# Patient Record
Sex: MALE | Race: WHITE | NOT HISPANIC OR LATINO | Employment: FULL TIME | URBAN - METROPOLITAN AREA
[De-identification: names, ages, dates, MRNs, and addresses within clinical notes are randomized per-mention and may not be internally consistent; named-entity substitution may affect disease eponyms.]

---

## 2017-01-18 RX ORDER — ATENOLOL 50 MG/1
TABLET ORAL
Qty: 30 TABLET | Refills: 0 | Status: SHIPPED | OUTPATIENT
Start: 2017-01-18 | End: 2017-02-17 | Stop reason: SDUPTHER

## 2017-01-28 ENCOUNTER — TELEPHONE (OUTPATIENT)
Dept: ELECTROPHYSIOLOGY | Facility: CLINIC | Age: 67
End: 2017-01-28

## 2017-01-28 NOTE — TELEPHONE ENCOUNTER
Left voicemail notifying patient of appt change.  New appt is 3/1/17 at 10am. Mailed out appointment reminder also

## 2017-02-06 ENCOUNTER — TELEPHONE (OUTPATIENT)
Dept: ELECTROPHYSIOLOGY | Facility: CLINIC | Age: 67
End: 2017-02-06

## 2017-02-06 NOTE — TELEPHONE ENCOUNTER
----- Message from Mariam De Jesus sent at 2/6/2017  9:11 AM CST -----  Contact: pt called  Pt called, requesting to be seen sooner and will like a contact call today.Ph [phone].Thank you

## 2017-02-17 RX ORDER — ATENOLOL 50 MG/1
TABLET ORAL
Qty: 30 TABLET | Refills: 0 | Status: SHIPPED | OUTPATIENT
Start: 2017-02-17 | End: 2017-03-22 | Stop reason: SDUPTHER

## 2017-02-17 RX ORDER — WARFARIN 2 MG/1
TABLET ORAL
Qty: 30 TABLET | Refills: 0 | Status: SHIPPED | OUTPATIENT
Start: 2017-02-17 | End: 2017-03-22 | Stop reason: SDUPTHER

## 2017-02-24 ENCOUNTER — HOSPITAL ENCOUNTER (OUTPATIENT)
Dept: CARDIOLOGY | Facility: CLINIC | Age: 67
Discharge: HOME | End: 2017-02-24
Payer: COMMERCIAL

## 2017-02-24 ENCOUNTER — ANTI-COAG VISIT (OUTPATIENT)
Dept: CARDIOLOGY | Facility: CLINIC | Age: 67
End: 2017-02-24
Payer: COMMERCIAL

## 2017-02-24 DIAGNOSIS — I48.91 ATRIAL FIBRILLATION: ICD-10-CM

## 2017-02-24 DIAGNOSIS — I48.91 ATRIAL FIBRILLATION, UNSPECIFIED TYPE: ICD-10-CM

## 2017-02-24 DIAGNOSIS — Z79.01 LONG TERM (CURRENT) USE OF ANTICOAGULANTS: Primary | ICD-10-CM

## 2017-02-24 LAB
AORTIC VALVE REGURGITATION: ABNORMAL
AORTIC VALVE STENOSIS: ABNORMAL
DIASTOLIC DYSFUNCTION: NO
ESTIMATED PA SYSTOLIC PRESSURE: 31.04
INR PPP: 2.4 (ref 2–3)
MITRAL VALVE MOBILITY: NORMAL
MITRAL VALVE REGURGITATION: ABNORMAL
RETIRED EF AND QEF - SEE NOTES: 60 (ref 55–65)
TRICUSPID VALVE REGURGITATION: ABNORMAL

## 2017-02-24 PROCEDURE — 85610 PROTHROMBIN TIME: CPT | Mod: QW,S$GLB,,

## 2017-02-24 PROCEDURE — 93306 TTE W/DOPPLER COMPLETE: CPT | Mod: S$GLB,,, | Performed by: INTERNAL MEDICINE

## 2017-02-24 PROCEDURE — 99211 OFF/OP EST MAY X REQ PHY/QHP: CPT | Mod: 25,S$GLB,,

## 2017-02-24 NOTE — PROGRESS NOTES
INR very good and stable. Pt denies changes in meds, health, or diet. He reports occasional bruising relative to activity and coumadin. Bruising assessed and nothing unusual. Maintain current dose and repeat INR 4/19 while here with other appts. Pt advised to call with changes in the meantime.

## 2017-02-27 ENCOUNTER — TELEPHONE (OUTPATIENT)
Dept: ELECTROPHYSIOLOGY | Facility: CLINIC | Age: 67
End: 2017-02-27

## 2017-02-27 NOTE — TELEPHONE ENCOUNTER
Called pt back with normal echo results, no answer. Left voicemail for pt to call back to clinic to discuss.

## 2017-03-22 DIAGNOSIS — I48.91 ATRIAL FIBRILLATION, UNSPECIFIED TYPE: Primary | ICD-10-CM

## 2017-03-22 RX ORDER — WARFARIN 2 MG/1
2 TABLET ORAL DAILY
Qty: 30 TABLET | Refills: 0 | Status: SHIPPED | OUTPATIENT
Start: 2017-03-22 | End: 2017-06-06 | Stop reason: SDUPTHER

## 2017-03-22 RX ORDER — ATENOLOL 50 MG/1
50 TABLET ORAL DAILY
Qty: 30 TABLET | Refills: 11 | Status: SHIPPED | OUTPATIENT
Start: 2017-03-22 | End: 2017-07-17 | Stop reason: SDUPTHER

## 2017-03-22 RX ORDER — DILTIAZEM HYDROCHLORIDE 240 MG/1
240 CAPSULE, COATED, EXTENDED RELEASE ORAL DAILY
Qty: 90 CAPSULE | Refills: 3 | Status: SHIPPED | OUTPATIENT
Start: 2017-03-22 | End: 2018-03-01 | Stop reason: SDUPTHER

## 2017-04-10 DIAGNOSIS — I48.91 ATRIAL FIBRILLATION, UNSPECIFIED TYPE: Primary | ICD-10-CM

## 2017-04-19 ENCOUNTER — HOSPITAL ENCOUNTER (OUTPATIENT)
Dept: CARDIOLOGY | Facility: CLINIC | Age: 67
Discharge: HOME | End: 2017-04-19
Payer: COMMERCIAL

## 2017-04-19 ENCOUNTER — OFFICE VISIT (OUTPATIENT)
Dept: ELECTROPHYSIOLOGY | Facility: CLINIC | Age: 67
End: 2017-04-19
Payer: COMMERCIAL

## 2017-04-19 ENCOUNTER — ANTI-COAG VISIT (OUTPATIENT)
Dept: CARDIOLOGY | Facility: CLINIC | Age: 67
End: 2017-04-19
Payer: COMMERCIAL

## 2017-04-19 VITALS
WEIGHT: 209.19 LBS | SYSTOLIC BLOOD PRESSURE: 108 MMHG | HEIGHT: 72 IN | BODY MASS INDEX: 28.33 KG/M2 | HEART RATE: 74 BPM | DIASTOLIC BLOOD PRESSURE: 80 MMHG

## 2017-04-19 DIAGNOSIS — I48.91 ATRIAL FIBRILLATION, UNSPECIFIED TYPE: ICD-10-CM

## 2017-04-19 DIAGNOSIS — I48.21 PERMANENT ATRIAL FIBRILLATION: Primary | ICD-10-CM

## 2017-04-19 DIAGNOSIS — I77.89 AORTIC ROOT ENLARGEMENT: ICD-10-CM

## 2017-04-19 DIAGNOSIS — Q23.1 BICUSPID AORTIC VALVE: ICD-10-CM

## 2017-04-19 DIAGNOSIS — Z79.01 LONG TERM (CURRENT) USE OF ANTICOAGULANTS: Primary | ICD-10-CM

## 2017-04-19 LAB
CTP QC/QA: YES
INR PPP: 2.8 (ref 2–3)
PROTHROMBIN TIME, POC: NORMAL (ref 2–3)

## 2017-04-19 PROCEDURE — 99213 OFFICE O/P EST LOW 20 MIN: CPT | Mod: S$GLB,,, | Performed by: INTERNAL MEDICINE

## 2017-04-19 PROCEDURE — 1160F RVW MEDS BY RX/DR IN RCRD: CPT | Mod: S$GLB,,, | Performed by: INTERNAL MEDICINE

## 2017-04-19 PROCEDURE — 99999 PR PBB SHADOW E&M-EST. PATIENT-LVL III: CPT | Mod: PBBFAC,,, | Performed by: INTERNAL MEDICINE

## 2017-04-19 PROCEDURE — 85610 PROTHROMBIN TIME: CPT | Mod: QW,S$GLB,, | Performed by: PHARMACIST

## 2017-04-19 PROCEDURE — 93000 ELECTROCARDIOGRAM COMPLETE: CPT | Mod: S$GLB,,, | Performed by: INTERNAL MEDICINE

## 2017-04-19 RX ORDER — SILDENAFIL CITRATE 100 MG/1
TABLET, FILM COATED ORAL
Refills: 7 | COMMUNITY
Start: 2017-01-11 | End: 2020-11-04

## 2017-04-19 NOTE — PROGRESS NOTES
INR is therapeutic today, higher end of range but planning to have greens today. Patient typically usually very stable one this dose. Patient denies changes to diet, medications, or health that would affect INR.  Patient will continue weekly warfarin regimen at this time. Patient advised to contact clinic with any changes, questions, or concerns.

## 2017-04-19 NOTE — MR AVS SNAPSHOT
[facility]  [address]  [facility] [address]  Phone: [phone]  Fax: [phone]                  Navjot Fine   2017 8:40 AM   Office Visit    Description:  Male : 1950   Provider:  Antonio Sosa MD   Department:  [facility]           Reason for Visit     Atrial Fibrillation           Diagnoses this Visit        Comments    Permanent atrial fibrillation    -  Primary     Bicuspid aortic valve         Aortic root enlargement                To Do List           Future Appointments        Provider Department Dept Phone    2017 3:30 PM Dennys Warren, PharmD [facility] [phone]      Goals (5 Years of Data)     None      Follow-Up and Disposition     Return in about 1 year (around 2018).    Follow-up and Disposition History      [facility]     [facility] Care [facility] -  Assistance  Unless otherwise directed by your provider, please contact [facility], our nurse care line that is available for  assistance.     Registered nurses in the [facility] Center provide: appointment scheduling, clinical advisement, health education, and other advisory services.  Call: [phone] (toll free)               Medications           Message regarding Medications     Verify the changes and/or additions to your medication regime listed below are the same as discussed with your clinician today.  If any of these changes or additions are incorrect, please notify your healthcare provider.        STOP taking these medications     STENDRA 200 mg Tab daily as needed.            Verify that the below list of medications is an accurate representation of the medications you are currently taking.  If none reported, the list may be blank. If incorrect, please contact your healthcare provider. Carry this list with you in case of emergency.           Current Medications     atenolol (TENORMIN) 50 MG tablet Take 1 tablet (50  mg total) by mouth once daily.    diltiaZEM (CARDIZEM CD) 240 MG 24 hr capsule Take 1 capsule (240 mg total) by mouth once daily.    VIAGRA 100 mg tablet TK 1 T PO PRN    warfarin (COUMADIN) 2 MG tablet Take 1 tablet (2 mg total) by mouth Daily.    warfarin (COUMADIN) 5 MG tablet Take 1 tablet (5 mg total) by mouth Daily.           Clinical Reference Information           Your Vitals Were     BP Pulse Height Weight BMI    108/80 74 6' (1.829 m) 94.9 kg (209 lb 3.5 oz) 28.37 kg/m2      Blood Pressure          Most Recent Value    BP  108/80      Allergies as of 4/19/2017     No Known Allergies      Immunizations Administered on Date of Encounter - 4/19/2017     None      Orders Placed During Today's Visit      Normal Orders This Visit    Ambulatory consult to Cardiology       [facility] Sign-Up     Activating your [facility] account is as easy as 1-2-3!     1) Visit [email or web address], select Sign Up Now, enter this activation code and your date of birth, then select Next.  [email or web address]  Expires: 6/3/2017  8:41 AM      2) Create a username and password to use when you visit [facility] in the future and select a security question in case you lose your password and select Next.    3) Enter your e-mail address and click Sign Up!    Additional Information  If you have questions, please e-mail [email or web address] or call [phone] to talk to our [facility] staff. Remember, [facility] is NOT to be used for urgent needs. For medical emergencies, dial 911.         Language Assistance Services     ATTENTION: Language assistance services are available, free of charge. Please call [phone].      ATENCIÓN: Si habla español, tiene a cardoso disposición servicios gratuitos de asistencia lingüística. Llame al [phone].     LES Ý: N?u b?n nói Ti?ng Vi?t, có các d?ch v? h? tr? ngôn ng? mi?n phí dành cho b?n. G?i s? [phone].         [facility] complies with applicable Federal civil rights laws and does not  discriminate on the basis of race, color, national origin, age, disability, or sex.

## 2017-04-19 NOTE — PROGRESS NOTES
Subjective:    Patient ID:  Navjot Fine is a 67 y.o. male who presents for follow-up of Atrial Fibrillation      HPI 66 yo male with h/o atrial fibrillation, bicuspid aortic valve, ascending aortic root enlargement.  He remains asymptomatic, happy with his energy level. Continues to exercise without problems.  Has seen Dr. Fritz in the past regarding Bicuspid aortic valve and aortic root enlargement.  Feels well.  Denies palpitations, syncope, happy with his energy level.  Exercising without problems.  Has a Fitbit.  Targets >20,000 steps a day.  Has missed target 6 times total.  Notes hair loss on legs and under arms.  Echo 2/25/17 EF 60% Aortic root 4.0 cm.    Review of Systems   Constitution: Negative. Negative for weakness and malaise/fatigue.   Cardiovascular: Negative for chest pain, dyspnea on exertion, irregular heartbeat, leg swelling, near-syncope, orthopnea, palpitations, paroxysmal nocturnal dyspnea and syncope.   Respiratory: Negative for cough and shortness of breath.    Neurological: Negative for dizziness and light-headedness.   All other systems reviewed and are negative.       Objective:    Physical Exam   Constitutional: He is oriented to person, place, and time. He appears well-developed and well-nourished.   Eyes: Conjunctivae are normal. No scleral icterus.   Neck: No JVD present. No tracheal deviation present.   Cardiovascular: Normal rate and normal heart sounds.  An irregularly irregular rhythm present. PMI is not displaced.    Pulmonary/Chest: Effort normal and breath sounds normal. No respiratory distress.   Abdominal: Soft. There is no hepatosplenomegaly. There is no tenderness.   Musculoskeletal: He exhibits no edema (lower extremity) or tenderness.   Neurological: He is alert and oriented to person, place, and time.   Skin: Skin is warm and dry. No rash noted.   Psychiatric: He has a normal mood and affect. His behavior is normal.         Assessment:       1. Permanent atrial  fibrillation    2. Bicuspid aortic valve    3. Aortic root enlargement         Plan:       Doing great.  Exercising without problems.  We have discussed DOAC's >> prefers to stay with coumadin.    Re-establish care with Cardiology regarding Bicuspid aortic valve and aortic root enlargement.

## 2017-06-06 RX ORDER — WARFARIN 2 MG/1
2 TABLET ORAL DAILY
Qty: 32 TABLET | Refills: 4
Start: 2017-06-06 | End: 2017-12-13 | Stop reason: SDUPTHER

## 2017-06-06 NOTE — ADDENDUM NOTE
Encounter addended by: Lizbeth Hines PharmD on: 6/6/2017 12:18 PM<BR>    Actions taken:  activity accessed

## 2017-07-10 ENCOUNTER — TELEPHONE (OUTPATIENT)
Dept: CARDIOLOGY | Facility: CLINIC | Age: 67
End: 2017-07-10

## 2017-07-10 DIAGNOSIS — I48.91 ATRIAL FIBRILLATION, UNSPECIFIED TYPE: Primary | ICD-10-CM

## 2017-07-10 DIAGNOSIS — I48.91 ATRIAL FIBRILLATION: ICD-10-CM

## 2017-07-11 ENCOUNTER — ANTI-COAG VISIT (OUTPATIENT)
Dept: CARDIOLOGY | Facility: CLINIC | Age: 67
End: 2017-07-11
Payer: COMMERCIAL

## 2017-07-11 ENCOUNTER — HOSPITAL ENCOUNTER (OUTPATIENT)
Dept: CARDIOLOGY | Facility: CLINIC | Age: 67
Discharge: HOME | End: 2017-07-11
Payer: COMMERCIAL

## 2017-07-11 ENCOUNTER — OFFICE VISIT (OUTPATIENT)
Dept: CARDIOLOGY | Facility: CLINIC | Age: 67
End: 2017-07-11
Payer: COMMERCIAL

## 2017-07-11 VITALS
HEART RATE: 60 BPM | BODY MASS INDEX: 27.11 KG/M2 | SYSTOLIC BLOOD PRESSURE: 115 MMHG | WEIGHT: 200.19 LBS | DIASTOLIC BLOOD PRESSURE: 70 MMHG | HEIGHT: 72 IN

## 2017-07-11 DIAGNOSIS — Z79.01 LONG TERM (CURRENT) USE OF ANTICOAGULANTS: Primary | ICD-10-CM

## 2017-07-11 DIAGNOSIS — I48.91 ATRIAL FIBRILLATION, UNSPECIFIED TYPE: ICD-10-CM

## 2017-07-11 DIAGNOSIS — I48.91 ATRIAL FIBRILLATION: ICD-10-CM

## 2017-07-11 DIAGNOSIS — I48.20 CHRONIC ATRIAL FIBRILLATION: ICD-10-CM

## 2017-07-11 DIAGNOSIS — I77.89 AORTIC ROOT ENLARGEMENT: ICD-10-CM

## 2017-07-11 LAB — INR PPP: 1.8 (ref 2–3)

## 2017-07-11 PROCEDURE — 93000 ELECTROCARDIOGRAM COMPLETE: CPT | Mod: S$GLB,,, | Performed by: INTERNAL MEDICINE

## 2017-07-11 PROCEDURE — 99214 OFFICE O/P EST MOD 30 MIN: CPT | Mod: S$GLB,,, | Performed by: INTERNAL MEDICINE

## 2017-07-11 PROCEDURE — 85610 PROTHROMBIN TIME: CPT | Mod: QW,S$GLB,, | Performed by: PHARMACIST

## 2017-07-11 PROCEDURE — 99999 PR PBB SHADOW E&M-EST. PATIENT-LVL III: CPT | Mod: PBBFAC,,, | Performed by: INTERNAL MEDICINE

## 2017-07-11 NOTE — LETTER
July 11, 2017      Antonio Sosa MD  [address] [facility] [address]           [facility]  [address]  [facility] [address]  Phone: [phone]          Patient: Navjot Fine   MR Number: [id number]   YOB: 1950   Date of Visit: 7/11/2017       Dear Dr. Antonio Sosa:    Thank you for referring Navjot Fine to me for evaluation. Attached you will find relevant portions of my assessment and plan of care.    If you have questions, please do not hesitate to call me. I look forward to following Navjot Fine along with you.    Sincerely,    Alexy Fair MD    Enclosure  CC:  Kesha Greene Jr., MD    If you would like to receive this communication electronically, please contact [email or web address] or [phone] to request more information on [facility] [employer] access.    For providers and/or their staff who would like to refer a patient to [facility], please contact us through our one-stop-shop provider referral line, [facility], at [phone].    If you feel you have received this communication in error or would no longer like to receive these types of communications, please e-mail [email or web address]

## 2017-07-11 NOTE — PROGRESS NOTES
Patient denies any changes in diet, medications, or health that would effect warfarin therapy.  INR lower than usual on previously stable dose. We will boost his warfarin dose for today then re challenge his previously stable dose

## 2017-07-11 NOTE — PROGRESS NOTES
Patient ID:  Navjot Fine is a 67 y.o. male who presents for follow-up of Atrial Fibrillation and Establish Care       Navjot Fine is a 67 y.o. male who presents for follow-up of atrial fibrillation, bicuspid aortic valve associated with aortic root dilation and to Establish Care. Patient has no symptoms, denies dizziness/near-syncope/syncope, he is physically active and takes his medicines regularly.     In the family history it is noteworthy that both the paternal grandfather and uncle  suddenly in their 40s. His father  at age 75 and did not have heart disease. His brother has atrial fibrillation and his daughter developed atrial fibrillation at age 34.    I reviewed the patient's echocardiogram to evaluate the progression of the aortic root dilatation:    2012     Aortic root 3.4 cm; ascending aorta 4.3 cm    2012      Aortic root 3.5 cm at STJ    2013       Aortic root 3.4 cm at STJ; 4.0 cm at the Sinuses of Valsalva    2015       Aortic root 3.3 cm at STJ; 3.8 cm at the Sinuses of Valsalva; 4.1 cm at the proximal ascending aorta    2017       Aortic root 4.0 cm at STJ; 4.2 cm at the Sinuses of Valsalva; 4.5 cm at the proximal ascending aorta        Lab Results   Component Value Date     2015    K 4.2 2015     2015    CO2 27 2015    BUN 20 2015    CREATININE 1.1 2015     2015    AST 23 2015    ALT 12 2015    ALBUMIN 4.0 2015    PROT 6.8 2015    BILITOT 0.8 2015    WBC 6.70 2015    HGB 13.7 (L) 2015    HCT 41.3 2015    MCV 95 2015     2015    INR 1.8 (A) 2017    INR 2.7 (H) 2010         No results found for: CHOL, HDL, TRIG    No results found for: LDLCALC    Past Medical History:   Diagnosis Date    Atrial fibrillation      Hypertension Medications             atenolol (TENORMIN) 50 MG tablet Take 1 tablet (50 mg total) by  mouth once daily.    diltiaZEM (CARDIZEM CD) 240 MG 24 hr capsule Take 1 capsule (240 mg total) by mouth once daily.            Review of Systems   Cardiovascular: Negative for chest pain, irregular heartbeat, leg swelling, near-syncope, orthopnea, palpitations and paroxysmal nocturnal dyspnea.   Respiratory: Negative for shortness of breath and sleep disturbances due to breathing.    Gastrointestinal: Negative for heartburn.        Objective:/70   Pulse 60   Ht 6' (1.829 m)   Wt 90.8 kg (200 lb 2.8 oz)   BMI 27.15 kg/m²           Physical Exam   Constitutional: He is cooperative. No distress.   Neck: Normal carotid pulses and no JVD present. Carotid bruit is not present. No thyromegaly present.   Cardiovascular: Normal rate, regular rhythm, S1 normal, S2 normal, intact distal pulses and normal pulses.    Pulmonary/Chest: Breath sounds normal.   Abdominal: He exhibits no abdominal bruit and no pulsatile midline mass.   Neurological: He is alert.   Skin:   No ankle and pretibial edema.     EKG: atrial fibrillation at a ventricular rate of 83/min, non-specific STT wave changes, unchanged since 04/19/2017.      Assessment and Plan:       1. Aortic root enlargement    2. Chronic atrial fibrillation         Aortic root enlargement  The progression of the aortic root enlargement has been relatively slow (<0.5 cm/year), however this problem needs to be followed closely. I plan to obtain a 2DEcho prior to next visit in 6 months.        Atrial fibrillation  Patient is doing well on his current medical regimen. I have advised him that if dizziness or near-syncope/syncope occur he should contact me immediately.

## 2017-07-11 NOTE — ASSESSMENT & PLAN NOTE
Patient is doing well on his current medical regimen. I have advised him that if dizziness or near-syncope/syncope occur he should contact me immediately.

## 2017-07-11 NOTE — ASSESSMENT & PLAN NOTE
The progression of the aortic root enlargement has been relatively slow (<0.5 cm/year), however this problem needs to be followed closely. I plan to obtain a 2DEcho prior to next visit in 6 months.

## 2017-07-17 DIAGNOSIS — I48.91 ATRIAL FIBRILLATION, UNSPECIFIED TYPE: ICD-10-CM

## 2017-07-17 RX ORDER — ATENOLOL 50 MG/1
50 TABLET ORAL DAILY
Qty: 30 TABLET | Refills: 11 | Status: SHIPPED | OUTPATIENT
Start: 2017-07-17 | End: 2018-06-08 | Stop reason: SDUPTHER

## 2017-07-19 ENCOUNTER — TELEPHONE (OUTPATIENT)
Dept: ELECTROPHYSIOLOGY | Facility: CLINIC | Age: 67
End: 2017-07-19

## 2017-07-19 NOTE — TELEPHONE ENCOUNTER
Prescription called into [facility] @ [address] [phone] as [facility] is out of the medication and pt is leaving to go out of town. Pt verbalized understanding.    ----- Message from Mariam De Jesus sent at 7/19/2017  2:43 PM CDT -----  Contact: Pt called  Pt called, states [facility] does not have RX atenolol and he is currently leaving for a trip this Friday. He states he will like to know what medication should he use as a replacement. He states he is completely out of medication starting today.Ph [phone]. Thank you

## 2017-08-29 ENCOUNTER — ANTI-COAG VISIT (OUTPATIENT)
Dept: CARDIOLOGY | Facility: CLINIC | Age: 67
End: 2017-08-29
Payer: COMMERCIAL

## 2017-08-29 DIAGNOSIS — Z79.01 LONG TERM (CURRENT) USE OF ANTICOAGULANTS: Primary | ICD-10-CM

## 2017-08-29 LAB — INR PPP: 2.1 (ref 2–3)

## 2017-08-29 PROCEDURE — 99211 OFF/OP EST MAY X REQ PHY/QHP: CPT | Mod: 25,S$GLB,, | Performed by: PHARMACIST

## 2017-08-29 PROCEDURE — 85610 PROTHROMBIN TIME: CPT | Mod: QW,S$GLB,, | Performed by: PHARMACIST

## 2017-09-25 ENCOUNTER — TELEPHONE (OUTPATIENT)
Dept: ELECTROPHYSIOLOGY | Facility: CLINIC | Age: 67
End: 2017-09-25

## 2017-09-25 NOTE — TELEPHONE ENCOUNTER
----- Message from Magalys Santillan sent at 9/25/2017  9:44 AM CDT -----  Contact: pt  Pt needs to ask you a quick question about his  diltiaZEM (CARDIZEM CD) 240 MG 24 hr capsule rx and can be reached at [phone].     Thank you

## 2017-10-10 NOTE — PROGRESS NOTES
The pt is scheduled for a colonoscopy at [facility] on 1/22/2018.  He is approved to hold coumadin x 3 days prior and I am not recommending lovenox, due to CHADs = 0.  I have sent this recommendation to Dr. Sosa.  Clearance e-mailed to [email or web address].

## 2017-12-04 ENCOUNTER — ANTI-COAG VISIT (OUTPATIENT)
Dept: CARDIOLOGY | Facility: CLINIC | Age: 67
End: 2017-12-04
Payer: COMMERCIAL

## 2017-12-04 DIAGNOSIS — Z79.01 LONG-TERM (CURRENT) USE OF ANTICOAGULANTS: Primary | ICD-10-CM

## 2017-12-04 LAB — INR PPP: 2.8 (ref 2–3)

## 2017-12-04 PROCEDURE — 99211 OFF/OP EST MAY X REQ PHY/QHP: CPT | Mod: 25,S$GLB,,

## 2017-12-04 PROCEDURE — 85610 PROTHROMBIN TIME: CPT | Mod: QW,S$GLB,,

## 2017-12-04 NOTE — PROGRESS NOTES
INR within normal range today. Patient states that he went to the ER 10/29/17 for an injury incurred while working out. Patient states that he has bruising but no other problems. Patient denies bleeding or any other changes. Will maintain current dose and follow up in 6 weeks. Advised patient to notify us of any change sor concerns.

## 2017-12-04 NOTE — PROGRESS NOTES
Pt seen by Carolyn HINES. I have reviewed her initial findings and agree with her assessment.  Care plan made together. He will come in 1 week prior to upcoming colonoscopy for detailed instructions based on INR at that time.

## 2017-12-13 DIAGNOSIS — Z79.01 LONG TERM CURRENT USE OF ANTICOAGULANT THERAPY: ICD-10-CM

## 2017-12-13 DIAGNOSIS — I48.91 ATRIAL FIBRILLATION, UNSPECIFIED TYPE: ICD-10-CM

## 2017-12-13 RX ORDER — WARFARIN 2 MG/1
2 TABLET ORAL DAILY
Qty: 32 TABLET | Refills: 4
Start: 2017-12-13 | End: 2018-03-16 | Stop reason: SDUPTHER

## 2017-12-22 DIAGNOSIS — I48.91 ATRIAL FIBRILLATION, UNSPECIFIED TYPE: ICD-10-CM

## 2017-12-22 DIAGNOSIS — Z79.01 LONG TERM CURRENT USE OF ANTICOAGULANT THERAPY: ICD-10-CM

## 2017-12-22 RX ORDER — WARFARIN SODIUM 5 MG/1
5 TABLET ORAL DAILY
Qty: 90 TABLET | Refills: 3 | Status: SHIPPED | OUTPATIENT
Start: 2017-12-22 | End: 2018-03-05 | Stop reason: SDUPTHER

## 2018-01-17 NOTE — PROGRESS NOTES
Patient called to report that he missed his coumadin lab appt 1/15, wants to reschedule it for week after the Colonoscopy--states it's being done on Wednesday 1/24 -not 1/22 and states Dr. Sosa is having him hold the coumadin x 3 days prior--last dose will be Saturday, call back # [phone]

## 2018-01-17 NOTE — PROGRESS NOTES
I STRONGLY URGE HE HAVE INR TOMORROW, PROTOCOL IS TO GET INR WEEK PRIOR TP PROCEDURE TO ASSURE APPROPRIATE DAYS OF HOLDING

## 2018-02-23 ENCOUNTER — ANTI-COAG VISIT (OUTPATIENT)
Dept: CARDIOLOGY | Facility: CLINIC | Age: 68
End: 2018-02-23
Payer: COMMERCIAL

## 2018-02-23 DIAGNOSIS — Z79.01 LONG TERM (CURRENT) USE OF ANTICOAGULANTS: Primary | ICD-10-CM

## 2018-02-23 LAB — INR PPP: 2 (ref 2–3)

## 2018-02-23 PROCEDURE — 99211 OFF/OP EST MAY X REQ PHY/QHP: CPT | Mod: 25,S$GLB,,

## 2018-02-23 PROCEDURE — 85610 PROTHROMBIN TIME: CPT | Mod: QW,S$GLB,,

## 2018-02-23 NOTE — PROGRESS NOTES
INR within normal range today. Patient with bruises from use. He states that he just got back from a cruise and he had an increase in greens and he won't continue this change. Denies any bleeding or other changes. Patient is stable on this dose. He will continue this dose until follow-up in 7 weeks. I advised him to contact us with any changes or problems.

## 2018-03-01 DIAGNOSIS — I48.91 ATRIAL FIBRILLATION, UNSPECIFIED TYPE: ICD-10-CM

## 2018-03-01 RX ORDER — DILTIAZEM HYDROCHLORIDE 240 MG/1
240 CAPSULE, COATED, EXTENDED RELEASE ORAL DAILY
Qty: 90 CAPSULE | Refills: 3 | Status: SHIPPED | OUTPATIENT
Start: 2018-03-01 | End: 2018-07-13 | Stop reason: SDUPTHER

## 2018-03-05 DIAGNOSIS — Z79.01 LONG TERM CURRENT USE OF ANTICOAGULANT THERAPY: ICD-10-CM

## 2018-03-05 DIAGNOSIS — I48.91 ATRIAL FIBRILLATION, UNSPECIFIED TYPE: ICD-10-CM

## 2018-03-05 RX ORDER — WARFARIN SODIUM 5 MG/1
5 TABLET ORAL DAILY
Qty: 90 TABLET | Refills: 3 | Status: SHIPPED | OUTPATIENT
Start: 2018-03-05 | End: 2018-03-14 | Stop reason: SDUPTHER

## 2018-03-14 DIAGNOSIS — I48.91 ATRIAL FIBRILLATION, UNSPECIFIED TYPE: ICD-10-CM

## 2018-03-14 DIAGNOSIS — Z79.01 LONG TERM CURRENT USE OF ANTICOAGULANT THERAPY: ICD-10-CM

## 2018-03-14 RX ORDER — WARFARIN SODIUM 5 MG/1
5 TABLET ORAL DAILY
Qty: 90 TABLET | Refills: 3 | Status: SHIPPED | OUTPATIENT
Start: 2018-03-14 | End: 2019-10-03 | Stop reason: SDUPTHER

## 2018-03-16 DIAGNOSIS — Z79.01 LONG TERM CURRENT USE OF ANTICOAGULANT THERAPY: ICD-10-CM

## 2018-03-16 DIAGNOSIS — I48.91 ATRIAL FIBRILLATION, UNSPECIFIED TYPE: ICD-10-CM

## 2018-03-19 RX ORDER — WARFARIN 2 MG/1
2 TABLET ORAL DAILY
Qty: 32 TABLET | Refills: 4
Start: 2018-03-19 | End: 2019-10-03 | Stop reason: SDUPTHER

## 2018-04-25 ENCOUNTER — ANTI-COAG VISIT (OUTPATIENT)
Dept: CARDIOLOGY | Facility: CLINIC | Age: 68
End: 2018-04-25
Payer: COMMERCIAL

## 2018-04-25 DIAGNOSIS — Z79.01 LONG TERM (CURRENT) USE OF ANTICOAGULANTS: Primary | ICD-10-CM

## 2018-04-25 LAB — INR PPP: 2.5 (ref 2–3)

## 2018-04-25 PROCEDURE — 99211 OFF/OP EST MAY X REQ PHY/QHP: CPT | Mod: 25,S$GLB,, | Performed by: PHARMACIST

## 2018-04-25 PROCEDURE — 85610 PROTHROMBIN TIME: CPT | Mod: QW,S$GLB,, | Performed by: INTERNAL MEDICINE

## 2018-04-25 NOTE — PROGRESS NOTES
Pt seen by Adamaris HINES. I have reviewed her documentation and agree with her assessment and plan.

## 2018-04-25 NOTE — PROGRESS NOTES
INR within normal range today. Patient with bruise on arm from use. Denies any bleeding or changes. Patient is stable on this dose. He will continue this dose until follow-up in 7 weeks. I advised him to contact us with any changes or problems.

## 2018-06-08 DIAGNOSIS — I48.91 ATRIAL FIBRILLATION, UNSPECIFIED TYPE: ICD-10-CM

## 2018-06-08 RX ORDER — ATENOLOL 50 MG/1
50 TABLET ORAL DAILY
Qty: 30 TABLET | Refills: 11 | Status: SHIPPED | OUTPATIENT
Start: 2018-06-08 | End: 2018-06-13 | Stop reason: SDUPTHER

## 2018-06-12 NOTE — PROGRESS NOTES
Patient called to reschedule 6/13 coumadin clinic appointment due to being out of town, rescheduled it to 6/19

## 2018-06-13 DIAGNOSIS — I48.91 ATRIAL FIBRILLATION, UNSPECIFIED TYPE: ICD-10-CM

## 2018-06-13 RX ORDER — ATENOLOL 50 MG/1
50 TABLET ORAL DAILY
Qty: 30 TABLET | Refills: 11 | Status: SHIPPED | OUTPATIENT
Start: 2018-06-13 | End: 2019-06-03 | Stop reason: SDUPTHER

## 2018-06-18 DIAGNOSIS — I48.91 ATRIAL FIBRILLATION, UNSPECIFIED TYPE: Primary | ICD-10-CM

## 2018-06-21 ENCOUNTER — ANTI-COAG VISIT (OUTPATIENT)
Dept: CARDIOLOGY | Facility: CLINIC | Age: 68
End: 2018-06-21
Payer: COMMERCIAL

## 2018-06-21 DIAGNOSIS — R00.2 PALPITATIONS: Primary | ICD-10-CM

## 2018-06-21 DIAGNOSIS — Z79.01 LONG TERM (CURRENT) USE OF ANTICOAGULANTS: Primary | ICD-10-CM

## 2018-06-21 LAB — INR PPP: 3 (ref 2–3)

## 2018-06-21 PROCEDURE — 85610 PROTHROMBIN TIME: CPT | Mod: QW,S$GLB,, | Performed by: INTERNAL MEDICINE

## 2018-06-21 NOTE — PROGRESS NOTES
INR within therapeutic range today. Patient states that he may have had less vitamin K foods. Patient denies any bleeding, bruising or other changes that would affect warfarin therapy. Will maintain current dose. Patient advised to call coumadin clinic with any changes or concerns.

## 2018-06-22 DIAGNOSIS — I77.89 AORTIC ROOT ENLARGEMENT: Primary | ICD-10-CM

## 2018-07-13 ENCOUNTER — HOSPITAL ENCOUNTER (OUTPATIENT)
Dept: CARDIOLOGY | Facility: CLINIC | Age: 68
Discharge: HOME | End: 2018-07-13
Payer: COMMERCIAL

## 2018-07-13 ENCOUNTER — HOSPITAL ENCOUNTER (OUTPATIENT)
Dept: CARDIOLOGY | Facility: CLINIC | Age: 68
Discharge: HOME | End: 2018-07-13
Attending: INTERNAL MEDICINE
Payer: COMMERCIAL

## 2018-07-13 ENCOUNTER — OFFICE VISIT (OUTPATIENT)
Dept: CARDIOLOGY | Facility: CLINIC | Age: 68
End: 2018-07-13
Payer: COMMERCIAL

## 2018-07-13 VITALS
HEART RATE: 97 BPM | SYSTOLIC BLOOD PRESSURE: 116 MMHG | BODY MASS INDEX: 27 KG/M2 | WEIGHT: 199.31 LBS | HEIGHT: 72 IN | DIASTOLIC BLOOD PRESSURE: 69 MMHG

## 2018-07-13 DIAGNOSIS — R00.2 PALPITATIONS: ICD-10-CM

## 2018-07-13 DIAGNOSIS — Q23.1 BICUSPID AORTIC VALVE: Primary | ICD-10-CM

## 2018-07-13 DIAGNOSIS — I77.89 AORTIC ROOT ENLARGEMENT: ICD-10-CM

## 2018-07-13 DIAGNOSIS — I35.9 NONRHEUMATIC AORTIC VALVE DISORDER: ICD-10-CM

## 2018-07-13 DIAGNOSIS — I48.19 PERSISTENT ATRIAL FIBRILLATION: ICD-10-CM

## 2018-07-13 DIAGNOSIS — I77.9 AORTIC DISEASE: ICD-10-CM

## 2018-07-13 DIAGNOSIS — I48.91 ATRIAL FIBRILLATION, UNSPECIFIED TYPE: ICD-10-CM

## 2018-07-13 LAB
AORTIC VALVE REGURGITATION: NORMAL
AORTIC VALVE STENOSIS: NORMAL
DIASTOLIC DYSFUNCTION: NO
ESTIMATED PA SYSTOLIC PRESSURE: 32.31
MITRAL VALVE MOBILITY: NORMAL
MITRAL VALVE REGURGITATION: NORMAL
RETIRED EF AND QEF - SEE NOTES: 60 (ref 55–65)
TRICUSPID VALVE REGURGITATION: NORMAL

## 2018-07-13 PROCEDURE — 99999 PR PBB SHADOW E&M-EST. PATIENT-LVL IV: CPT | Mod: PBBFAC,,, | Performed by: INTERNAL MEDICINE

## 2018-07-13 PROCEDURE — 99214 OFFICE O/P EST MOD 30 MIN: CPT | Mod: S$GLB,,, | Performed by: INTERNAL MEDICINE

## 2018-07-13 PROCEDURE — 93306 TTE W/DOPPLER COMPLETE: CPT | Mod: S$GLB,,, | Performed by: INTERNAL MEDICINE

## 2018-07-13 PROCEDURE — 93000 ELECTROCARDIOGRAM COMPLETE: CPT | Mod: S$GLB,,, | Performed by: INTERNAL MEDICINE

## 2018-07-13 RX ORDER — DILTIAZEM HYDROCHLORIDE 240 MG/1
CAPSULE, COATED, EXTENDED RELEASE ORAL
Qty: 90 CAPSULE | Refills: 3 | Status: SHIPPED | OUTPATIENT
Start: 2018-07-13 | End: 2019-03-13 | Stop reason: SDUPTHER

## 2018-07-13 NOTE — ASSESSMENT & PLAN NOTE
Today's echocardiogram shows no progression of the aortic root dilation since February 2017:  02/25/2017       Aortic root 4.0 cm at STJ; 4.2 cm at the Sinuses of Valsalva; 4.5 cm at the proximal ascending aorta  07/13/2018        The aortic root is mildly enlarged, measuring 3.4 cm at sinotubular junction and 4.2 cm at Sinuses of Valsalva. The proximal ascending aorta is moderately enlarged, measuring 4.5 cm across.     I discussed with the patient the possibility that in the future he may need to undergo surgery for ascending aorta aneurysm. He agreed to have a preliminary meeting with [facility].    CTA of the chest   [facility] ambulatory referral

## 2018-07-13 NOTE — ASSESSMENT & PLAN NOTE
Asymptomatic. Today's ECG shows atrial fibrillation with a ventricular rate of 91/min.    Increase cardizem CD to 360 mg qd

## 2018-07-13 NOTE — ASSESSMENT & PLAN NOTE
Today's echocardiogram shows: the aortic valve is moderately sclerotic with moderately restricted leaflet mobility. The aortic valve is bi-leaflet in structure. The peak velocity obtained across the aortic valve is 1.8 m/s. The mean gradient is 8 mmHg. The calculated aortic valve area is 2.24 cm2.  There is trivial aortic regurgitation.     Pt advised that his first degree relatives should be evaluated for bicuspid aortic valve.

## 2018-07-13 NOTE — PROGRESS NOTES
"Cardiology Progress Note:    Patient ID:  Navjot Fine is a 68 y.o. male who presents for follow-up of Atrial Fibrillation, bicuspid aortic valve and dilated aortic root.    History of Present Illness: Pt has been in his usual state of health since I last saw him on 2017.     He has persistent atrial fibrillation but is asymptomatic and has no apparent limitations of his exercise tolerance because of this problem. He exercises regularly without problems    I reviewed the patient's echocardiogram to evaluate the progression of the aortic root dilatation:     2012     Aortic root 3.4 cm; ascending aorta 4.3 cm     2012      Aortic root 3.5 cm at STJ     2013       Aortic root 3.4 cm at STJ; 4.0 cm at the Sinuses of Valsalva     2015       Aortic root 3.3 cm at STJ; 3.8 cm at the Sinuses of Valsalva; 4.1 cm at the proximal ascending aorta     2017       Aortic root 4.0 cm at STJ; 4.2 cm at the Sinuses of Valsalva; 4.5 cm at the proximal ascending aorta    2018        The aortic root is mildly enlarged, measuring 3.4 cm at sinotubular junction and 4.2 cm at Sinuses of Valsalva. The proximal ascending aorta is moderately enlarged, measuring 4.5 cm across.     Relevant information from my 2017 note:  "Navjot Fine is a 67 y.o. male who presents for follow-up of atrial fibrillation, bicuspid aortic valve associated with aortic root dilation and to Establish Care. Patient has no symptoms, denies dizziness/near-syncope/syncope, he is physically active and takes his medicines regularly."    Past Medical History Includes:  Atrial fibrillation; bicuspid aortic valve; aortic root enlargement    Family History Includes:    In the family history it is noteworthy that both the paternal grandfather and uncle  suddenly in their 40s. His father  at age 75 and did not have heart disease. His brother has atrial fibrillation and his daughter developed atrial fibrillation at " age 34.    Full Medication List Includes:  Outpatient Encounter Prescriptions as of 7/13/2018   Medication Sig Dispense Refill    atenolol (TENORMIN) 50 MG tablet Take 1 tablet (50 mg total) by mouth once daily. 30 tablet 11    diltiaZEM (CARDIZEM CD) 240 MG 24 hr capsule Take 1 and 1/2 tablet (360 mg) once per day 90 capsule 3    VIAGRA 100 mg tablet TK 1 T PO PRN  7    warfarin (COUMADIN) 2 MG tablet Take 1 tablet (2 mg total) by mouth Daily. As directed by [facility] 32 tablet 4    warfarin (COUMADIN) 5 MG tablet Take 1 tablet (5 mg total) by mouth Daily. 90 tablet 3    [DISCONTINUED] diltiaZEM (CARDIZEM CD) 240 MG 24 hr capsule Take 1 capsule (240 mg total) by mouth once daily. 90 capsule 3     No facility-administered encounter medications on file as of 7/13/2018.        Review of Systems:  Review of Systems   Constitution: Negative for decreased appetite, weakness, malaise/fatigue, weight gain and weight loss.   HENT: Negative for ear discharge, hearing loss and nosebleeds.    Eyes: Negative for blurred vision, pain, photophobia and visual disturbance.   Cardiovascular: Negative for chest pain, claudication, cyanosis, dyspnea on exertion, irregular heartbeat, leg swelling, near-syncope, orthopnea, palpitations, paroxysmal nocturnal dyspnea and syncope.   Respiratory: Negative for cough, hemoptysis, shortness of breath, sleep disturbances due to breathing, snoring, sputum production and wheezing.    Endocrine: Negative for cold intolerance and polydipsia.   Hematologic/Lymphatic: Negative for adenopathy and bleeding problem. Does not bruise/bleed easily.   Skin: Negative for poor wound healing, rash and suspicious lesions.   Musculoskeletal: Negative for arthritis, back pain, falls, gout, joint pain, muscle cramps, myalgias and neck pain.   Gastrointestinal: Negative for anorexia, change in bowel habit, constipation, diarrhea, excessive appetite, heartburn, hematemesis, hematochezia, melena, nausea  and vomiting.   Genitourinary: Negative for decreased libido, dysuria, flank pain and frequency.   Neurological: Negative for difficulty with concentration, excessive daytime sleepiness, dizziness, focal weakness, light-headedness, loss of balance, seizures and vertigo.   Psychiatric/Behavioral: Negative for altered mental status, memory loss and substance abuse. The patient is not nervous/anxious.    Allergic/Immunologic: Negative for HIV exposure and hives.       Physical Exam:  /69 (BP Location: Left arm, Patient Position: Sitting, BP Method: Medium (Automatic))   Pulse 97   Ht 6' (1.829 m)   Wt 90.4 kg (199 lb 4.7 oz)   BMI 27.03 kg/m²   Physical Exam   Constitutional: He is oriented to person, place, and time. He appears well-developed and well-nourished.   HENT:   Head: Normocephalic.   Right Ear: External ear normal.   Left Ear: External ear normal.   Nose: Nose normal.   Inspection of lips, teeth and gums normal   Eyes: EOM are normal. Pupils are equal, round, and reactive to light. No scleral icterus.   Neck: Normal range of motion. Neck supple. No JVD present. No tracheal deviation present. No thyromegaly present.   Cardiovascular: Normal rate, S1 normal, S2 normal and intact distal pulses.  An irregularly irregular rhythm present. Exam reveals no gallop and no friction rub.    Murmur heard.  High-pitched blowing holosystolic murmur is present with a grade of 1/6  at the apex  Pulses:       Carotid pulses are 2+ on the right side, and 2+ on the left side.       Radial pulses are 2+ on the right side, and 2+ on the left side.        Dorsalis pedis pulses are 2+ on the right side, and 2+ on the left side.        Posterior tibial pulses are 2+ on the right side, and 2+ on the left side.   Pulmonary/Chest: Effort normal and breath sounds normal.   Abdominal: Bowel sounds are normal. He exhibits no distension. There is no hepatosplenomegaly. There is no tenderness. There is no guarding.    Musculoskeletal: Normal range of motion. He exhibits no edema or tenderness.   Lymphadenopathy:   Palpation of neck and groin lymph nodes normal   Neurological: He is alert and oriented to person, place, and time. No cranial nerve deficit. He exhibits normal muscle tone. Coordination normal.   Skin: Skin is dry.   No ankle nor pretibial edema   Psychiatric: His behavior is normal. Judgment and thought content normal.        Blood Tests:  Lab Results   Component Value Date     07/31/2015    K 4.2 07/31/2015     07/31/2015    CO2 27 07/31/2015    BUN 20 07/31/2015    CREATININE 1.1 07/31/2015     07/31/2015    AST 23 07/31/2015    ALT 12 07/31/2015    ALBUMIN 4.0 07/31/2015    PROT 6.8 07/31/2015    BILITOT 0.8 07/31/2015    WBC 6.70 07/31/2015    HGB 13.7 (L) 07/31/2015    HCT 41.3 07/31/2015    MCV 95 07/31/2015     07/31/2015    INR 3.0 06/21/2018    INR 2.7 (H) 03/29/2010       No results found for: CHOL, HDL, TRIG    No results found for: LDLCALC    Urine Tests:  No results found for: COLORU, APPEARANCEUA, PHUR, SPECGRAV, PROTEINUA, GLUCUA, KETONESU, BILIRUBINUA, OCCULTUA, NITRITE, UROBILINOGEN, LEUKOCYTESUR, PROTEINURINE, CREATRANDUR, UTPCR       ECG (11-JUL-2017)  Atrial fibrillation  Low QRS voltage in limb leads  Nonspecific ST and T wave abnormality  Abnormal ECG  When compared with ECG of 19-APR-2017 07:55,  No significant change was found          Echocardiogram (07/13/2018)    TEST DESCRIPTION   Technical Quality: This is a technically adequate study.     Aorta: The aortic root is mildly enlarged, measuring 3.4 cm at sinotubular junction and 4.2 cm at Sinuses of Valsalva. The proximal ascending aorta is moderately enlarged, measuring 4.5 cm across.     Left Atrium: The left atrial volume index is severely enlarged, measuring 71.71 cc/m2.     Left Ventricle: The left ventricle is normal in size, with an end-diastolic diameter of 5.0 cm, and an end-systolic diameter of 3.2 cm. LV  wall thickness is normal, with the septum measuring 1.0 cm and the posterior wall measuring 0.9 cm across. Relative wall thickness was normal at 0.36, and the LV mass index was 90.4 g/m2 consistent with normal left ventricular mass. There are no regional wall motion abnormalities. Left ventricular systolic function appears normal. Visually estimated ejection fraction is 60-65%. The LV Doppler derived stroke volume equals 72.0 ccs.     Diastolic indices: E/e' ratio(avg) 7. Diastolic function is normal.     Right Atrium: The right atrium is severely enlarged, measuring 7.9 cm in length and 4.9 cm in width in the apical view.     Right Ventricle: The right ventricle is normal in size. Global right ventricular systolic function appears normal. Tricuspid annular plane systolic excursion (TAPSE) is 2.8 cm. Tissue Doppler-derived tricuspid annular peak systolic velocity (S prime) is 10.3 cm/s. The estimated PA systolic pressure is 32 mmHg.     Aortic Valve:  The aortic valve is moderately sclerotic with moderately restricted leaflet mobility. The aortic valve is bi-leaflet in structure. The peak velocity obtained across the aortic valve is 1.8 m/s, which translates to a peak gradient of 13 mmHg. The mean gradient is 8 mmHg. Using a left ventricular outflow tract diameter of 2.8 cm, a left ventricular outflow tract velocity time integral of 12 cm, and a peak instantaneous transvalvular velocity time integral of 32 cm, the calculated aortic valve area is 2.24 cm2 (AVAi is 1.02 cm2/m2), consistent with trivial aortic stenosis. Additionally, there is trivial aortic regurgitation.     Mitral Valve:  The mitral valve is mildly sclerotic with normal leaflet mobility. There is mild mitral regurgitation.     Tricuspid Valve:  There is mild tricuspid regurgitation.     Pulmonary Valve:  There is trivial pulmonic regurgitation.     IVC: IVC is enlarged and collapses < 50% with a sniff, suggesting high right atrial pressure of 15  mmHg.     Intracavitary: There is no evidence of pericardial effusion, intracavity mass, thrombi, or vegetation.     CONCLUSIONS     1 - Normal left ventricular systolic function (EF 60-65%).     2 - Normal right ventricular systolic function .     3 - Biatrial enlargement.     4 - Moderately enlarged ascending aorta (45mm in diameter).     5 - Bi-leaflet aortic valve with aortic sclerosis.     6 - Mild mitral regurgitation.     7 - Mild tricuspid regurgitation.     8 - The estimated PA systolic pressure is 32 mmHg.     9 - Increased central venous pressure.     This document has been electronically    SIGNED BY: Don Tsang MD         Echocardiogram (02/24/2017)    TEST DESCRIPTION   Technical Quality: This is a technically adequate study.     General: The patient was in an irregularly irregular rhythm throughout the study.     Aorta: The aortic root is enlarged, measuring 4.0 cm at sinotubular junction and 4.2 cm at Sinuses of Valsalva. The proximal ascending aorta is moderately enlarged, measuring 4.5 cm across.     Left Atrium: The left atrial volume index is severely enlarged, measuring 57.53 cc/m2.     Left Ventricle: The left ventricle is normal in size, with an end-diastolic diameter of 4.7 cm, and an end-systolic diameter of 3.4 cm. LV wall thickness is normal, with the septum measuring 1.0 cm and the posterior wall measuring 0.8 cm across. Relative wall thickness was normal at 0.34, and the LV mass index was 74.9 g/m2 consistent with normal left ventricular mass. Global left ventricular systolic function appears normal. Visually estimated ejection fraction is 60-65%. The LV Doppler derived stroke   volume equals 75.0 ccs.   No distinct A waves were identified on assessment of mitral inflow. The E/e'(lat) is 7, consistent with normal diastolic function.     Right Atrium: The right atrium is normal in size, measuring 6.9 cm in length and 4.3 cm in width in the apical view.     Right Ventricle: The right  ventricle is normal in size measuring 3.9 cm at the base in the apical right ventricle-focused view. Global right ventricular systolic function appears normal. Tricuspid annular plane systolic excursion (TAPSE) is 2.3 cm.   Tissue Doppler-derived tricuspid annular peak systolic velocity (S prime) is 10.2 cm/s. The estimated PA systolic pressure is 31 mmHg.     Aortic Valve:  The aortic valve is moderately sclerotic with mildly restricted leaflet mobility. The aortic valve is bi-leaflet in structure. There is mild aortic stenosis. Additionally, there is trivial aortic regurgitation. Partial fusion of left and right coronary cusps.     Mitral Valve:  The mitral valve is normal in structure with normal leaflet mobility. There is mild to moderate mitral regurgitation.     Tricuspid Valve:  The tricuspid valve is normal in structure with normal leaflet mobility. There is mild tricuspid regurgitation.     Pulmonary Valve:  The pulmonic valve is normal in structure with normal leaflet mobility. There is trivial pulmonic regurgitation.     IVC: IVC is enlarged but collapses > 50% with a sniff, suggesting intermediate right atrial pressure of 8 mmHg.     Intracavitary: There is no evidence of pericardial effusion, intracavity mass, thrombi, or vegetation.     CONCLUSIONS     1 - Normal left ventricular systolic function (EF 60-65%).     2 - Normal right ventricular systolic function .     3 - Severe left atrial enlargement.     4 - Moderately enlarged ascending aorta.     5 - Bi-leaflet aortic valve.     6 - Mild aortic stenosis.     7 - Mild to moderate mitral regurgitation.     8 - Mild tricuspid regurgitation.     9 - The estimated PA systolic pressure is 31 mmHg.     This document has been electronically    SIGNED BY: Don Tsang MD         I have reviewed the following:     Details / Date    []   Labs     []   Imaging     []   Cardiology Procedures     []   Other      Assessment and Plan:     1. Bicuspid aortic valve     2. Aortic root enlargement    3. Persistent atrial fibrillation    4. Aortic disease    5. Atrial fibrillation, unspecified type         Bicuspid aortic valve  Today's echocardiogram shows: the aortic valve is moderately sclerotic with moderately restricted leaflet mobility. The aortic valve is bi-leaflet in structure. The peak velocity obtained across the aortic valve is 1.8 m/s. The mean gradient is 8 mmHg. The calculated aortic valve area is 2.24 cm2.  There is trivial aortic regurgitation.     Pt advised that his first degree relatives should be evaluated for bicuspid aortic valve.    Aortic root enlargement  Today's echocardiogram shows no progression of the aortic root dilation since February 2017:  02/25/2017       Aortic root 4.0 cm at STJ; 4.2 cm at the Sinuses of Valsalva; 4.5 cm at the proximal ascending aorta  07/13/2018        The aortic root is mildly enlarged, measuring 3.4 cm at sinotubular junction and 4.2 cm at Sinuses of Valsalva. The proximal ascending aorta is moderately enlarged, measuring 4.5 cm across.     I discussed with the patient the possibility that in the future he may need to undergo surgery for ascending aorta aneurysm. He agreed to have a preliminary meeting with [facility].    CTA of the chest   CTS ambulatory referral    Atrial fibrillation  Asymptomatic. Today's ECG shows atrial fibrillation with a ventricular rate of 91/min.    Increase cardizem CD to 360 mg qd      Follow-up in about 6 months (around 1/13/2019) for Dr Corral.      Alexy Fair MD

## 2018-07-16 ENCOUNTER — TELEPHONE (OUTPATIENT)
Dept: ELECTROPHYSIOLOGY | Facility: CLINIC | Age: 68
End: 2018-07-16

## 2018-07-16 NOTE — TELEPHONE ENCOUNTER
Returned patient call. Appointment scheduled for Ekg and a  follow-up appointment.  Please keep scheduled appointments.

## 2018-07-16 NOTE — PROGRESS NOTES
Subjective:    Patient ID:  Navjot Fine is a 68 y.o. male who presents for follow-up of Atrial Fibrillation      HPI 67 yo male with h/o atrial fibrillation, bicuspid aortic valve, ascending aortic root enlargement.  He remains asymptomatic, happy with his energy level. Continues to exercise without problems.    Continues to feel well.  Remains very active (>63754 steps per day).    Notes hair loss on legs and under arms.  Echo 7/13/18 EF 60-65% Ascending aorta 4.5 cm.  Remains on coumadin.        Review of Systems   Constitution: Negative. Negative for weakness and malaise/fatigue.   Cardiovascular: Negative for chest pain, dyspnea on exertion, irregular heartbeat, leg swelling, near-syncope, orthopnea, palpitations, paroxysmal nocturnal dyspnea and syncope.   Respiratory: Negative for cough and shortness of breath.    Neurological: Negative for dizziness and light-headedness.   All other systems reviewed and are negative.       Objective:    Physical Exam   Constitutional: He is oriented to person, place, and time. He appears well-developed and well-nourished.   Eyes: Conjunctivae are normal. No scleral icterus.   Neck: No JVD present. No tracheal deviation present.   Cardiovascular: Normal rate and normal heart sounds.  An irregularly irregular rhythm present. PMI is not displaced.    Pulmonary/Chest: Effort normal and breath sounds normal. No respiratory distress.   Abdominal: Soft. There is no hepatosplenomegaly. There is no tenderness.   Musculoskeletal: He exhibits no edema (lower extremity) or tenderness.   Neurological: He is alert and oriented to person, place, and time.   Skin: Skin is warm and dry. No rash noted.   Psychiatric: He has a normal mood and affect. His behavior is normal.         Assessment:       1. Chronic atrial fibrillation    2. Aortic root enlargement    3. Bicuspid aortic valve         Plan:           Chronic atrial fibrillation.  Remains rate controlled and asymptomatic, with  normal EF.  He is set up to see Dr. Tadeo regarding his Aortic root.  If surgery is considered, I would endorse DINORA ligation, but defer Zuniga-Maze (has longstanding af and is asymptomatic).  F/u with me in one year.

## 2018-07-16 NOTE — TELEPHONE ENCOUNTER
----- Message from Kayli Short sent at 7/16/2018 10:33 AM CDT -----  Contact: pt called   Pt had a EKG on 7/13/18 and was wondering if he still has to completed another one on 7/18/18. Please call pt@ [phone]. Thank you.

## 2018-07-18 ENCOUNTER — OFFICE VISIT (OUTPATIENT)
Dept: ELECTROPHYSIOLOGY | Facility: CLINIC | Age: 68
End: 2018-07-18
Payer: COMMERCIAL

## 2018-07-18 ENCOUNTER — HOSPITAL ENCOUNTER (OUTPATIENT)
Dept: CARDIOLOGY | Facility: CLINIC | Age: 68
Discharge: HOME | End: 2018-07-18
Payer: COMMERCIAL

## 2018-07-18 VITALS
HEIGHT: 72 IN | WEIGHT: 197.56 LBS | BODY MASS INDEX: 26.76 KG/M2 | DIASTOLIC BLOOD PRESSURE: 86 MMHG | SYSTOLIC BLOOD PRESSURE: 128 MMHG | HEART RATE: 85 BPM

## 2018-07-18 DIAGNOSIS — I48.20 CHRONIC ATRIAL FIBRILLATION: Primary | ICD-10-CM

## 2018-07-18 DIAGNOSIS — Q23.1 BICUSPID AORTIC VALVE: ICD-10-CM

## 2018-07-18 DIAGNOSIS — I77.89 AORTIC ROOT ENLARGEMENT: ICD-10-CM

## 2018-07-18 DIAGNOSIS — I48.91 ATRIAL FIBRILLATION, UNSPECIFIED TYPE: ICD-10-CM

## 2018-07-18 PROCEDURE — 93000 ELECTROCARDIOGRAM COMPLETE: CPT | Mod: S$GLB,,, | Performed by: INTERNAL MEDICINE

## 2018-07-18 PROCEDURE — 99214 OFFICE O/P EST MOD 30 MIN: CPT | Mod: S$GLB,,, | Performed by: INTERNAL MEDICINE

## 2018-07-18 PROCEDURE — 99999 PR PBB SHADOW E&M-EST. PATIENT-LVL III: CPT | Mod: PBBFAC,,, | Performed by: INTERNAL MEDICINE

## 2018-07-20 ENCOUNTER — HOSPITAL ENCOUNTER (OUTPATIENT)
Dept: RADIOLOGY | Facility: HOSPITAL | Age: 68
Discharge: HOME | End: 2018-07-20
Attending: INTERNAL MEDICINE
Payer: COMMERCIAL

## 2018-07-20 DIAGNOSIS — I77.9 AORTIC DISEASE: ICD-10-CM

## 2018-07-20 LAB
CREAT SERPL-MCNC: 0.8 MG/DL (ref 0.5–1.4)
SAMPLE: NORMAL

## 2018-07-20 PROCEDURE — 25500020 PHARM REV CODE 255: Performed by: INTERNAL MEDICINE

## 2018-07-20 PROCEDURE — 71275 CT ANGIOGRAPHY CHEST: CPT | Mod: TC

## 2018-07-20 PROCEDURE — 71275 CT ANGIOGRAPHY CHEST: CPT | Mod: 26,,, | Performed by: RADIOLOGY

## 2018-07-20 RX ADMIN — IOHEXOL 100 ML: 350 INJECTION, SOLUTION INTRAVENOUS at 04:07

## 2018-07-25 ENCOUNTER — TELEPHONE (OUTPATIENT)
Dept: CARDIOLOGY | Facility: CLINIC | Age: 68
End: 2018-07-25

## 2018-07-25 NOTE — TELEPHONE ENCOUNTER
Called patient to give him results of recent CT of the chest but no answer. Left detailed message on his voicemail and asked him to call us.    MD Patricia Marshall MA             Please inform pt that the CT of the chest shows that the dilation of the thoracic aorta is stable since May 2012.     shelter

## 2018-08-02 ENCOUNTER — OFFICE VISIT (OUTPATIENT)
Dept: CARDIOTHORACIC SURGERY | Facility: CLINIC | Age: 68
End: 2018-08-02
Payer: COMMERCIAL

## 2018-08-02 VITALS
HEART RATE: 86 BPM | WEIGHT: 199.44 LBS | DIASTOLIC BLOOD PRESSURE: 76 MMHG | OXYGEN SATURATION: 98 % | TEMPERATURE: 98 F | HEIGHT: 72 IN | SYSTOLIC BLOOD PRESSURE: 112 MMHG | BODY MASS INDEX: 27.01 KG/M2

## 2018-08-02 DIAGNOSIS — I77.89 AORTIC ROOT ENLARGEMENT: ICD-10-CM

## 2018-08-02 DIAGNOSIS — Q23.1 BICUSPID AORTIC VALVE: Primary | ICD-10-CM

## 2018-08-02 PROCEDURE — 99205 OFFICE O/P NEW HI 60 MIN: CPT | Mod: S$GLB,,, | Performed by: THORACIC SURGERY (CARDIOTHORACIC VASCULAR SURGERY)

## 2018-08-02 PROCEDURE — 99999 PR PBB SHADOW E&M-EST. PATIENT-LVL III: CPT | Mod: PBBFAC,,, | Performed by: THORACIC SURGERY (CARDIOTHORACIC VASCULAR SURGERY)

## 2018-08-02 RX ORDER — VITAMIN B COMPLEX
1 CAPSULE ORAL DAILY
COMMUNITY

## 2018-08-02 NOTE — PROGRESS NOTES
History & Physical    SUBJECTIVE:     History of Present Illness:  Patient is a 68 y.o. male presents with an enlarged aortic root and a bicuspid aortic valve.  He has atrial fib and see's Dr. Sosa.  He is on coumadin.  He is active and continues to exercise.  He denies shortness of breath, PND, orthopnea, increased abdominal girth, or pedal edema.  He is here to discussed his enlarged root and his bicuspid AV.    Chief Complaint   Patient presents with    Consult       Review of patient's allergies indicates:   Allergen Reactions    No known allergies        Current Outpatient Prescriptions   Medication Sig Dispense Refill    atenolol (TENORMIN) 50 MG tablet Take 1 tablet (50 mg total) by mouth once daily. 30 tablet 11    diltiaZEM (CARDIZEM CD) 240 MG 24 hr capsule Take 1 and 1/2 tablet (360 mg) once per day 90 capsule 3    VIAGRA 100 mg tablet TK 1 T PO PRN  7    warfarin (COUMADIN) 2 MG tablet Take 1 tablet (2 mg total) by mouth Daily. As directed by [facility] 32 tablet 4    warfarin (COUMADIN) 5 MG tablet Take 1 tablet (5 mg total) by mouth Daily. 90 tablet 3     No current facility-administered medications for this visit.        Past Medical History:   Diagnosis Date    Atrial fibrillation      History reviewed. No pertinent surgical history.  History reviewed. No pertinent family history.  Social History   Substance Use Topics    Smoking status: Never Smoker    Smokeless tobacco: Never Used    Alcohol use Yes      Comment: [facility].        Review of Systems     Review of Systems   Constitutional: Negative for fever and malaise/fatigue.   HENT: Negative for congestion and sore throat.    Eyes: Negative for blurred vision, double vision and discharge.   Respiratory: Negative for cough, shortness of breath and wheezing.    Cardiovascular: Negative for chest pain, palpitations, claudication, leg swelling and PND.   Gastrointestinal: Negative for abdominal pain, constipation, diarrhea, nausea and  vomiting.   Genitourinary: Negative for dysuria, frequency and urgency.   Musculoskeletal: Negative for back pain and myalgias.   Skin: Negative for itching and rash.   Neurological: Negative for dizziness, speech change, seizures, weakness and headaches.   Endo/Heme/Allergies: Does not bruise/bleed easily.   Psychiatric/Behavioral: Negative for depression. The patient is not nervous/anxious.        OBJECTIVE:     Vital Signs (Most Recent)    Reviewed nurses notes      Physical Exam     Physical Exam   Constitutional: Patient appears well-developed and well-nourished.   HENT:   Head: Normocephalic and atraumatic.   Eyes: Pupils are equal, round, and reactive to light.   Neck: Normal range of motion. Neck supple.   Cardiovascular: Normal rate, regular rhythm and normal heart sounds.    Pulmonary/Chest: Effort normal and breath sounds normal.   Abdominal: Soft. Bowel sounds are normal.   Musculoskeletal: Normal range of motion.   Neurological: Alert and oriented to person, place, and time.   Skin: Skin is warm and dry.   Psychiatric: Normal mood and affect. Behavior is normal.       Laboratory  reviewed    Diagnostic Results:  2D echo:  1 - Normal left ventricular systolic function (EF 60-65%).     2 - Normal right ventricular systolic function .     3 - Biatrial enlargement.     4 - Moderately enlarged ascending aorta (45mm in diameter).     5 - Bi-leaflet aortic valve with aortic sclerosis.     6 - Mild mitral regurgitation.     7 - Mild tricuspid regurgitation.     8 - The estimated PA systolic pressure is 32 mmHg.     9 - Increased central venous pressure.     CT scan of the chest:  There is a left-sided aortic arch with 3 branch vessels demonstrating minimal calcific atherosclerosis. There is aortic valve calcification in this patient with history of bi-leaflet valve.  The sinus of Valsalva measures 4.1 cm.  The mid ascending aorta measures 4.3 cm (coronal series 602, image 46), previously 4.6 cm on CT chest  dated 05/03/2012.  The mid descending aorta measures 3.2 cm, unchanged.    ASSESSMENT/PLAN:     68 year old male with enlarged aortic root (appears unchanged from last year) and a bicuspid AV without valvular dysfunction.  He presents for surgical evaluation.    PLAN:    CTS Attending Note:    I have personally taken the history and examined this patient and agree with the NP's note as stated above. Very pleasant 68-year-old gentleman with known bicuspid aortic valve and a dilated ascending aorta.  His ascending aorta is roughly 4.3 cm at the level of the right pulmonary artery.  The sinuses are roughly the same.  His degree of stenosis is minimal.  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  We discussed indications for surgery including a 5 cm ascending aorta in the absence of stenosis, and a 4.5 cm ascending aorta in the presence of a bicuspid stenotic valve.  We discussed aortic stenosis and the slow, but unfortunately relentless progression.  I will plan to see him back in 1 year with a repeat echo and CT scan.

## 2018-08-02 NOTE — LETTER
August 2, 2018      Alexy Fair MD  [address]  [facility] [address]           [facility] - Cardiovascular Surg  [address]  [facility] [address]  Phone: [phone]          Patient: Navjot Fine   MR Number: [id number]   YOB: 1950   Date of Visit: 8/2/2018       Dear Dr. Alexy Fair:    Thank you for referring Navjot Fine to me for evaluation. Attached you will find relevant portions of my assessment and plan of care.    If you have questions, please do not hesitate to call me. I look forward to following Navjot Fine along with you.    Sincerely,    Immanuel Tadeo MD    Enclosure  CC:  No Recipients    If you would like to receive this communication electronically, please contact [email or web address] or [phone] to request more information on [facility] access.    For providers and/or their staff who would like to refer a patient to [facility], please contact us through our one-stop-shop provider referral line, [facility], [place], at [phone].    If you feel you have received this communication in error or would no longer like to receive these types of communications, please e-mail [email or web address]

## 2018-08-23 ENCOUNTER — ANTI-COAG VISIT (OUTPATIENT)
Dept: CARDIOLOGY | Facility: CLINIC | Age: 68
End: 2018-08-23
Payer: COMMERCIAL

## 2018-08-23 DIAGNOSIS — Z79.01 LONG TERM (CURRENT) USE OF ANTICOAGULANTS: Primary | ICD-10-CM

## 2018-08-23 LAB — INR PPP: 2.8 (ref 2–3)

## 2018-08-23 PROCEDURE — 99211 OFF/OP EST MAY X REQ PHY/QHP: CPT | Mod: 25,S$GLB,, | Performed by: INTERNAL MEDICINE

## 2018-08-23 PROCEDURE — 85610 PROTHROMBIN TIME: CPT | Mod: QW,S$GLB,, | Performed by: INTERNAL MEDICINE

## 2018-08-23 NOTE — PROGRESS NOTES
INR within range today.  Will maintain and follow up in 7 weeks.  Bruising from use but denies bleeding or other issues.  Patient will call with concerns or questions.

## 2018-10-15 ENCOUNTER — TELEPHONE (OUTPATIENT)
Dept: INTERNAL MEDICINE | Facility: CLINIC | Age: 68
End: 2018-10-15

## 2018-10-15 NOTE — TELEPHONE ENCOUNTER
Called patient to schedule his office visit no answer left message to call back to schedule his Est Care appointment.

## 2018-10-16 ENCOUNTER — TELEPHONE (OUTPATIENT)
Dept: INTERNAL MEDICINE | Facility: CLINIC | Age: 68
End: 2018-10-16

## 2018-10-16 NOTE — TELEPHONE ENCOUNTER
Wrong patient did not mean to call him yesterday. Called and  And could not leave a message to disregard the call.

## 2018-11-02 ENCOUNTER — TELEPHONE (OUTPATIENT)
Dept: INTERNAL MEDICINE | Facility: CLINIC | Age: 68
End: 2018-11-02

## 2018-11-02 NOTE — TELEPHONE ENCOUNTER
Called patient no answer left message to let him know he is scheduled to est care fro 11/13 at 2:30pm.

## 2018-11-02 NOTE — TELEPHONE ENCOUNTER
Please book this pt in the 2:30 slot on 11/13. EPP or whatever 30 minute appointment goes there. Let me know when complete. It is a new pt establish care.

## 2018-11-13 ENCOUNTER — OFFICE VISIT (OUTPATIENT)
Dept: INTERNAL MEDICINE | Facility: CLINIC | Age: 68
End: 2018-11-13
Payer: COMMERCIAL

## 2018-11-13 ENCOUNTER — ANTI-COAG VISIT (OUTPATIENT)
Dept: CARDIOLOGY | Facility: CLINIC | Age: 68
End: 2018-11-13
Payer: COMMERCIAL

## 2018-11-13 VITALS
HEART RATE: 95 BPM | DIASTOLIC BLOOD PRESSURE: 72 MMHG | SYSTOLIC BLOOD PRESSURE: 122 MMHG | WEIGHT: 198.19 LBS | OXYGEN SATURATION: 98 % | BODY MASS INDEX: 26.84 KG/M2 | HEIGHT: 72 IN

## 2018-11-13 DIAGNOSIS — I77.89 AORTIC ROOT ENLARGEMENT: ICD-10-CM

## 2018-11-13 DIAGNOSIS — Z11.59 NEED FOR HEPATITIS C SCREENING TEST: ICD-10-CM

## 2018-11-13 DIAGNOSIS — M25.511 CHRONIC RIGHT SHOULDER PAIN: ICD-10-CM

## 2018-11-13 DIAGNOSIS — K80.20 CALCULUS OF GALLBLADDER WITHOUT CHOLECYSTITIS WITHOUT OBSTRUCTION: ICD-10-CM

## 2018-11-13 DIAGNOSIS — Z79.01 LONG TERM (CURRENT) USE OF ANTICOAGULANTS: Primary | ICD-10-CM

## 2018-11-13 DIAGNOSIS — Z00.00 ROUTINE PHYSICAL EXAMINATION: Primary | ICD-10-CM

## 2018-11-13 DIAGNOSIS — Q23.1 BICUSPID AORTIC VALVE: ICD-10-CM

## 2018-11-13 DIAGNOSIS — I48.91 ATRIAL FIBRILLATION, UNSPECIFIED TYPE: ICD-10-CM

## 2018-11-13 DIAGNOSIS — G89.29 CHRONIC RIGHT SHOULDER PAIN: ICD-10-CM

## 2018-11-13 DIAGNOSIS — Z12.5 SCREENING FOR PROSTATE CANCER: ICD-10-CM

## 2018-11-13 LAB — INR PPP: 2.7 (ref 2–3)

## 2018-11-13 PROCEDURE — 99999 PR PBB SHADOW E&M-EST. PATIENT-LVL III: CPT | Mod: PBBFAC,,, | Performed by: INTERNAL MEDICINE

## 2018-11-13 PROCEDURE — 99397 PER PM REEVAL EST PAT 65+ YR: CPT | Mod: S$GLB,,, | Performed by: INTERNAL MEDICINE

## 2018-11-13 PROCEDURE — 85610 PROTHROMBIN TIME: CPT | Mod: QW,S$GLB,, | Performed by: INTERNAL MEDICINE

## 2018-11-13 RX ORDER — ZOLPIDEM TARTRATE 10 MG/1
10 TABLET ORAL NIGHTLY PRN
Qty: 10 TABLET | Refills: 1 | Status: SHIPPED | OUTPATIENT
Start: 2018-11-13 | End: 2019-11-14 | Stop reason: SDUPTHER

## 2018-11-13 NOTE — PROGRESS NOTES
Patient INR in therapeutic range today. Reports no bleeding, bruising or other changes. Will maintain current weekly dose until follow up in 8 weeks. Advised patient to call with any concerns or changes.

## 2018-11-13 NOTE — PROGRESS NOTES
Subjective:       Patient ID: Navjot Fine is a 68 y.o. male.    Chief Complaint: Establish Care    HPI:  New patient to me here to establish care.  He sees Cardiology for bicuspid aortic valve and atrial fibrillatio and dilated aortic root.  His prior doctor associated with [facility] has retired.  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  He has a list of questions to discuss.  He occasionally has right shoulder pain and was told he might need surgery but he wishes to wait until he retires.  He symptomatically treated and is able to exercise so he will continue same.  He was told on evaluation in the past that he had gallstones but is not interested in surgery.  He had an acute abdominal pain in the past that may have been a gallbladder attack but none since and is not interested in having surgery at this time.  He was certainly notify us for any new or recurrent attacks.  The he says his wife complains that he has increased gas and belching.  I suspect this relates to food intake as he has good bowel movements, denies indigestion or pain. No constipation.  He is up-to-date with colon screening.  He is also seen an outside urologist for erectile dysfunction  He denies any symptoms related to AFib or his vascular system.  Cardiology notes reviewed      Review of Systems   Constitutional: Negative for chills, fatigue, fever and unexpected weight change.   HENT: Negative for ear pain, nosebleeds, sore throat and trouble swallowing.    Eyes: Negative for pain and visual disturbance.   Respiratory: Negative for cough, shortness of breath and wheezing.    Cardiovascular: Negative for chest pain, palpitations and leg swelling.   Gastrointestinal: Negative for abdominal pain, constipation, diarrhea, nausea and vomiting.        Occasional intestinal gas or belching   Genitourinary: Negative for difficulty urinating, frequency and hematuria.   Musculoskeletal: Positive for arthralgias  (Right shoulder). Negative for back pain, myalgias, neck pain and neck stiffness.   Skin: Negative for rash and wound.   Neurological: Negative for dizziness, numbness and headaches.   Hematological: Negative for adenopathy. Does not bruise/bleed easily.   Psychiatric/Behavioral: Positive for sleep disturbance ( insomnia with transAtlantic flights). Negative for dysphoric mood and suicidal ideas. The patient is not nervous/anxious.        Objective:      Physical Exam   Constitutional: He is oriented to person, place, and time. He appears well-developed and well-nourished. No distress.   HENT:   Head: Normocephalic and atraumatic.   Right Ear: External ear normal.   Left Ear: External ear normal.   Nose: Nose normal. No rhinorrhea.   Mouth/Throat: Oropharynx is clear and moist and mucous membranes are normal. No oropharyngeal exudate.   Eyes: Conjunctivae and EOM are normal. Pupils are equal, round, and reactive to light.   Neck: Normal range of motion. Neck supple. No JVD present. No thyromegaly present.   No supraclavicular nodes palpated bilaterally.    Cardiovascular: Normal rate, regular rhythm, normal heart sounds and intact distal pulses.   No murmur heard.  Pulmonary/Chest: Effort normal and breath sounds normal. He has no wheezes. He has no rales.   Abdominal: Soft. Bowel sounds are normal. He exhibits no distension and no mass. There is no tenderness.   Musculoskeletal: Normal range of motion. He exhibits no edema or tenderness.   Lymphadenopathy:     He has no cervical adenopathy.        Right: No supraclavicular adenopathy present.        Left: No supraclavicular adenopathy present.   Neurological: He is alert and oriented to person, place, and time. He has normal reflexes. No cranial nerve deficit.   Reflex Scores:       Bicep reflexes are 2+ on the right side and 2+ on the left side.       Patellar reflexes are 2+ on the right side and 2+ on the left side.  Skin: Skin is warm and dry. No rash noted.    Psychiatric: He has a normal mood and affect. His behavior is normal. He does not exhibit a depressed mood.       Assessment:       1. Routine physical examination    2. Atrial fibrillation, unspecified type    3. Bicuspid aortic valve    4. Aortic root enlargement    5. Chronic right shoulder pain    6. Calculus of gallbladder without cholecystitis without obstruction    7. Need for hepatitis C screening test    8. Screening for prostate cancer        Plan:       Navjot was seen today for establish care.    Diagnoses and all orders for this visit:    Routine physical examination  -     PSA, Screening; Future  -     Lipid panel; Future  -     CBC auto differential; Future  -     Comprehensive metabolic panel; Future  -     Hemoglobin A1c; Future  -     Hepatitis C antibody; Future    Atrial fibrillation, unspecified type    Bicuspid aortic valve    Aortic root enlargement    Chronic right shoulder pain    Calculus of gallbladder without cholecystitis without obstruction    Need for hepatitis C screening test  -     Hepatitis C antibody; Future    Screening for prostate cancer  -     PSA, Screening; Future    Other orders  -     zolpidem (AMBIEN) 10 mg Tab; Take 1 tablet (10 mg total) by mouth nightly as needed.

## 2018-11-13 NOTE — LETTER
[facility] - Coumadin  [address]  [facility] [address]  Phone: [phone]  Fax: [phone]     Dear Mr. Morfin,   Our records indicate an appointment was missed to have a PT/INR checked.  We have made multiple attempts to contact you by phone and were unsuccessful.  It is extremely important that our clinic is contacted immediately in order to reschedule the missed appointment, as it is important that you are monitored regularly while taking Coumadin (warfarin).  If monitoring by our clinic is no longer required, please call and inform our clinic so that we may update our records and discontinue further attempts to reach you.      Please call the Coumadin Clinic immediately upon receiving this letter, failure to do so may result in discharge from the clinic.  No response to this letter within 7 days will result in discharge from our clinic.      Contact information for the Coumadin Clinic:    Phone: [phone]    Fax: [phone]      Thank You,    [facility] Staff

## 2018-11-15 ENCOUNTER — TELEPHONE (OUTPATIENT)
Dept: INTERNAL MEDICINE | Facility: CLINIC | Age: 68
End: 2018-11-15

## 2018-11-15 ENCOUNTER — LAB VISIT (OUTPATIENT)
Dept: LAB | Facility: HOSPITAL | Age: 68
End: 2018-11-15
Attending: INTERNAL MEDICINE
Payer: COMMERCIAL

## 2018-11-15 DIAGNOSIS — Z11.59 NEED FOR HEPATITIS C SCREENING TEST: ICD-10-CM

## 2018-11-15 DIAGNOSIS — Z00.00 ROUTINE PHYSICAL EXAMINATION: ICD-10-CM

## 2018-11-15 DIAGNOSIS — Z12.5 SCREENING FOR PROSTATE CANCER: ICD-10-CM

## 2018-11-15 LAB
ALBUMIN SERPL BCP-MCNC: 4.3 G/DL
ALP SERPL-CCNC: 66 U/L
ALT SERPL W/O P-5'-P-CCNC: 23 U/L
ANION GAP SERPL CALC-SCNC: 8 MMOL/L
AST SERPL-CCNC: 39 U/L
BASOPHILS # BLD AUTO: 0.04 K/UL
BASOPHILS NFR BLD: 0.8 %
BILIRUB SERPL-MCNC: 0.9 MG/DL
BUN SERPL-MCNC: 28 MG/DL
CALCIUM SERPL-MCNC: 9.7 MG/DL
CHLORIDE SERPL-SCNC: 105 MMOL/L
CHOLEST SERPL-MCNC: 189 MG/DL
CHOLEST/HDLC SERPL: 3.5 {RATIO}
CO2 SERPL-SCNC: 27 MMOL/L
COMPLEXED PSA SERPL-MCNC: 2.6 NG/ML
CREAT SERPL-MCNC: 0.9 MG/DL
DIFFERENTIAL METHOD: ABNORMAL
EOSINOPHIL # BLD AUTO: 0.1 K/UL
EOSINOPHIL NFR BLD: 1.5 %
ERYTHROCYTE [DISTWIDTH] IN BLOOD BY AUTOMATED COUNT: 11.4 %
EST. GFR  (AFRICAN AMERICAN): >60 ML/MIN/1.73 M^2
EST. GFR  (NON AFRICAN AMERICAN): >60 ML/MIN/1.73 M^2
ESTIMATED AVG GLUCOSE: 88 MG/DL
GLUCOSE SERPL-MCNC: 91 MG/DL
HBA1C MFR BLD HPLC: 4.7 %
HCT VFR BLD AUTO: 43 %
HCV AB SERPL QL IA: NEGATIVE
HDLC SERPL-MCNC: 54 MG/DL
HDLC SERPL: 28.6 %
HGB BLD-MCNC: 13.9 G/DL
IMM GRANULOCYTES # BLD AUTO: 0.03 K/UL
IMM GRANULOCYTES NFR BLD AUTO: 0.6 %
LDLC SERPL CALC-MCNC: 119.6 MG/DL
LYMPHOCYTES # BLD AUTO: 1.4 K/UL
LYMPHOCYTES NFR BLD: 29.3 %
MCH RBC QN AUTO: 32 PG
MCHC RBC AUTO-ENTMCNC: 32.3 G/DL
MCV RBC AUTO: 99 FL
MONOCYTES # BLD AUTO: 0.5 K/UL
MONOCYTES NFR BLD: 11.1 %
NEUTROPHILS # BLD AUTO: 2.7 K/UL
NEUTROPHILS NFR BLD: 56.7 %
NONHDLC SERPL-MCNC: 135 MG/DL
NRBC BLD-RTO: 0 /100 WBC
PLATELET # BLD AUTO: 199 K/UL
PMV BLD AUTO: 12.1 FL
POTASSIUM SERPL-SCNC: 4.1 MMOL/L
PROT SERPL-MCNC: 6.9 G/DL
RBC # BLD AUTO: 4.34 M/UL
SODIUM SERPL-SCNC: 140 MMOL/L
TRIGL SERPL-MCNC: 77 MG/DL
WBC # BLD AUTO: 4.78 K/UL

## 2018-11-15 PROCEDURE — 86803 HEPATITIS C AB TEST: CPT

## 2018-11-15 PROCEDURE — 85025 COMPLETE CBC W/AUTO DIFF WBC: CPT

## 2018-11-15 PROCEDURE — 84153 ASSAY OF PSA TOTAL: CPT

## 2018-11-15 PROCEDURE — 36415 COLL VENOUS BLD VENIPUNCTURE: CPT

## 2018-11-15 PROCEDURE — 80061 LIPID PANEL: CPT

## 2018-11-15 PROCEDURE — 80053 COMPREHEN METABOLIC PANEL: CPT

## 2018-11-15 PROCEDURE — 83036 HEMOGLOBIN GLYCOSYLATED A1C: CPT

## 2018-11-16 NOTE — TELEPHONE ENCOUNTER
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 If he would like a copy we can certainly send him a copy.

## 2019-01-08 NOTE — PROGRESS NOTES
Patient called 01/08/19 left mgs on Vm for nando appt 01/08/19 to 01/15/19. Patient caught up in meeting today.

## 2019-01-14 RX ORDER — TADALAFIL 20 MG/1
20 TABLET ORAL DAILY PRN
Qty: 6 TABLET | Refills: 5 | Status: SHIPPED | OUTPATIENT
Start: 2019-01-14 | End: 2020-11-04

## 2019-02-08 NOTE — PROGRESS NOTES
Called patient to schedule a follow up appointment. The patient wanted an appt that was after 4. Advised the patient that the only late appointment time was 2:45pm. He is scheduled for 2/19/19. Advised the patient that I was going to drop an appt slip in the mail. He stated that he type it in the computer.

## 2019-02-28 NOTE — PROGRESS NOTES
Called both numbers on the patient phone to get him scheduled for his cancelled appt. The patient did not answer or the emergency contact did not answer. Pt cancelled 3 appts and no showed to the other.

## 2019-03-04 NOTE — PROGRESS NOTES
We have attempted to contact this patient multiple times. I will send a letter of intent to discharge to him and Dr. Sosa. If there is no response in 7 days he will be discharged from the coumadin clinic

## 2019-03-13 DIAGNOSIS — I48.91 ATRIAL FIBRILLATION, UNSPECIFIED TYPE: ICD-10-CM

## 2019-03-13 RX ORDER — DILTIAZEM HYDROCHLORIDE 240 MG/1
240 CAPSULE, COATED, EXTENDED RELEASE ORAL DAILY
Qty: 90 CAPSULE | Refills: 3 | Status: SHIPPED | OUTPATIENT
Start: 2019-03-13 | End: 2019-03-18 | Stop reason: SDUPTHER

## 2019-03-14 ENCOUNTER — TELEPHONE (OUTPATIENT)
Dept: CARDIOTHORACIC SURGERY | Facility: CLINIC | Age: 69
End: 2019-03-14

## 2019-03-14 DIAGNOSIS — I77.89 AORTIC ROOT ENLARGEMENT: Primary | ICD-10-CM

## 2019-03-14 NOTE — TELEPHONE ENCOUNTER
Spoke to patient, scheduled patient for CT non con (per Dr. Tadeo) on 8-20, however was unable to schedule Echo or appt w Dr. Tadeo since the date is further out than 3 months. Patient will call back in April to schedule rest.----- Message from Lachelle Maguire sent at 3/14/2019  9:30 AM CDT -----  Reason for call: Pt calling to schedule a 1 year follow up after August 15th.         Communication Preference:[phone]    Additional Information:

## 2019-03-18 DIAGNOSIS — I48.91 ATRIAL FIBRILLATION, UNSPECIFIED TYPE: ICD-10-CM

## 2019-03-19 RX ORDER — DILTIAZEM HYDROCHLORIDE 240 MG/1
240 CAPSULE, COATED, EXTENDED RELEASE ORAL DAILY
Qty: 90 CAPSULE | Refills: 3 | Status: SHIPPED | OUTPATIENT
Start: 2019-03-19 | End: 2019-10-10 | Stop reason: SDUPTHER

## 2019-03-20 NOTE — PROGRESS NOTES
Called the patient to get him rescheduled for his missed appointment on 2/19/19. Unable to leave message on patient's voicemail due to mailbox being full. Will send out letter.

## 2019-03-25 NOTE — PROGRESS NOTES
Patient called to reschedule 2/19/19 missed coumadin clinic appointment, it was rescheduled to 3/29/19

## 2019-03-29 ENCOUNTER — ANTI-COAG VISIT (OUTPATIENT)
Dept: CARDIOLOGY | Facility: CLINIC | Age: 69
End: 2019-03-29
Payer: COMMERCIAL

## 2019-03-29 DIAGNOSIS — Z79.01 LONG TERM (CURRENT) USE OF ANTICOAGULANTS: Primary | ICD-10-CM

## 2019-03-29 LAB — INR PPP: 2.5 (ref 2–3)

## 2019-03-29 PROCEDURE — 99211 OFF/OP EST MAY X REQ PHY/QHP: CPT | Mod: 25,S$GLB,,

## 2019-03-29 PROCEDURE — 99211 PR OFFICE/OUTPT VISIT, EST, LEVL I: ICD-10-PCS | Mod: 25,S$GLB,,

## 2019-03-29 PROCEDURE — 85610 POCT INR: ICD-10-PCS | Mod: QW,S$GLB,, | Performed by: INTERNAL MEDICINE

## 2019-03-29 PROCEDURE — 85610 PROTHROMBIN TIME: CPT | Mod: QW,S$GLB,, | Performed by: INTERNAL MEDICINE

## 2019-03-29 NOTE — PROGRESS NOTES
INR within therapeutic range today. Bruising noted to arms from use. Patient denies any bleeding or other changes that would affect warfarin therapy. Will maintain current dose and follow up in 8 weeks . Patient was re-educated on situations that would require placing a call to the Coumadin  Clinic, including bleeding or unusual bruising issues, changes in health, diet or medications,upcoming procedures that require warfarin interruption, and missed Coumadin dose(s). Patient expressed understanding that avoidance of consistency with these parameters could cause fluctuations in INR, leading to more frequent visits and increase risk of adverse events. Care plan made with BRIGHT ROBERTS

## 2019-04-24 ENCOUNTER — TELEPHONE (OUTPATIENT)
Dept: CARDIOLOGY | Facility: HOSPITAL | Age: 69
End: 2019-04-24

## 2019-04-24 NOTE — TELEPHONE ENCOUNTER
Spoke to patient.  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  Send appointment slip by mail.

## 2019-04-25 ENCOUNTER — TELEPHONE (OUTPATIENT)
Dept: INTERNAL MEDICINE | Facility: CLINIC | Age: 69
End: 2019-04-25

## 2019-04-25 NOTE — TELEPHONE ENCOUNTER
Spoke to pt----informed him he is due for Tdap, Hep B 3 dose, and Zoster vaccine. Advise to go to this local pharmacy.

## 2019-04-25 NOTE — TELEPHONE ENCOUNTER
----- Message from Citlalli Saeed sent at 4/25/2019  9:37 AM CDT -----  Contact: Patient [phone]  Pt states that he is calling to speak with the nurse in regards to some shots that he needs. Please call back and advise.      Thanks

## 2019-04-26 DIAGNOSIS — I77.89 AORTIC ROOT ENLARGEMENT: Primary | ICD-10-CM

## 2019-04-30 ENCOUNTER — CLINICAL SUPPORT (OUTPATIENT)
Dept: URGENT CARE | Facility: CLINIC | Age: 69
End: 2019-04-30

## 2019-04-30 DIAGNOSIS — Z78.9 NO HISTORY OF VACCINATION FOR MEASLES, MUMPS, RUBELLA (MMR): Primary | ICD-10-CM

## 2019-04-30 PROCEDURE — 86765 RUBEOLA ANTIBODY: CPT

## 2019-05-04 LAB
RUBEOLA IGG ANTIBODY: 2.38 ISR (ref 0–0.9)
RUBEOLA INTERPRETATION: POSITIVE

## 2019-05-05 ENCOUNTER — TELEPHONE (OUTPATIENT)
Dept: URGENT CARE | Facility: CLINIC | Age: 69
End: 2019-05-05

## 2019-05-13 ENCOUNTER — TELEPHONE (OUTPATIENT)
Dept: CARDIOLOGY | Facility: HOSPITAL | Age: 69
End: 2019-05-13

## 2019-06-03 ENCOUNTER — ANTI-COAG VISIT (OUTPATIENT)
Dept: CARDIOLOGY | Facility: CLINIC | Age: 69
End: 2019-06-03
Payer: COMMERCIAL

## 2019-06-03 DIAGNOSIS — I48.91 ATRIAL FIBRILLATION, UNSPECIFIED TYPE: ICD-10-CM

## 2019-06-03 DIAGNOSIS — Z79.01 LONG TERM (CURRENT) USE OF ANTICOAGULANTS: Primary | ICD-10-CM

## 2019-06-03 DIAGNOSIS — I48.20 CHRONIC ATRIAL FIBRILLATION: ICD-10-CM

## 2019-06-03 LAB — INR PPP: 2 (ref 2–3)

## 2019-06-03 PROCEDURE — 93793 ANTICOAG MGMT PT WARFARIN: CPT | Mod: S$GLB,,,

## 2019-06-03 PROCEDURE — 85610 PROTHROMBIN TIME: CPT | Mod: QW,S$GLB,, | Performed by: INTERNAL MEDICINE

## 2019-06-03 PROCEDURE — 93793 PR ANTICOAGULANT MGMT FOR PT TAKING WARFARIN: ICD-10-PCS | Mod: S$GLB,,,

## 2019-06-03 PROCEDURE — 85610 POCT INR: ICD-10-PCS | Mod: QW,S$GLB,, | Performed by: INTERNAL MEDICINE

## 2019-06-03 NOTE — PROGRESS NOTES
19INR at goal. Medications and chart reviewed. No changes noted to necessitate adjustment of warfarin or follow-up plan. See calendar.  Pt findings: Pt missed doses about 2 weeks ago; and denies any other changes. Pt will maintain current regimen & next follow-up will be 8/20/19 as he refused sooner follow since he will be out of town.  Patient was re-educated on situations that would require placing a call to the Coumadin Clinic, including bleeding or unusual bruising issues, changes in health, diet or medications,upcoming procedures that require warfarin interruption, and missed Coumadin dose(s). Patient expressed understanding that avoidance of consistency with these parameters could cause fluctuations in INR, leading to more frequent visits and increase risk of adverse events.

## 2019-06-04 RX ORDER — ATENOLOL 50 MG/1
50 TABLET ORAL DAILY
Qty: 30 TABLET | Refills: 11 | Status: SHIPPED | OUTPATIENT
Start: 2019-06-04 | End: 2019-07-29 | Stop reason: SDUPTHER

## 2019-07-29 DIAGNOSIS — I48.91 ATRIAL FIBRILLATION, UNSPECIFIED TYPE: ICD-10-CM

## 2019-07-29 RX ORDER — ATENOLOL 50 MG/1
50 TABLET ORAL DAILY
Qty: 30 TABLET | Refills: 11 | Status: SHIPPED | OUTPATIENT
Start: 2019-07-29 | End: 2019-10-04 | Stop reason: SDUPTHER

## 2019-08-20 ENCOUNTER — ANTI-COAG VISIT (OUTPATIENT)
Dept: CARDIOLOGY | Facility: CLINIC | Age: 69
End: 2019-08-20
Payer: COMMERCIAL

## 2019-08-20 ENCOUNTER — HOSPITAL ENCOUNTER (OUTPATIENT)
Dept: RADIOLOGY | Facility: HOSPITAL | Age: 69
Discharge: HOME | End: 2019-08-20
Attending: THORACIC SURGERY (CARDIOTHORACIC VASCULAR SURGERY)
Payer: COMMERCIAL

## 2019-08-20 ENCOUNTER — HOSPITAL ENCOUNTER (OUTPATIENT)
Dept: CARDIOLOGY | Facility: CLINIC | Age: 69
Discharge: HOME | End: 2019-08-20
Attending: THORACIC SURGERY (CARDIOTHORACIC VASCULAR SURGERY)
Payer: COMMERCIAL

## 2019-08-20 ENCOUNTER — OFFICE VISIT (OUTPATIENT)
Dept: CARDIOTHORACIC SURGERY | Facility: CLINIC | Age: 69
End: 2019-08-20
Payer: COMMERCIAL

## 2019-08-20 VITALS
WEIGHT: 198 LBS | HEIGHT: 72 IN | SYSTOLIC BLOOD PRESSURE: 116 MMHG | DIASTOLIC BLOOD PRESSURE: 60 MMHG | BODY MASS INDEX: 26.82 KG/M2 | HEART RATE: 70 BPM

## 2019-08-20 VITALS
OXYGEN SATURATION: 98 % | HEART RATE: 72 BPM | HEIGHT: 72 IN | SYSTOLIC BLOOD PRESSURE: 110 MMHG | DIASTOLIC BLOOD PRESSURE: 81 MMHG | TEMPERATURE: 98 F | WEIGHT: 188.69 LBS | BODY MASS INDEX: 25.56 KG/M2

## 2019-08-20 DIAGNOSIS — I48.91 ATRIAL FIBRILLATION, UNSPECIFIED TYPE: ICD-10-CM

## 2019-08-20 DIAGNOSIS — Z79.01 LONG TERM (CURRENT) USE OF ANTICOAGULANTS: Primary | ICD-10-CM

## 2019-08-20 DIAGNOSIS — I77.89 AORTIC ROOT ENLARGEMENT: ICD-10-CM

## 2019-08-20 DIAGNOSIS — I48.91 ATRIAL FIBRILLATION, UNSPECIFIED TYPE: Primary | ICD-10-CM

## 2019-08-20 DIAGNOSIS — Z79.01 LONG TERM CURRENT USE OF ANTICOAGULANT THERAPY: ICD-10-CM

## 2019-08-20 DIAGNOSIS — I48.20 CHRONIC ATRIAL FIBRILLATION: ICD-10-CM

## 2019-08-20 DIAGNOSIS — Q23.1 BICUSPID AORTIC VALVE: ICD-10-CM

## 2019-08-20 LAB
ASCENDING AORTA: 4.5 CM
AV INDEX (PROSTH): 0.32
AV MEAN GRADIENT: 8 MMHG
AV PEAK GRADIENT: 12 MMHG
AV VALVE AREA: 1.43 CM2
AV VELOCITY RATIO: 0.32
BSA FOR ECHO PROCEDURE: 2.14 M2
CV ECHO LV RWT: 0.32 CM
DOP CALC AO PEAK VEL: 1.72 M/S
DOP CALC AO VTI: 33.63 CM
DOP CALC LVOT AREA: 4.5 CM2
DOP CALC LVOT DIAMETER: 2.39 CM
DOP CALC LVOT PEAK VEL: 0.55 M/S
DOP CALC LVOT STROKE VOLUME: 48.2 CM3
DOP CALCLVOT PEAK VEL VTI: 10.75 CM
ECHO LV POSTERIOR WALL: 0.74 CM (ref 0.6–1.1)
FRACTIONAL SHORTENING: 36 % (ref 28–44)
INR PPP: 2.6 (ref 2–3)
INTERVENTRICULAR SEPTUM: 0.71 CM (ref 0.6–1.1)
LA MAJOR: 6.85 CM
LA MINOR: 7.65 CM
LA WIDTH: 5.89 CM
LEFT ATRIUM SIZE: 4.23 CM
LEFT ATRIUM VOLUME INDEX: 72.2 ML/M2
LEFT ATRIUM VOLUME: 153.07 CM3
LEFT INTERNAL DIMENSION IN SYSTOLE: 2.97 CM (ref 2.1–4)
LEFT VENTRICLE DIASTOLIC VOLUME INDEX: 46.54 ML/M2
LEFT VENTRICLE DIASTOLIC VOLUME: 98.72 ML
LEFT VENTRICLE MASS INDEX: 50 G/M2
LEFT VENTRICLE SYSTOLIC VOLUME INDEX: 16.1 ML/M2
LEFT VENTRICLE SYSTOLIC VOLUME: 34.25 ML
LEFT VENTRICULAR INTERNAL DIMENSION IN DIASTOLE: 4.63 CM (ref 3.5–6)
LEFT VENTRICULAR MASS: 105.02 G
PISA TR MAX VEL: 2.05 M/S
RA MAJOR: 6.71 CM
RA PRESSURE: 15 MMHG
RA WIDTH: 3.29 CM
RIGHT VENTRICULAR END-DIASTOLIC DIMENSION: 4.03 CM
SINUS: 3.95 CM
STJ: 3.08 CM
TR MAX PG: 17 MMHG
TRICUSPID ANNULAR PLANE SYSTOLIC EXCURSION: 2.16 CM
TV REST PULMONARY ARTERY PRESSURE: 32 MMHG

## 2019-08-20 PROCEDURE — 93793 PR ANTICOAGULANT MGMT FOR PT TAKING WARFARIN: ICD-10-PCS | Mod: S$GLB,,,

## 2019-08-20 PROCEDURE — 3288F FALL RISK ASSESSMENT DOCD: CPT | Mod: CPTII,S$GLB,, | Performed by: THORACIC SURGERY (CARDIOTHORACIC VASCULAR SURGERY)

## 2019-08-20 PROCEDURE — 85610 PROTHROMBIN TIME: CPT | Mod: QW,S$GLB,, | Performed by: INTERNAL MEDICINE

## 2019-08-20 PROCEDURE — 99999 PR PBB SHADOW E&M-EST. PATIENT-LVL III: ICD-10-PCS | Mod: PBBFAC,,, | Performed by: THORACIC SURGERY (CARDIOTHORACIC VASCULAR SURGERY)

## 2019-08-20 PROCEDURE — 99215 OFFICE O/P EST HI 40 MIN: CPT | Mod: S$GLB,,, | Performed by: THORACIC SURGERY (CARDIOTHORACIC VASCULAR SURGERY)

## 2019-08-20 PROCEDURE — 71250 CT THORAX DX C-: CPT | Mod: 26,,, | Performed by: RADIOLOGY

## 2019-08-20 PROCEDURE — 93306 TTE W/DOPPLER COMPLETE: CPT | Mod: 26,,, | Performed by: INTERNAL MEDICINE

## 2019-08-20 PROCEDURE — 1100F PTFALLS ASSESS-DOCD GE2>/YR: CPT | Mod: CPTII,S$GLB,, | Performed by: THORACIC SURGERY (CARDIOTHORACIC VASCULAR SURGERY)

## 2019-08-20 PROCEDURE — 93306 TRANSTHORACIC ECHO (TTE) COMPLETE (CUPID ONLY): ICD-10-PCS | Mod: 26,,, | Performed by: INTERNAL MEDICINE

## 2019-08-20 PROCEDURE — 99999 PR PBB SHADOW E&M-EST. PATIENT-LVL III: CPT | Mod: PBBFAC,,, | Performed by: THORACIC SURGERY (CARDIOTHORACIC VASCULAR SURGERY)

## 2019-08-20 PROCEDURE — 71250 CT CHEST WITHOUT CONTRAST: ICD-10-PCS | Mod: 26,,, | Performed by: RADIOLOGY

## 2019-08-20 PROCEDURE — 71250 CT THORAX DX C-: CPT | Mod: TC

## 2019-08-20 PROCEDURE — 99215 PR OFFICE/OUTPT VISIT, EST, LEVL V, 40-54 MIN: ICD-10-PCS | Mod: S$GLB,,, | Performed by: THORACIC SURGERY (CARDIOTHORACIC VASCULAR SURGERY)

## 2019-08-20 PROCEDURE — 93793 ANTICOAG MGMT PT WARFARIN: CPT | Mod: S$GLB,,,

## 2019-08-20 PROCEDURE — 85610 POCT INR: ICD-10-PCS | Mod: QW,S$GLB,, | Performed by: INTERNAL MEDICINE

## 2019-08-20 PROCEDURE — 93306 TTE W/DOPPLER COMPLETE: CPT

## 2019-08-20 PROCEDURE — 3288F PR FALLS RISK ASSESSMENT DOCUMENTED: ICD-10-PCS | Mod: CPTII,S$GLB,, | Performed by: THORACIC SURGERY (CARDIOTHORACIC VASCULAR SURGERY)

## 2019-08-20 PROCEDURE — 1100F PR PT FALLS ASSESS DOC 2+ FALLS/FALL W/INJURY/YR: ICD-10-PCS | Mod: CPTII,S$GLB,, | Performed by: THORACIC SURGERY (CARDIOTHORACIC VASCULAR SURGERY)

## 2019-08-20 NOTE — PROGRESS NOTES
INR at goal. Medications and chart reviewed. No changes noted to necessitate adjustment of warfarin or follow-up plan. See calendar.  Pt findings: Pt has been doing intermittent fasting which has caused him to lose 12lbs in 2months and denies any other changes.  Will maintain current regimen & re-assess in 7 weeks.   Patient was re-educated on situations that would require placing a call to the Coumadin Clinic, including bleeding or unusual bruising issues, changes in health, diet or medications,upcoming procedures that require warfarin interruption, and missed Coumadin dose(s). Patient expressed understanding that avoidance of consistency with these parameters could cause fluctuations in INR, leading to more frequent visits and increase risk of adverse events.

## 2019-08-20 NOTE — PROGRESS NOTES
Subjective:      Patient ID: Navjot Fine is a 69 y.o. male.    Chief Complaint: No chief complaint on file.      HPI:  Navjot Fine is a 69 y.o. male who presents for 1 year follow up bicuspid aortic valve and TAA (4.3cm 2018 on CT).  He has atrial fib and see's Dr. Sosa.  He is on coumadin. He is active and continues to exercise. Got a Fitbit and reports walking 5+ miles regularly. 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 Has been doing intermittent fasting. He denies shortness of breath, PND, orthopnea, swelling, palpitations, dizziness, or fatigue. Overall feels good.     Current medications Reviewed    Review of Systems   Constitutional: Negative for activity change and fatigue.   HENT: Negative for nosebleeds.    Eyes: Negative for visual disturbance.   Respiratory: Negative for cough and shortness of breath.    Cardiovascular: Negative for chest pain, palpitations and leg swelling.   Gastrointestinal: Negative for nausea and vomiting.   Musculoskeletal: Negative for gait problem.   Skin: Negative for color change and pallor.   Neurological: Negative for dizziness, seizures, syncope, weakness, numbness and headaches.   Hematological: Does not bruise/bleed easily.   Psychiatric/Behavioral: Negative for sleep disturbance.     Objective:   Physical Exam   Constitutional: He is oriented to person, place, and time. He appears well-developed and well-nourished. No distress.   HENT:   Head: Normocephalic and atraumatic.   Eyes: Pupils are equal, round, and reactive to light. EOM are normal.   Neck: Normal range of motion.   Cardiovascular: Normal rate, regular rhythm and normal pulses.   Murmur heard.  Pulmonary/Chest: Effort normal. No respiratory distress.   Abdominal: Soft. He exhibits no distension.   Musculoskeletal: Normal range of motion. He exhibits no edema.   Neurological: He is alert and oriented to person, place, and time.   Skin: Skin is warm, dry and  intact. Capillary refill takes less than 2 seconds. He is not diaphoretic. No erythema. No pallor.   Psychiatric: He has a normal mood and affect. His speech is normal and behavior is normal. Judgment and thought content normal.   Vitals reviewed.      Diagnotic Results: reviewed   TTE 8/20/19  · Low normal left ventricular systolic function. The estimated ejection fraction is 53%  · Local segmental wall motion abnormalities.  · Septal wall has abnormal motion.  · Normal LV diastolic function.  · Severe left atrial enlargement.  · Normal right ventricular systolic function.  · Severe right atrial enlargement.  · Moderate aortic valve stenosis.  · Aortic valve area is 1.43 cm2; peak velocity is 1.72 m/s; mean gradient is 8 mmHg.  · The aortic valve is bileaflet.  · Mild mitral sclerosis.  · Mild-to-moderate mitral regurgitation.  · Mild to moderate tricuspid regurgitation.  · Elevated central venous pressure (15 mm Hg).  · The estimated PA systolic pressure is 32 mm Hg  · The ascending aorta is moderately dilated.    TTE 2018  1 - Normal left ventricular systolic function (EF 60-65%).     2 - Normal right ventricular systolic function .     3 - Biatrial enlargement.     4 - Moderately enlarged ascending aorta (45mm in diameter).     5 - Bi-leaflet aortic valve with aortic sclerosis.     6 - Mild mitral regurgitation.     7 - Mild tricuspid regurgitation.     8 - The estimated PA systolic pressure is 32 mmHg.     9 - Increased central venous pressure.     CT Chest Images reviewed [id number]    Assessment:   1. Bicuspid Aortic Valve   2. Moderate AS   3. Enlarged Aortic Root   4. A Fib - on coumadin followed by Dr. Sosa   Plan:       CTS Attending Note:    I have personally taken the history and examined this patient and agree with the SUNNI's note as stated above. Very pleasant 69-year-old gentleman followed for bicuspid aortic valve with mild stenosis and an ascending aortic aneurysm.  I reviewed both his echo and his  CT scan.  His ascending aorta is roughly 4.5 cm in diameter.  While this would be considered at indication for surgery in the setting of severe aortic stenosis, his degree of aortic stenosis is only mild.  He is asymptomatic for this, and leads an active lifestyle.  I will plan to see him back in 1 year with a repeat echo and CT scan.

## 2019-09-30 ENCOUNTER — HOSPITAL ENCOUNTER (OUTPATIENT)
Dept: CARDIOLOGY | Facility: CLINIC | Age: 69
Discharge: HOME | End: 2019-09-30
Payer: COMMERCIAL

## 2019-09-30 ENCOUNTER — OFFICE VISIT (OUTPATIENT)
Dept: ELECTROPHYSIOLOGY | Facility: CLINIC | Age: 69
End: 2019-09-30
Payer: COMMERCIAL

## 2019-09-30 VITALS
SYSTOLIC BLOOD PRESSURE: 124 MMHG | BODY MASS INDEX: 24.79 KG/M2 | DIASTOLIC BLOOD PRESSURE: 67 MMHG | WEIGHT: 183 LBS | HEIGHT: 72 IN | HEART RATE: 114 BPM

## 2019-09-30 DIAGNOSIS — I48.20 CHRONIC ATRIAL FIBRILLATION: Primary | ICD-10-CM

## 2019-09-30 DIAGNOSIS — I77.89 AORTIC ROOT ENLARGEMENT: ICD-10-CM

## 2019-09-30 DIAGNOSIS — Q23.1 BICUSPID AORTIC VALVE: ICD-10-CM

## 2019-09-30 DIAGNOSIS — I48.91 ATRIAL FIBRILLATION, UNSPECIFIED TYPE: ICD-10-CM

## 2019-09-30 PROCEDURE — 99999 PR PBB SHADOW E&M-EST. PATIENT-LVL III: ICD-10-PCS | Mod: PBBFAC,,, | Performed by: INTERNAL MEDICINE

## 2019-09-30 PROCEDURE — 93005 RHYTHM STRIP: ICD-10-PCS | Mod: S$GLB,,, | Performed by: INTERNAL MEDICINE

## 2019-09-30 PROCEDURE — 99999 PR PBB SHADOW E&M-EST. PATIENT-LVL III: CPT | Mod: PBBFAC,,, | Performed by: INTERNAL MEDICINE

## 2019-09-30 PROCEDURE — 99214 PR OFFICE/OUTPT VISIT, EST, LEVL IV, 30-39 MIN: ICD-10-PCS | Mod: S$GLB,,, | Performed by: INTERNAL MEDICINE

## 2019-09-30 PROCEDURE — 93005 ELECTROCARDIOGRAM TRACING: CPT | Mod: S$GLB,,, | Performed by: INTERNAL MEDICINE

## 2019-09-30 PROCEDURE — 1101F PT FALLS ASSESS-DOCD LE1/YR: CPT | Mod: CPTII,S$GLB,, | Performed by: INTERNAL MEDICINE

## 2019-09-30 PROCEDURE — 93010 RHYTHM STRIP: ICD-10-PCS | Mod: S$GLB,,, | Performed by: INTERNAL MEDICINE

## 2019-09-30 PROCEDURE — 99214 OFFICE O/P EST MOD 30 MIN: CPT | Mod: S$GLB,,, | Performed by: INTERNAL MEDICINE

## 2019-09-30 PROCEDURE — 1101F PR PT FALLS ASSESS DOC 0-1 FALLS W/OUT INJ PAST YR: ICD-10-PCS | Mod: CPTII,S$GLB,, | Performed by: INTERNAL MEDICINE

## 2019-09-30 PROCEDURE — 93010 ELECTROCARDIOGRAM REPORT: CPT | Mod: S$GLB,,, | Performed by: INTERNAL MEDICINE

## 2019-09-30 NOTE — PROGRESS NOTES
Subjective:    Patient ID:  Navjot Fine is a 69 y.o. male who presents for follow-up of Atrial Fibrillation      HPI 68 yo male with h/o atrial fibrillation, bicuspid aortic valve, ascending aortic root enlargement.    Background:    Has had longstanding chronic AF, for which a rate control strategy has been employed.  Has always been very active, (>09373 steps per day).  Echo 7/13/18 EF 60-65% Ascending aorta 4.5 cm.  Follows with Dr. Tadeo for his Ascending aortic root aneurysm and bicuspid valve.    Update:  Echo 8/20/19 EF 53% LORIN 73 LIZETTE 1.43 (Bicuspid valve), mild to moderate MR, mild to moderate TR  ECG reveals atrial fibrillation with RVR.  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  Feels jittery at this time.  Does not normally feel this.  Remains very active.  Still doing >>> 20 K steps a day.  Is doing intermittent fasting.  Has lost 15 lbs.      Review of Systems   Constitution: Negative. Negative for malaise/fatigue.   Cardiovascular: Negative for chest pain, dyspnea on exertion, irregular heartbeat, leg swelling, near-syncope, orthopnea, palpitations, paroxysmal nocturnal dyspnea and syncope.   Respiratory: Negative for cough and shortness of breath.    Neurological: Negative for dizziness, light-headedness and weakness.   All other systems reviewed and are negative.       Objective:    Physical Exam   Constitutional: He is oriented to person, place, and time. He appears well-developed and well-nourished.   Eyes: Conjunctivae are normal. No scleral icterus.   Neck: No JVD present. No tracheal deviation present.   Cardiovascular: Normal heart sounds. An irregularly irregular rhythm present. Tachycardia present. PMI is not displaced.   Pulmonary/Chest: Effort normal and breath sounds normal. No respiratory distress.   Abdominal: Soft. There is no hepatosplenomegaly. There is no tenderness.   Musculoskeletal: He exhibits no edema (lower extremity) or tenderness.   Neurological: He  is alert and oriented to person, place, and time.   Skin: Skin is warm and dry. No rash noted.   Psychiatric: He has a normal mood and affect. His behavior is normal.         Assessment:       1. Chronic atrial fibrillation    2. Bicuspid aortic valve    3. Aortic root enlargement         Plan:             Chronic atrial fibrillation.  His HR is elevated today, but I believe this is related to lifestyle (fasting + coffee).  We discussed optimizing his lifestyle to reduce HR without interfering with his quality of life and his active life.  F/u in one year.

## 2019-10-03 RX ORDER — WARFARIN SODIUM 5 MG/1
TABLET ORAL
Qty: 90 TABLET | Refills: 2 | Status: SHIPPED | OUTPATIENT
Start: 2019-10-03 | End: 2020-06-15

## 2019-10-03 RX ORDER — WARFARIN 2 MG/1
TABLET ORAL
Qty: 90 TABLET | Refills: 2
Start: 2019-10-03 | End: 2020-06-15

## 2019-10-04 DIAGNOSIS — I48.91 ATRIAL FIBRILLATION, UNSPECIFIED TYPE: ICD-10-CM

## 2019-10-04 RX ORDER — ATENOLOL 50 MG/1
50 TABLET ORAL DAILY
Qty: 30 TABLET | Refills: 11 | Status: SHIPPED | OUTPATIENT
Start: 2019-10-04 | End: 2020-10-12

## 2019-10-10 DIAGNOSIS — I48.91 ATRIAL FIBRILLATION, UNSPECIFIED TYPE: ICD-10-CM

## 2019-10-10 RX ORDER — DILTIAZEM HYDROCHLORIDE 240 MG/1
240 CAPSULE, COATED, EXTENDED RELEASE ORAL DAILY
Qty: 90 CAPSULE | Refills: 3 | Status: SHIPPED | OUTPATIENT
Start: 2019-10-10 | End: 2020-01-09 | Stop reason: SDUPTHER

## 2019-10-31 ENCOUNTER — TELEPHONE (OUTPATIENT)
Dept: ADMINISTRATIVE | Facility: HOSPITAL | Age: 69
End: 2019-10-31

## 2019-10-31 ENCOUNTER — PATIENT OUTREACH (OUTPATIENT)
Dept: ADMINISTRATIVE | Facility: HOSPITAL | Age: 69
End: 2019-10-31

## 2019-10-31 DIAGNOSIS — Z00.00 ANNUAL PHYSICAL EXAM: ICD-10-CM

## 2019-10-31 DIAGNOSIS — Z00.00 ANNUAL PHYSICAL EXAM: Primary | ICD-10-CM

## 2019-10-31 DIAGNOSIS — Z12.5 PROSTATE CANCER SCREENING: Primary | ICD-10-CM

## 2019-11-07 NOTE — PROGRESS NOTES
Patient states he has been taking Coumadin per calendar and no changes to other home medications.  Chart routed to pharmacist to review.

## 2019-11-08 ENCOUNTER — TELEPHONE (OUTPATIENT)
Dept: INTERNAL MEDICINE | Facility: CLINIC | Age: 69
End: 2019-11-08

## 2019-11-08 ENCOUNTER — LAB VISIT (OUTPATIENT)
Dept: LAB | Facility: HOSPITAL | Age: 69
End: 2019-11-08
Attending: INTERNAL MEDICINE
Payer: COMMERCIAL

## 2019-11-08 ENCOUNTER — ANTI-COAG VISIT (OUTPATIENT)
Dept: CARDIOLOGY | Facility: CLINIC | Age: 69
End: 2019-11-08
Payer: COMMERCIAL

## 2019-11-08 DIAGNOSIS — Z79.01 LONG TERM (CURRENT) USE OF ANTICOAGULANTS: ICD-10-CM

## 2019-11-08 DIAGNOSIS — Z00.00 ANNUAL PHYSICAL EXAM: ICD-10-CM

## 2019-11-08 DIAGNOSIS — Z12.5 PROSTATE CANCER SCREENING: ICD-10-CM

## 2019-11-08 LAB
ALBUMIN SERPL BCP-MCNC: 4.2 G/DL (ref 3.5–5.2)
ALP SERPL-CCNC: 61 U/L (ref 55–135)
ALT SERPL W/O P-5'-P-CCNC: 25 U/L (ref 10–44)
ANION GAP SERPL CALC-SCNC: 6 MMOL/L (ref 8–16)
AST SERPL-CCNC: 34 U/L (ref 10–40)
BASOPHILS # BLD AUTO: 0.02 K/UL (ref 0–0.2)
BASOPHILS NFR BLD: 0.5 % (ref 0–1.9)
BILIRUB SERPL-MCNC: 0.9 MG/DL (ref 0.1–1)
BUN SERPL-MCNC: 23 MG/DL (ref 8–23)
CALCIUM SERPL-MCNC: 9.4 MG/DL (ref 8.7–10.5)
CHLORIDE SERPL-SCNC: 105 MMOL/L (ref 95–110)
CHOLEST SERPL-MCNC: 174 MG/DL (ref 120–199)
CHOLEST/HDLC SERPL: 3.3 {RATIO} (ref 2–5)
CO2 SERPL-SCNC: 28 MMOL/L (ref 23–29)
COMPLEXED PSA SERPL-MCNC: 2.6 NG/ML (ref 0–4)
CREAT SERPL-MCNC: 1 MG/DL (ref 0.5–1.4)
DIFFERENTIAL METHOD: ABNORMAL
EOSINOPHIL # BLD AUTO: 0.1 K/UL (ref 0–0.5)
EOSINOPHIL NFR BLD: 1.6 % (ref 0–8)
ERYTHROCYTE [DISTWIDTH] IN BLOOD BY AUTOMATED COUNT: 11.6 % (ref 11.5–14.5)
EST. GFR  (AFRICAN AMERICAN): >60 ML/MIN/1.73 M^2
EST. GFR  (NON AFRICAN AMERICAN): >60 ML/MIN/1.73 M^2
ESTIMATED AVG GLUCOSE: 88 MG/DL (ref 68–131)
GLUCOSE SERPL-MCNC: 87 MG/DL (ref 70–110)
HBA1C MFR BLD HPLC: 4.7 % (ref 4–5.6)
HCT VFR BLD AUTO: 37.2 % (ref 40–54)
HDLC SERPL-MCNC: 53 MG/DL (ref 40–75)
HDLC SERPL: 30.5 % (ref 20–50)
HGB BLD-MCNC: 12.8 G/DL (ref 14–18)
INR PPP: 2.5 (ref 0.8–1.2)
LDLC SERPL CALC-MCNC: 109.8 MG/DL (ref 63–159)
LYMPHOCYTES # BLD AUTO: 1.3 K/UL (ref 1–4.8)
LYMPHOCYTES NFR BLD: 29.5 % (ref 18–48)
MCH RBC QN AUTO: 33.3 PG (ref 27–31)
MCHC RBC AUTO-ENTMCNC: 34.4 G/DL (ref 32–36)
MCV RBC AUTO: 97 FL (ref 82–98)
MONOCYTES # BLD AUTO: 0.5 K/UL (ref 0.3–1)
MONOCYTES NFR BLD: 10.9 % (ref 4–15)
NEUTROPHILS # BLD AUTO: 2.5 K/UL (ref 1.8–7.7)
NEUTROPHILS NFR BLD: 57.5 % (ref 38–73)
NONHDLC SERPL-MCNC: 121 MG/DL
PLATELET # BLD AUTO: 182 K/UL (ref 150–350)
PMV BLD AUTO: 11.6 FL (ref 9.2–12.9)
POTASSIUM SERPL-SCNC: 4.7 MMOL/L (ref 3.5–5.1)
PROT SERPL-MCNC: 6.5 G/DL (ref 6–8.4)
PROTHROMBIN TIME: 25.2 SEC (ref 9–12.5)
RBC # BLD AUTO: 3.84 M/UL (ref 4.6–6.2)
SODIUM SERPL-SCNC: 139 MMOL/L (ref 136–145)
TRIGL SERPL-MCNC: 56 MG/DL (ref 30–150)
TSH SERPL DL<=0.005 MIU/L-ACNC: 1.36 UIU/ML (ref 0.4–4)
WBC # BLD AUTO: 4.3 K/UL (ref 3.9–12.7)

## 2019-11-08 PROCEDURE — 80061 LIPID PANEL: CPT

## 2019-11-08 PROCEDURE — 84443 ASSAY THYROID STIM HORMONE: CPT

## 2019-11-08 PROCEDURE — 85610 PROTHROMBIN TIME: CPT

## 2019-11-08 PROCEDURE — 93793 ANTICOAG MGMT PT WARFARIN: CPT | Mod: S$GLB,,,

## 2019-11-08 PROCEDURE — 85025 COMPLETE CBC W/AUTO DIFF WBC: CPT

## 2019-11-08 PROCEDURE — 36415 COLL VENOUS BLD VENIPUNCTURE: CPT

## 2019-11-08 PROCEDURE — 84153 ASSAY OF PSA TOTAL: CPT

## 2019-11-08 PROCEDURE — 83036 HEMOGLOBIN GLYCOSYLATED A1C: CPT

## 2019-11-08 PROCEDURE — 93793 PR ANTICOAGULANT MGMT FOR PT TAKING WARFARIN: ICD-10-PCS | Mod: S$GLB,,,

## 2019-11-08 PROCEDURE — 80053 COMPREHEN METABOLIC PANEL: CPT

## 2019-11-10 ENCOUNTER — TELEPHONE (OUTPATIENT)
Dept: INTERNAL MEDICINE | Facility: CLINIC | Age: 69
End: 2019-11-10

## 2019-11-10 NOTE — TELEPHONE ENCOUNTER
Labs are showing slow decline in hemoglobin. He recently had a C-scope. Will discuss at the visit. Need stool check, iron studies, B12 and Folic acid as the MVC is high normal.  Need to check whether the pt donated blood recently.

## 2019-11-14 ENCOUNTER — LAB VISIT (OUTPATIENT)
Dept: LAB | Facility: HOSPITAL | Age: 69
End: 2019-11-14
Attending: INTERNAL MEDICINE
Payer: COMMERCIAL

## 2019-11-14 ENCOUNTER — OFFICE VISIT (OUTPATIENT)
Dept: INTERNAL MEDICINE | Facility: CLINIC | Age: 69
End: 2019-11-14
Attending: INTERNAL MEDICINE
Payer: COMMERCIAL

## 2019-11-14 ENCOUNTER — TELEPHONE (OUTPATIENT)
Dept: INTERNAL MEDICINE | Facility: CLINIC | Age: 69
End: 2019-11-14

## 2019-11-14 VITALS
HEART RATE: 72 BPM | SYSTOLIC BLOOD PRESSURE: 106 MMHG | DIASTOLIC BLOOD PRESSURE: 62 MMHG | BODY MASS INDEX: 25.77 KG/M2 | OXYGEN SATURATION: 97 % | WEIGHT: 190.06 LBS

## 2019-11-14 DIAGNOSIS — N52.9 ERECTILE DYSFUNCTION, UNSPECIFIED ERECTILE DYSFUNCTION TYPE: ICD-10-CM

## 2019-11-14 DIAGNOSIS — Z00.00 ROUTINE PHYSICAL EXAMINATION: Primary | ICD-10-CM

## 2019-11-14 DIAGNOSIS — D64.9 ANEMIA, UNSPECIFIED TYPE: ICD-10-CM

## 2019-11-14 DIAGNOSIS — D64.9 ANEMIA, UNSPECIFIED TYPE: Primary | ICD-10-CM

## 2019-11-14 DIAGNOSIS — Q23.1 BICUSPID AORTIC VALVE: ICD-10-CM

## 2019-11-14 DIAGNOSIS — I48.91 ATRIAL FIBRILLATION, UNSPECIFIED TYPE: ICD-10-CM

## 2019-11-14 LAB
BILIRUB UR QL STRIP: NEGATIVE
CLARITY UR REFRACT.AUTO: CLEAR
COLOR UR AUTO: NORMAL
GLUCOSE UR QL STRIP: NEGATIVE
HGB UR QL STRIP: NEGATIVE
KETONES UR QL STRIP: NEGATIVE
LEUKOCYTE ESTERASE UR QL STRIP: NEGATIVE
NITRITE UR QL STRIP: NEGATIVE
PH UR STRIP: 6 [PH] (ref 5–8)
PROT UR QL STRIP: NEGATIVE
SP GR UR STRIP: 1 (ref 1–1.03)
URN SPEC COLLECT METH UR: NORMAL

## 2019-11-14 PROCEDURE — 99999 PR PBB SHADOW E&M-EST. PATIENT-LVL III: ICD-10-PCS | Mod: PBBFAC,,, | Performed by: INTERNAL MEDICINE

## 2019-11-14 PROCEDURE — 99397 PER PM REEVAL EST PAT 65+ YR: CPT | Mod: S$GLB,,, | Performed by: INTERNAL MEDICINE

## 2019-11-14 PROCEDURE — 99999 PR PBB SHADOW E&M-EST. PATIENT-LVL III: CPT | Mod: PBBFAC,,, | Performed by: INTERNAL MEDICINE

## 2019-11-14 PROCEDURE — 99397 PR PREVENTIVE VISIT,EST,65 & OVER: ICD-10-PCS | Mod: S$GLB,,, | Performed by: INTERNAL MEDICINE

## 2019-11-14 PROCEDURE — 81003 URINALYSIS AUTO W/O SCOPE: CPT

## 2019-11-14 RX ORDER — ZOLPIDEM TARTRATE 10 MG/1
10 TABLET ORAL NIGHTLY PRN
Qty: 10 TABLET | Refills: 2 | Status: SHIPPED | OUTPATIENT
Start: 2019-11-14 | End: 2020-11-04 | Stop reason: SDUPTHER

## 2019-11-14 NOTE — PROGRESS NOTES
Subjective:       Patient ID: Navjot Fine is a 69 y.o. male.    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  He comes in for his annual exam.  He fell during class a week or so ago and had an abrasion to the right arm and had some bruising.  He had some x-ray says hip and there was no bony abnormalities.  He did not think he blood much and that is important because he is on a blood thinner but is blood count came back with hemoglobin below 13.  It was 13.9 last year and was 13.7  2 years ago.  Fifteen years ago it was normal.  He denies any blood in the urine or stool.  He does have a bicuspid aortic valve and I will ask his cardiothoracic surgeon if that could by chance be destroying red cells.  He tells me a story years ago that when he had his 1st hip replacement he was told that he bled a lot when they cut the bone.  His blood count was never unusual so it was never worked up.  It has never really come up again and he thought if he bruised it was because he was on Coumadin.  The 2nd hip surgery went fine.    Review of Systems   Constitutional: Negative for chills, fatigue, fever and unexpected weight change.   HENT: Negative for ear pain and sore throat.    Eyes: Negative for pain and visual disturbance.   Respiratory: Negative for cough, shortness of breath and wheezing.    Cardiovascular: Negative for chest pain and leg swelling.   Gastrointestinal: Negative for abdominal pain, constipation, diarrhea, nausea and vomiting.   Genitourinary: Negative for difficulty urinating, frequency and hematuria.   Musculoskeletal: Positive for arthralgias (Mild residual hip soreness after fall). Negative for back pain, myalgias, neck pain and neck stiffness.   Skin: Negative for rash and wound.   Neurological: Negative for dizziness, numbness and headaches.   Hematological: Negative for adenopathy.   Psychiatric/Behavioral: Negative for dysphoric mood. The  patient is not nervous/anxious.        Objective:      Physical Exam   Constitutional: He is oriented to person, place, and time. He appears well-developed and well-nourished. No distress.   HENT:   Head: Normocephalic and atraumatic.   Right Ear: External ear normal.   Left Ear: External ear normal.   Nose: Nose normal. No rhinorrhea.   Mouth/Throat: Oropharynx is clear and moist and mucous membranes are normal. No oropharyngeal exudate.   Eyes: Pupils are equal, round, and reactive to light. Conjunctivae and EOM are normal.   Neck: Normal range of motion. Neck supple. No JVD present. No thyromegaly present.   No supraclavicular nodes palpated bilaterally.    Cardiovascular: Normal rate, regular rhythm, normal heart sounds and intact distal pulses.   No murmur heard.  Pulmonary/Chest: Effort normal and breath sounds normal. He has no wheezes. He has no rales.   Abdominal: Soft. Bowel sounds are normal. He exhibits no distension and no mass. There is no tenderness.   Genitourinary: Rectum normal. Rectal exam shows no tenderness and guaiac negative stool. Prostate is enlarged. Prostate is not tender.   Genitourinary Comments: Heme negative.   Control +   Musculoskeletal: Normal range of motion. He exhibits no edema or tenderness.   Lymphadenopathy:     He has no cervical adenopathy.        Right: No supraclavicular adenopathy present.        Left: No supraclavicular adenopathy present.   Neurological: He is alert and oriented to person, place, and time. He has normal reflexes. No cranial nerve deficit.   Reflex Scores:       Bicep reflexes are 2+ on the right side and 2+ on the left side.       Patellar reflexes are 2+ on the right side and 2+ on the left side.  Skin: Skin is warm and dry. No rash noted.   Resolving bruise right forearm and elbow after fall     Psychiatric: He has a normal mood and affect. His behavior is normal. He does not exhibit a depressed mood.       Assessment:       1. Routine physical  examination    2. Anemia, unspecified type    3. Erectile dysfunction, unspecified erectile dysfunction type    4. Bicuspid aortic valve    5. Atrial fibrillation, unspecified type        Plan:       Navjot was seen today for annual exam.    Diagnoses and all orders for this visit:    Routine physical examination    Anemia, unspecified type  -     Vitamin B12; Future  -     Folate; Future  -     Ferritin; Future  -     Iron and TIBC; Future  -     Urinalysis; Future  -     Lactate dehydrogenase; Future    Erectile dysfunction, unspecified erectile dysfunction type  -     Testosterone; Future    Bicuspid aortic valve    Atrial fibrillation, unspecified type    Other orders  -     zolpidem (AMBIEN) 10 mg Tab; Take 1 tablet (10 mg total) by mouth nightly as needed.        patient is asking for testosterone level secondary to erectile dysfunction

## 2019-11-14 NOTE — TELEPHONE ENCOUNTER
Labs look good. All blood is fine. Waiting on urine. If negative, will plan to retest the CBC in 4-6 weeks.     Help with a CBC in 4-6 weeks.

## 2020-01-09 DIAGNOSIS — I48.91 ATRIAL FIBRILLATION, UNSPECIFIED TYPE: ICD-10-CM

## 2020-01-10 DIAGNOSIS — I48.91 ATRIAL FIBRILLATION, UNSPECIFIED TYPE: ICD-10-CM

## 2020-01-10 RX ORDER — DILTIAZEM HYDROCHLORIDE 240 MG/1
240 CAPSULE, COATED, EXTENDED RELEASE ORAL DAILY
Qty: 90 CAPSULE | Refills: 3 | Status: SHIPPED | OUTPATIENT
Start: 2020-01-10 | End: 2020-01-10 | Stop reason: SDUPTHER

## 2020-01-10 RX ORDER — DILTIAZEM HYDROCHLORIDE 240 MG/1
240 CAPSULE, COATED, EXTENDED RELEASE ORAL DAILY
Qty: 90 CAPSULE | Refills: 3 | Status: SHIPPED | OUTPATIENT
Start: 2020-01-10 | End: 2020-12-15 | Stop reason: SDUPTHER

## 2020-02-03 ENCOUNTER — TELEPHONE (OUTPATIENT)
Dept: INTERNAL MEDICINE | Facility: CLINIC | Age: 70
End: 2020-02-03

## 2020-02-18 ENCOUNTER — ANTI-COAG VISIT (OUTPATIENT)
Dept: CARDIOLOGY | Facility: CLINIC | Age: 70
End: 2020-02-18
Payer: COMMERCIAL

## 2020-02-18 ENCOUNTER — LAB VISIT (OUTPATIENT)
Dept: LAB | Facility: HOSPITAL | Age: 70
End: 2020-02-18
Attending: INTERNAL MEDICINE
Payer: COMMERCIAL

## 2020-02-18 DIAGNOSIS — D64.9 ANEMIA, UNSPECIFIED TYPE: ICD-10-CM

## 2020-02-18 DIAGNOSIS — Z79.01 LONG TERM (CURRENT) USE OF ANTICOAGULANTS: Primary | ICD-10-CM

## 2020-02-18 LAB
BASOPHILS # BLD AUTO: 0.03 K/UL (ref 0–0.2)
BASOPHILS NFR BLD: 0.7 % (ref 0–1.9)
DIFFERENTIAL METHOD: ABNORMAL
EOSINOPHIL # BLD AUTO: 0.1 K/UL (ref 0–0.5)
EOSINOPHIL NFR BLD: 1.4 % (ref 0–8)
ERYTHROCYTE [DISTWIDTH] IN BLOOD BY AUTOMATED COUNT: 11.4 % (ref 11.5–14.5)
HCT VFR BLD AUTO: 41.1 % (ref 40–54)
HGB BLD-MCNC: 13 G/DL (ref 14–18)
IMM GRANULOCYTES # BLD AUTO: 0.01 K/UL (ref 0–0.04)
IMM GRANULOCYTES NFR BLD AUTO: 0.2 % (ref 0–0.5)
INR PPP: 2.6 (ref 2–3)
LYMPHOCYTES # BLD AUTO: 1.2 K/UL (ref 1–4.8)
LYMPHOCYTES NFR BLD: 27.7 % (ref 18–48)
MCH RBC QN AUTO: 32.3 PG (ref 27–31)
MCHC RBC AUTO-ENTMCNC: 31.6 G/DL (ref 32–36)
MCV RBC AUTO: 102 FL (ref 82–98)
MONOCYTES # BLD AUTO: 0.4 K/UL (ref 0.3–1)
MONOCYTES NFR BLD: 10.4 % (ref 4–15)
NEUTROPHILS # BLD AUTO: 2.5 K/UL (ref 1.8–7.7)
NEUTROPHILS NFR BLD: 59.6 % (ref 38–73)
NRBC BLD-RTO: 0 /100 WBC
PLATELET # BLD AUTO: 201 K/UL (ref 150–350)
PMV BLD AUTO: 12.3 FL (ref 9.2–12.9)
RBC # BLD AUTO: 4.03 M/UL (ref 4.6–6.2)
WBC # BLD AUTO: 4.15 K/UL (ref 3.9–12.7)

## 2020-02-18 PROCEDURE — 93793 ANTICOAG MGMT PT WARFARIN: CPT | Mod: S$GLB,,, | Performed by: PHARMACIST

## 2020-02-18 PROCEDURE — 85610 POCT INR: ICD-10-PCS | Mod: QW,S$GLB,, | Performed by: INTERNAL MEDICINE

## 2020-02-18 PROCEDURE — 36415 COLL VENOUS BLD VENIPUNCTURE: CPT

## 2020-02-18 PROCEDURE — 85610 PROTHROMBIN TIME: CPT | Mod: QW,S$GLB,, | Performed by: INTERNAL MEDICINE

## 2020-02-18 PROCEDURE — 85025 COMPLETE CBC W/AUTO DIFF WBC: CPT

## 2020-02-18 PROCEDURE — 93793 PR ANTICOAGULANT MGMT FOR PT TAKING WARFARIN: ICD-10-PCS | Mod: S$GLB,,, | Performed by: PHARMACIST

## 2020-02-19 ENCOUNTER — TELEPHONE (OUTPATIENT)
Dept: INTERNAL MEDICINE | Facility: CLINIC | Age: 70
End: 2020-02-19

## 2020-02-20 NOTE — TELEPHONE ENCOUNTER
Please let patient know that his hemoglobin is up somewhat but not quite normal.  His hematocrit is now normal.  This looks to be going up.  I suspect this may be his normal because his last for blood counts have been up and down a little.  It is certainly not going in a downward trend.      Please let me know of any questions or concerns.     Spencer Beth MD [facility]  Internal Medicine

## 2020-03-31 NOTE — PROGRESS NOTES
3/31 -Called Patient and left message to call coumadin clinic, Left message that coumadin clinic is closed and his 4/14 coumadin clinic appointment needed to be rescheduled at the 2nd floor Lab for same day and time and to call coumadin clinic if any questions or is unable to keep this appointment

## 2020-04-02 NOTE — PROGRESS NOTES
Patient returned call 04/02/20 saying he is okay for right now. He would like his appt to be pushed back to May sometime. Please advise

## 2020-04-06 NOTE — PROGRESS NOTES
"States he will contact [facility] to schedule follow up appointment  "when life comes back to normal"  (COVID-19)  "

## 2020-06-05 DIAGNOSIS — I77.89 AORTIC ROOT ENLARGEMENT: Primary | ICD-10-CM

## 2020-06-05 DIAGNOSIS — Q23.1 BICUSPID AORTIC VALVE: Primary | ICD-10-CM

## 2020-06-17 ENCOUNTER — ANTI-COAG VISIT (OUTPATIENT)
Dept: CARDIOLOGY | Facility: CLINIC | Age: 70
End: 2020-06-17
Payer: COMMERCIAL

## 2020-06-17 ENCOUNTER — TELEPHONE (OUTPATIENT)
Dept: INTERNAL MEDICINE | Facility: CLINIC | Age: 70
End: 2020-06-17

## 2020-06-17 ENCOUNTER — LAB VISIT (OUTPATIENT)
Dept: LAB | Facility: HOSPITAL | Age: 70
End: 2020-06-17
Attending: FAMILY MEDICINE
Payer: COMMERCIAL

## 2020-06-17 ENCOUNTER — OFFICE VISIT (OUTPATIENT)
Dept: INTERNAL MEDICINE | Facility: CLINIC | Age: 70
End: 2020-06-17
Payer: COMMERCIAL

## 2020-06-17 VITALS
SYSTOLIC BLOOD PRESSURE: 116 MMHG | WEIGHT: 185.19 LBS | OXYGEN SATURATION: 98 % | HEIGHT: 72 IN | HEART RATE: 83 BPM | BODY MASS INDEX: 25.08 KG/M2 | TEMPERATURE: 98 F | DIASTOLIC BLOOD PRESSURE: 84 MMHG

## 2020-06-17 DIAGNOSIS — B02.9 HERPES ZOSTER WITHOUT COMPLICATION: Primary | ICD-10-CM

## 2020-06-17 DIAGNOSIS — K13.0 CELLULITIS, LIP: Primary | ICD-10-CM

## 2020-06-17 DIAGNOSIS — Z79.01 LONG TERM (CURRENT) USE OF ANTICOAGULANTS: ICD-10-CM

## 2020-06-17 DIAGNOSIS — K12.0 APHTHAE, ORAL: ICD-10-CM

## 2020-06-17 LAB
INR PPP: 2.1 (ref 0.8–1.2)
PROTHROMBIN TIME: 20.4 SEC (ref 9–12.5)

## 2020-06-17 PROCEDURE — 99214 OFFICE O/P EST MOD 30 MIN: CPT | Mod: S$GLB,,, | Performed by: FAMILY MEDICINE

## 2020-06-17 PROCEDURE — 99999 PR PBB SHADOW E&M-EST. PATIENT-LVL IV: ICD-10-PCS | Mod: PBBFAC,,, | Performed by: FAMILY MEDICINE

## 2020-06-17 PROCEDURE — 93793 PR ANTICOAGULANT MGMT FOR PT TAKING WARFARIN: ICD-10-PCS | Mod: S$GLB,,,

## 2020-06-17 PROCEDURE — 85610 PROTHROMBIN TIME: CPT

## 2020-06-17 PROCEDURE — 3008F BODY MASS INDEX DOCD: CPT | Mod: CPTII,S$GLB,, | Performed by: FAMILY MEDICINE

## 2020-06-17 PROCEDURE — 1126F PR PAIN SEVERITY QUANTIFIED, NO PAIN PRESENT: ICD-10-PCS | Mod: S$GLB,,, | Performed by: FAMILY MEDICINE

## 2020-06-17 PROCEDURE — 1159F MED LIST DOCD IN RCRD: CPT | Mod: S$GLB,,, | Performed by: FAMILY MEDICINE

## 2020-06-17 PROCEDURE — 1159F PR MEDICATION LIST DOCUMENTED IN MEDICAL RECORD: ICD-10-PCS | Mod: S$GLB,,, | Performed by: FAMILY MEDICINE

## 2020-06-17 PROCEDURE — 1101F PR PT FALLS ASSESS DOC 0-1 FALLS W/OUT INJ PAST YR: ICD-10-PCS | Mod: CPTII,S$GLB,, | Performed by: FAMILY MEDICINE

## 2020-06-17 PROCEDURE — 3008F PR BODY MASS INDEX (BMI) DOCUMENTED: ICD-10-PCS | Mod: CPTII,S$GLB,, | Performed by: FAMILY MEDICINE

## 2020-06-17 PROCEDURE — 1126F AMNT PAIN NOTED NONE PRSNT: CPT | Mod: S$GLB,,, | Performed by: FAMILY MEDICINE

## 2020-06-17 PROCEDURE — 93793 ANTICOAG MGMT PT WARFARIN: CPT | Mod: S$GLB,,,

## 2020-06-17 PROCEDURE — 99214 PR OFFICE/OUTPT VISIT, EST, LEVL IV, 30-39 MIN: ICD-10-PCS | Mod: S$GLB,,, | Performed by: FAMILY MEDICINE

## 2020-06-17 PROCEDURE — 36415 COLL VENOUS BLD VENIPUNCTURE: CPT

## 2020-06-17 PROCEDURE — 99999 PR PBB SHADOW E&M-EST. PATIENT-LVL IV: CPT | Mod: PBBFAC,,, | Performed by: FAMILY MEDICINE

## 2020-06-17 PROCEDURE — 1101F PT FALLS ASSESS-DOCD LE1/YR: CPT | Mod: CPTII,S$GLB,, | Performed by: FAMILY MEDICINE

## 2020-06-17 RX ORDER — SULFAMETHOXAZOLE AND TRIMETHOPRIM 800; 160 MG/1; MG/1
1 TABLET ORAL 2 TIMES DAILY
Qty: 20 TABLET | Refills: 0 | Status: SHIPPED | OUTPATIENT
Start: 2020-06-17 | End: 2020-06-27

## 2020-06-17 NOTE — PATIENT INSTRUCTIONS
Understanding Canker Sores  Canker sores are small, painful sores inside the mouth. They occur most often on the tongue, gums, or insides of the cheeks. The medical term for canker sores is aphthous ulcers.  What causes a canker sore?  The exact cause of canker sores is not known, but they are linked to a number of conditions. These include:  · An injury or irritation in the mouth, such as biting the inside of your cheek or braces rubbing  · Allergy or sensitivity to certain foods or substances, such as citrus juice or some kinds of toothpaste  · Poor nutrition  · Emotional stress  · Certain infections and illnesses  Canker sores tend to run in families.  What are the symptoms of a canker sore?  These are some common traits of canker sores:  · Sores are open and grayish-yellow, surrounded by redness.  · Sores are usually painful and sensitive to touch.  · Canker sores may be preceded by a burning or tingling sensation a few hours to a few days before the sore appears.  · Children and teens tend to get canker sores more often than adults.  How are canker sores treated?  Canker sores usually go away by themselves within 10 to 14 days. There is no cure for canker sores. Treatment focuses on relieving symptoms and shortening outbreaks. Treatments may include:  · Prescription or over-the-counter skin treatments to apply to the sores. Steroids for your skin (topical) may protect the canker sores from further irritation and allow them to heal. Topical pain relief medicines may numb the area and make the sores less painful.  · Certain types of toothpaste. These do not contain sodium lauryl sulfate. This type of toothpaste may prevent further aggravation of canker sores.  · Oral prescription medicines. These are used for severe cases to help relieve symptoms.  · Prescription or over-the-counter pain medicines. These help with discomfort.  What are the complications of a canker sore?  Mouth sores that seem to be canker  sores can be signs of a more serious illness. If you have other signs of illness along with mouth sores, you should talk with a healthcare provider. Canker sores can be so painful that they interfere with talking, eating, or drinking.  When should I call my healthcare provider?  Call your healthcare provider right away if you have any of these:  · Canker sores that dont go away after 2 weeks  · Canker sores that come back more than 3 times a year  · Canker sores that are larger than about a half-inch across  · Fever of 100.4°F (38°C) or higher, or as directed  · Pain that gets worse  · You arent able to eat or drink because of painful sores  · Symptoms that dont get better, or symptoms that get worse  · New symptoms   Date Last Reviewed: 5/1/2016  © 4176-0618 [employer]. [address] [place] [id number]. All rights reserved. This information is not intended as a substitute for professional medical care. Always follow your healthcare professional's instructions.

## 2020-06-17 NOTE — PROGRESS NOTES
"Subjective:       Patient ID: Navjot Fine is a 70 y.o. male.    Chief Complaint:   Mouth Lesions (bottom lip, inside of the mouth and nose/ face is very sensitive on the rt side.) and Fatigue    Mouth Lesions   The current episode started 3 to 5 days ago. The onset was gradual. The problem has been unchanged. The problem is moderate. Nothing relieves the symptoms. Nothing aggravates the symptoms. Associated symptoms include mouth sores. Pertinent negatives include no fever, no abdominal pain, no constipation, no cough, no URI and no wheezing.   Fatigue  Associated symptoms include fatigue. Pertinent negatives include no abdominal pain, coughing or fever.   Feels like the right side of his face is swollen on the inside.  Had "something in my right nostril that busted".    Review of Systems   Constitutional: Positive for fatigue. Negative for fever.   HENT: Positive for mouth sores.    Respiratory: Negative for cough and wheezing.    Gastrointestinal: Negative for abdominal pain and constipation.     Current Outpatient Medications   Medication Sig    atenolol (TENORMIN) 50 MG tablet Take 1 tablet (50 mg total) by mouth once daily.    b complex vitamins capsule Take 1 capsule by mouth once daily.    diltiaZEM (CARDIZEM CD) 240 MG 24 hr capsule Take 1 capsule (240 mg total) by mouth once daily.    tadalafil (CIALIS) 20 MG Tab Take 1 tablet (20 mg total) by mouth daily as needed.    VIAGRA 100 mg tablet TK 1 T PO PRN    warfarin (COUMADIN) 2 MG tablet TAKE 1 TABLET BY MOUTH DAILY EXCEPT TUESDAYS AND SATURDAYS OR AS DIRECTED BY COUMADIN CLINIC    warfarin (COUMADIN) 5 MG tablet TAKE 1 TABLET BY MOUTH 1 TIME DAILY    zolpidem (AMBIEN) 10 mg Tab Take 1 tablet (10 mg total) by mouth nightly as needed.     No current facility-administered medications for this visit.      Past Medical History:   Diagnosis Date    Atrial fibrillation     Cholelithiasis      History reviewed. No pertinent family history.  Social " History     Tobacco Use    Smoking status: Never Smoker    Smokeless tobacco: Never Used   Substance Use Topics    Alcohol use: Yes     Comment: [facility].    Drug use: Not on file       Objective:      Vitals:    06/17/20 1024   BP: 116/84   BP Location: Right arm   Patient Position: Sitting   BP Method: Medium (Manual)   Pulse: 83   Temp: 97.9 °F (36.6 °C)   SpO2: 98%   Weight: 84 kg (185 lb 3 oz)   Height: 6' (1.829 m)     Physical Exam  Vitals signs and nursing note reviewed.   Constitutional:       General: He is not in acute distress.     Appearance: He is well-developed. He is not diaphoretic.   HENT:      Head: Normocephalic and atraumatic.      Right Ear: Tympanic membrane normal. No tenderness.      Left Ear: Tympanic membrane normal. No tenderness.      Nose:      Comments: Healing scabbed open wound R lower lip.  Very mild edema.  No erythema or heat.  Aphthous ulcer R lower buccal mucosa.     Mouth/Throat:      Pharynx: Uvula midline.   Eyes:      Conjunctiva/sclera: Conjunctivae normal.      Pupils: Pupils are equal, round, and reactive to light.   Neck:      Musculoskeletal: Normal range of motion and neck supple.   Cardiovascular:      Rate and Rhythm: Rhythm regularly irregular.      Heart sounds: Normal heart sounds. No murmur. No friction rub. No gallop.    Pulmonary:      Effort: Pulmonary effort is normal.      Breath sounds: Normal breath sounds. No wheezing or rales.   Lymphadenopathy:      Cervical: Cervical adenopathy (R anterior tender cervical node) present.   Skin:     General: Skin is warm and dry.   Neurological:      Mental Status: He is alert and oriented to person, place, and time.   Psychiatric:         Behavior: Behavior normal.         Thought Content: Thought content normal.              Assessment and Plan:     Cellulitis, lip  -     sulfamethoxazole-trimethoprim 800-160mg (BACTRIM DS) 800-160 mg Tab; Take 1 tablet by mouth 2 (two) times daily. for 10 days  Dispense: 20 tablet;  Refill: 0    Aphthae, oral          Follow up if symptoms worsen or fail to improve.      Clayton Osullivan MD

## 2020-06-17 NOTE — TELEPHONE ENCOUNTER
"----- Message from Elvia Rodriguez sent at 6/17/2020  3:30 PM CDT -----  Regarding: Today's Visit  Contact: Self [phone]  Patient would like an call back form the nurse in regards to diagnose, patient states "Could I have shingles?,'' please advise.    "

## 2020-06-18 RX ORDER — VALACYCLOVIR HYDROCHLORIDE 1 G/1
1000 TABLET, FILM COATED ORAL 3 TIMES DAILY
Qty: 21 TABLET | Refills: 0 | Status: SHIPPED | OUTPATIENT
Start: 2020-06-18 | End: 2020-11-04

## 2020-06-18 NOTE — PROGRESS NOTES
INR at goal. Medications and chart reviewed. No changes noted to necessitate adjustment of warfarin or follow-up plan. See calendar.

## 2020-06-18 NOTE — TELEPHONE ENCOUNTER
Yes it is possible he has the shingles.  We don't know for sure because he doesn't have the shingles rash.  You CAN have shingles without the rash.  If he wants to take the medication for the shingles, I don't mind sending it in for him.  (It would be valacyclovir 3 times daily for 7 days.)

## 2020-07-30 NOTE — PROGRESS NOTES
Major DDI with warfarin and fluconazole. INR could be unreadable if patient has been on both for a week. Will hold warfarin dose tonight and need INR in am tomorrow.

## 2020-07-30 NOTE — PROGRESS NOTES
INR 7/29 missed. Need name of medication started recently. Do not recommend next INR as per patient request, regardless of new med, but do need new med info to determine if INR needed sooner than 8/4 (has other appointment same day).

## 2020-07-30 NOTE — PROGRESS NOTES
Pt states he is on a new medicine and been on it for about a week. Asked the patient the name of the medicine, he does not know. Advised him that it is not on his med list. He states it is not from [facility]. Advised him that he should notify the coumadin clinic of any new medicine that he starts to take. He states he will once he come in., in about a month He want labs scheduled for 8/21/2020 and that is when he will let the CC know of the new medicine. Advised him that I cannot make that decision but I will send a message to the PharmD and she will be able to decide that. He states he is in the middle of cooking and would like to talk later.

## 2020-07-31 ENCOUNTER — LAB VISIT (OUTPATIENT)
Dept: LAB | Facility: HOSPITAL | Age: 70
End: 2020-07-31
Attending: INTERNAL MEDICINE
Payer: COMMERCIAL

## 2020-07-31 ENCOUNTER — ANTI-COAG VISIT (OUTPATIENT)
Dept: CARDIOLOGY | Facility: CLINIC | Age: 70
End: 2020-07-31
Payer: COMMERCIAL

## 2020-07-31 DIAGNOSIS — Z79.01 LONG TERM (CURRENT) USE OF ANTICOAGULANTS: ICD-10-CM

## 2020-07-31 LAB
INR PPP: 2.4 (ref 0.8–1.2)
PROTHROMBIN TIME: 25.3 SEC (ref 9–12.5)

## 2020-07-31 PROCEDURE — 36415 COLL VENOUS BLD VENIPUNCTURE: CPT

## 2020-07-31 PROCEDURE — 93793 PR ANTICOAGULANT MGMT FOR PT TAKING WARFARIN: ICD-10-PCS | Mod: S$GLB,,,

## 2020-07-31 PROCEDURE — 93793 ANTICOAG MGMT PT WARFARIN: CPT | Mod: S$GLB,,,

## 2020-07-31 PROCEDURE — 85610 PROTHROMBIN TIME: CPT

## 2020-07-31 NOTE — PROGRESS NOTES
INR at goal. Unusual, patient does not seem to be having any significant interaction with fluconazole INR may have been slightly high since in range after held dose. Will decrease weekly dose slightly and repeat INR in 3 weeks. Patient to remain on fluconazole about 10 more weeks.

## 2020-08-03 ENCOUNTER — PATIENT OUTREACH (OUTPATIENT)
Dept: ADMINISTRATIVE | Facility: OTHER | Age: 70
End: 2020-08-03

## 2020-08-03 NOTE — PROGRESS NOTES
Patient's chart was reviewed for overdue HAI topics.  Immunizations reconciled.    Orders placed:n/a  Labs Linked:n/a

## 2020-08-04 ENCOUNTER — OFFICE VISIT (OUTPATIENT)
Dept: CARDIOTHORACIC SURGERY | Facility: CLINIC | Age: 70
End: 2020-08-04
Payer: COMMERCIAL

## 2020-08-04 ENCOUNTER — HOSPITAL ENCOUNTER (OUTPATIENT)
Dept: CARDIOLOGY | Facility: HOSPITAL | Age: 70
Discharge: HOME | End: 2020-08-04
Attending: THORACIC SURGERY (CARDIOTHORACIC VASCULAR SURGERY)
Payer: COMMERCIAL

## 2020-08-04 ENCOUNTER — HOSPITAL ENCOUNTER (OUTPATIENT)
Dept: RADIOLOGY | Facility: HOSPITAL | Age: 70
Discharge: HOME | End: 2020-08-04
Attending: THORACIC SURGERY (CARDIOTHORACIC VASCULAR SURGERY)
Payer: COMMERCIAL

## 2020-08-04 VITALS
OXYGEN SATURATION: 99 % | TEMPERATURE: 98 F | DIASTOLIC BLOOD PRESSURE: 73 MMHG | WEIGHT: 182 LBS | HEART RATE: 68 BPM | SYSTOLIC BLOOD PRESSURE: 110 MMHG | WEIGHT: 182.13 LBS | SYSTOLIC BLOOD PRESSURE: 117 MMHG | BODY MASS INDEX: 24.67 KG/M2 | DIASTOLIC BLOOD PRESSURE: 70 MMHG | HEART RATE: 69 BPM | BODY MASS INDEX: 24.65 KG/M2 | HEIGHT: 72 IN | HEIGHT: 72 IN

## 2020-08-04 DIAGNOSIS — Q23.1 BICUSPID AORTIC VALVE: ICD-10-CM

## 2020-08-04 DIAGNOSIS — I77.89 AORTIC ROOT ENLARGEMENT: ICD-10-CM

## 2020-08-04 DIAGNOSIS — Q23.1 BICUSPID AORTIC VALVE: Primary | ICD-10-CM

## 2020-08-04 DIAGNOSIS — I48.91 ATRIAL FIBRILLATION, UNSPECIFIED TYPE: ICD-10-CM

## 2020-08-04 DIAGNOSIS — I71.20 THORACIC AORTIC ANEURYSM, WITHOUT RUPTURE: ICD-10-CM

## 2020-08-04 LAB
ASCENDING AORTA: 4.34 CM
AV INDEX (PROSTH): 0.25
AV MEAN GRADIENT: 10 MMHG
AV PEAK GRADIENT: 18 MMHG
AV VALVE AREA: 1.07 CM2
AV VELOCITY RATIO: 0.3
BSA FOR ECHO PROCEDURE: 2.05 M2
CV ECHO LV RWT: 0.43 CM
DOP CALC AO PEAK VEL: 2.14 M/S
DOP CALC AO VTI: 47.35 CM
DOP CALC LVOT AREA: 4.3 CM2
DOP CALC LVOT DIAMETER: 2.33 CM
DOP CALC LVOT PEAK VEL: 0.64 M/S
DOP CALC LVOT STROKE VOLUME: 50.88 CM3
DOP CALCLVOT PEAK VEL VTI: 11.94 CM
E WAVE DECELERATION TIME: 183.46 MSEC
E/A RATIO: 1.38
E/E' RATIO: 6.91 M/S
ECHO LV POSTERIOR WALL: 0.97 CM (ref 0.6–1.1)
FRACTIONAL SHORTENING: 30 % (ref 28–44)
INTERVENTRICULAR SEPTUM: 0.95 CM (ref 0.6–1.1)
LA MAJOR: 7.3 CM
LA MINOR: 7.22 CM
LA WIDTH: 4.59 CM
LEFT ATRIUM SIZE: 4.05 CM
LEFT ATRIUM VOLUME INDEX: 56 ML/M2
LEFT ATRIUM VOLUME: 114.71 CM3
LEFT INTERNAL DIMENSION IN SYSTOLE: 3.17 CM (ref 2.1–4)
LEFT VENTRICLE DIASTOLIC VOLUME INDEX: 45.27 ML/M2
LEFT VENTRICLE DIASTOLIC VOLUME: 92.66 ML
LEFT VENTRICLE MASS INDEX: 71 G/M2
LEFT VENTRICLE SYSTOLIC VOLUME INDEX: 19.5 ML/M2
LEFT VENTRICLE SYSTOLIC VOLUME: 39.99 ML
LEFT VENTRICULAR INTERNAL DIMENSION IN DIASTOLE: 4.5 CM (ref 3.5–6)
LEFT VENTRICULAR MASS: 144.94 G
LV LATERAL E/E' RATIO: 6.91 M/S
LV SEPTAL E/E' RATIO: 6.91 M/S
MV PEAK A VEL: 0.55 M/S
MV PEAK E VEL: 0.76 M/S
MV STENOSIS PRESSURE HALF TIME: 53.2 MS
MV VALVE AREA P 1/2 METHOD: 4.14 CM2
PISA MRMAX VEL: 0.05 M/S
PISA TR MAX VEL: 2.27 M/S
PULM VEIN S/D RATIO: 0.9
PV PEAK D VEL: 0.52 M/S
PV PEAK S VEL: 0.47 M/S
RA MAJOR: 6.93 CM
RA PRESSURE: 15 MMHG
RA WIDTH: 3.82 CM
RIGHT VENTRICULAR END-DIASTOLIC DIMENSION: 4.83 CM
SINUS: 4.14 CM
STJ: 3.26 CM
TDI LATERAL: 0.11 M/S
TDI SEPTAL: 0.11 M/S
TDI: 0.11 M/S
TR MAX PG: 21 MMHG
TRICUSPID ANNULAR PLANE SYSTOLIC EXCURSION: 2.3 CM
TV REST PULMONARY ARTERY PRESSURE: 36 MMHG

## 2020-08-04 PROCEDURE — 1126F PR PAIN SEVERITY QUANTIFIED, NO PAIN PRESENT: ICD-10-PCS | Mod: S$GLB,,, | Performed by: THORACIC SURGERY (CARDIOTHORACIC VASCULAR SURGERY)

## 2020-08-04 PROCEDURE — 93306 ECHO (CUPID ONLY): ICD-10-PCS | Mod: 26,,, | Performed by: INTERNAL MEDICINE

## 2020-08-04 PROCEDURE — 99215 OFFICE O/P EST HI 40 MIN: CPT | Mod: S$GLB,,, | Performed by: THORACIC SURGERY (CARDIOTHORACIC VASCULAR SURGERY)

## 2020-08-04 PROCEDURE — 93306 TTE W/DOPPLER COMPLETE: CPT | Mod: 26,,, | Performed by: INTERNAL MEDICINE

## 2020-08-04 PROCEDURE — 71250 CT THORAX DX C-: CPT | Mod: 26,,, | Performed by: RADIOLOGY

## 2020-08-04 PROCEDURE — 1159F MED LIST DOCD IN RCRD: CPT | Mod: S$GLB,,, | Performed by: THORACIC SURGERY (CARDIOTHORACIC VASCULAR SURGERY)

## 2020-08-04 PROCEDURE — 3008F PR BODY MASS INDEX (BMI) DOCUMENTED: ICD-10-PCS | Mod: CPTII,S$GLB,, | Performed by: THORACIC SURGERY (CARDIOTHORACIC VASCULAR SURGERY)

## 2020-08-04 PROCEDURE — 99215 PR OFFICE/OUTPT VISIT, EST, LEVL V, 40-54 MIN: ICD-10-PCS | Mod: S$GLB,,, | Performed by: THORACIC SURGERY (CARDIOTHORACIC VASCULAR SURGERY)

## 2020-08-04 PROCEDURE — 1101F PR PT FALLS ASSESS DOC 0-1 FALLS W/OUT INJ PAST YR: ICD-10-PCS | Mod: CPTII,S$GLB,, | Performed by: THORACIC SURGERY (CARDIOTHORACIC VASCULAR SURGERY)

## 2020-08-04 PROCEDURE — 1126F AMNT PAIN NOTED NONE PRSNT: CPT | Mod: S$GLB,,, | Performed by: THORACIC SURGERY (CARDIOTHORACIC VASCULAR SURGERY)

## 2020-08-04 PROCEDURE — 93306 TTE W/DOPPLER COMPLETE: CPT

## 2020-08-04 PROCEDURE — 1101F PT FALLS ASSESS-DOCD LE1/YR: CPT | Mod: CPTII,S$GLB,, | Performed by: THORACIC SURGERY (CARDIOTHORACIC VASCULAR SURGERY)

## 2020-08-04 PROCEDURE — 3008F BODY MASS INDEX DOCD: CPT | Mod: CPTII,S$GLB,, | Performed by: THORACIC SURGERY (CARDIOTHORACIC VASCULAR SURGERY)

## 2020-08-04 PROCEDURE — 99999 PR PBB SHADOW E&M-EST. PATIENT-LVL IV: ICD-10-PCS | Mod: PBBFAC,,, | Performed by: THORACIC SURGERY (CARDIOTHORACIC VASCULAR SURGERY)

## 2020-08-04 PROCEDURE — 71250 CT CHEST WITHOUT CONTRAST: ICD-10-PCS | Mod: 26,,, | Performed by: RADIOLOGY

## 2020-08-04 PROCEDURE — 1159F PR MEDICATION LIST DOCUMENTED IN MEDICAL RECORD: ICD-10-PCS | Mod: S$GLB,,, | Performed by: THORACIC SURGERY (CARDIOTHORACIC VASCULAR SURGERY)

## 2020-08-04 PROCEDURE — 71250 CT THORAX DX C-: CPT | Mod: TC

## 2020-08-04 PROCEDURE — 99999 PR PBB SHADOW E&M-EST. PATIENT-LVL IV: CPT | Mod: PBBFAC,,, | Performed by: THORACIC SURGERY (CARDIOTHORACIC VASCULAR SURGERY)

## 2020-08-04 RX ORDER — PREDNISONE 5 MG/1
TABLET ORAL
COMMUNITY
Start: 2020-07-07 | End: 2020-11-04

## 2020-08-04 RX ORDER — FLUCONAZOLE 150 MG/1
150 TABLET ORAL WEEKLY
COMMUNITY
Start: 2020-07-23

## 2020-08-04 RX ORDER — LULICONAZOLE 10 MG/G
CREAM TOPICAL
COMMUNITY
Start: 2020-07-27 | End: 2021-04-26

## 2020-08-04 NOTE — PROGRESS NOTES
Subjective:      Patient ID: Navjot Fine is a 70 y.o. male.    Chief Complaint: No chief complaint on file.      HPI:  Navjot Fine is a 70 y.o. male with a medical history significant for atrial fibrillation (on Coumadin), aortic stenosis (bicuspid) and TAA who presents at the CTS clinic for one year follow up. He reports being very active, walking 33372+ steps a day.   He denies a smoking history and reports good blood pressure control. Patient reports he is unsure if he has any family history of connective tissue disorders or bicuspid aortic valves. He denies any symptoms including chest pain, shortness of breath, increased fatigue or palpations.  Patient had ECHO today which reports aortic valve area is 1.07 cm2; peak velocity is 2.14 m/s; mean gradient is 10 mmHg and mild-moderate mitral regurgitation.  CT of chest done today and independently reviewed measuring 4.5cm.    Last CT of the chest reports stable 4.5 cm fusiform dilatation of the proximal ascending aorta and his echo reports aortic valve area is 1.43 cm2; peak velocity is 1.72 m/s; mean gradient is 8 mmHg.          Current Outpatient Medications:     atenolol (TENORMIN) 50 MG tablet, Take 1 tablet (50 mg total) by mouth once daily., Disp: 30 tablet, Rfl: 11    b complex vitamins capsule, Take 1 capsule by mouth once daily., Disp: , Rfl:     diltiaZEM (CARDIZEM CD) 240 MG 24 hr capsule, Take 1 capsule (240 mg total) by mouth once daily., Disp: 90 capsule, Rfl: 3    fluconazole (DIFLUCAN) 150 MG Tab, , Disp: , Rfl:     luliconazole 1 % Crea, APPLY AS A THIN LAYER TO THE AFFECTED AREA(S) PLUS A 1 INCH MARGIN OF HEALTHY SURROUNDING SKIN ONCE DAILY, Disp: , Rfl:     predniSONE (DELTASONE) 5 MG tablet, , Disp: , Rfl:     tadalafil (CIALIS) 20 MG Tab, Take 1 tablet (20 mg total) by mouth daily as needed., Disp: 6 tablet, Rfl: 5    VIAGRA 100 mg tablet, TK 1 T PO PRN, Disp: , Rfl: 7    warfarin (COUMADIN) 2 MG tablet, TAKE 1 TABLET BY MOUTH  DAILY EXCEPT TUESDAYS AND SATURDAYS OR AS DIRECTED BY COUMADIN CLINIC, Disp: 90 tablet, Rfl: 2    warfarin (COUMADIN) 5 MG tablet, TAKE 1 TABLET BY MOUTH 1 TIME DAILY, Disp: 90 tablet, Rfl: 2    valACYclovir (VALTREX) 1000 MG tablet, Take 1 tablet (1,000 mg total) by mouth 3 (three) times daily. for 7 days, Disp: 21 tablet, Rfl: 0    zolpidem (AMBIEN) 10 mg Tab, Take 1 tablet (10 mg total) by mouth nightly as needed., Disp: 10 tablet, Rfl: 2  Current medications Reviewed    Review of Systems   Constitutional: Negative for activity change, appetite change, fatigue and fever.   HENT: Negative for nosebleeds.    Respiratory: Negative for cough and shortness of breath.    Cardiovascular: Negative for chest pain, palpitations and leg swelling.   Gastrointestinal: Negative for abdominal distention, abdominal pain and nausea.   Genitourinary: Negative for frequency.   Musculoskeletal: Negative for arthralgias and myalgias.   Skin: Negative for rash.   Neurological: Negative for dizziness and numbness.   Hematological: Does not bruise/bleed easily.     Objective:   Physical Exam  Constitutional:       Appearance: He is well-developed.   HENT:      Head: Normocephalic and atraumatic.   Eyes:      Extraocular Movements: Extraocular movements intact.   Neck:      Musculoskeletal: Normal range of motion.   Cardiovascular:      Rate and Rhythm: Normal rate and regular rhythm.      Heart sounds: Murmur present.   Pulmonary:      Effort: Pulmonary effort is normal.      Breath sounds: Normal breath sounds.   Abdominal:      Palpations: Abdomen is soft.   Musculoskeletal: Normal range of motion.   Skin:     General: Skin is warm and dry.      Capillary Refill: Capillary refill takes less than 2 seconds.   Neurological:      Mental Status: He is alert and oriented to person, place, and time.         Diagnotic Results:  ECHO 8/4/2020  Low normal left ventricular systolic function. The estimated ejection fraction is 55%.   No wall  motion abnormalities.   Concentric left ventricular remodeling.   Normal LV diastolic function.   Severe left atrial enlargement.   Normal right ventricular systolic function.   Mild right ventricular enlargement.   Severe right atrial enlargement.   The aortic valve is bileaflet.   Moderate-to-severe aortic valve stenosis.   Aortic valve area is 1.07 cm2; peak velocity is 2.14 m/s; mean gradient is 10 mmHg.   Mild--moderate to at most moderate (2+) mitral regurgitation.   Mild tricuspid regurgitation.   Elevated central venous pressure (15 mmHg).   The estimated PA systolic pressure is 36 mmHg.   The ascending aorta is mildly dilated.    CT Chest 8/4/2020-Reviewed    All diagnotics and labs reviewed.    Assessment:   1. Bicuspid Aortic Valve  2. TAA  Plan:     CTS Attending Note:    I have personally taken the history and examined this patient and agree with the SUNNI's note as stated above.  Very pleasant 66-year-old gentleman with known bicuspid aortic valve.  He has very mild aortic stenosis, with a mean gradient of 10.  He has mild to moderate aortic insufficiency.  I reviewed both his echo and his CT scan.  His ascending aorta remains stable in size at roughly 4.5 cm at the level of the right pulmonary artery.  Given the mild aortic stenosis, I did not recommend surgical correction at this time.  He is quite active, and remains asymptomatic.  We will plan to repeat his echo and CT scan in 1 year.

## 2020-08-10 DIAGNOSIS — I49.8 OTHER SPECIFIED CARDIAC ARRHYTHMIAS: Primary | ICD-10-CM

## 2020-08-19 ENCOUNTER — LAB VISIT (OUTPATIENT)
Dept: LAB | Facility: HOSPITAL | Age: 70
End: 2020-08-19
Attending: INTERNAL MEDICINE
Payer: COMMERCIAL

## 2020-08-19 ENCOUNTER — ANTI-COAG VISIT (OUTPATIENT)
Dept: CARDIOLOGY | Facility: CLINIC | Age: 70
End: 2020-08-19
Payer: COMMERCIAL

## 2020-08-19 DIAGNOSIS — D64.9 ANEMIA, UNSPECIFIED TYPE: ICD-10-CM

## 2020-08-19 DIAGNOSIS — Z79.01 LONG TERM (CURRENT) USE OF ANTICOAGULANTS: ICD-10-CM

## 2020-08-19 DIAGNOSIS — Z00.00 ROUTINE PHYSICAL EXAMINATION: ICD-10-CM

## 2020-08-19 LAB
25(OH)D3+25(OH)D2 SERPL-MCNC: 35 NG/ML (ref 30–96)
BASOPHILS # BLD AUTO: 0.04 K/UL (ref 0–0.2)
BASOPHILS NFR BLD: 0.9 % (ref 0–1.9)
DIFFERENTIAL METHOD: ABNORMAL
EOSINOPHIL # BLD AUTO: 0.1 K/UL (ref 0–0.5)
EOSINOPHIL NFR BLD: 1.8 % (ref 0–8)
ERYTHROCYTE [DISTWIDTH] IN BLOOD BY AUTOMATED COUNT: 11.8 % (ref 11.5–14.5)
HCT VFR BLD AUTO: 40.2 % (ref 40–54)
HGB BLD-MCNC: 12.7 G/DL (ref 14–18)
IMM GRANULOCYTES # BLD AUTO: 0.02 K/UL (ref 0–0.04)
IMM GRANULOCYTES NFR BLD AUTO: 0.5 % (ref 0–0.5)
INR PPP: 2.8 (ref 0.8–1.2)
LYMPHOCYTES # BLD AUTO: 1.3 K/UL (ref 1–4.8)
LYMPHOCYTES NFR BLD: 28.7 % (ref 18–48)
MCH RBC QN AUTO: 32.4 PG (ref 27–31)
MCHC RBC AUTO-ENTMCNC: 31.6 G/DL (ref 32–36)
MCV RBC AUTO: 103 FL (ref 82–98)
MONOCYTES # BLD AUTO: 0.6 K/UL (ref 0.3–1)
MONOCYTES NFR BLD: 12.4 % (ref 4–15)
NEUTROPHILS # BLD AUTO: 2.5 K/UL (ref 1.8–7.7)
NEUTROPHILS NFR BLD: 55.7 % (ref 38–73)
NRBC BLD-RTO: 0 /100 WBC
PLATELET # BLD AUTO: 219 K/UL (ref 150–350)
PMV BLD AUTO: 11.4 FL (ref 9.2–12.9)
PROTHROMBIN TIME: 29.9 SEC (ref 9–12.5)
RBC # BLD AUTO: 3.92 M/UL (ref 4.6–6.2)
WBC # BLD AUTO: 4.42 K/UL (ref 3.9–12.7)

## 2020-08-19 PROCEDURE — 93793 ANTICOAG MGMT PT WARFARIN: CPT | Mod: S$GLB,,,

## 2020-08-19 PROCEDURE — 85610 PROTHROMBIN TIME: CPT

## 2020-08-19 PROCEDURE — 85025 COMPLETE CBC W/AUTO DIFF WBC: CPT

## 2020-08-19 PROCEDURE — 93793 PR ANTICOAGULANT MGMT FOR PT TAKING WARFARIN: ICD-10-PCS | Mod: S$GLB,,,

## 2020-08-19 PROCEDURE — 82306 VITAMIN D 25 HYDROXY: CPT

## 2020-08-19 PROCEDURE — 36415 COLL VENOUS BLD VENIPUNCTURE: CPT

## 2020-09-15 ENCOUNTER — PATIENT OUTREACH (OUTPATIENT)
Dept: ADMINISTRATIVE | Facility: OTHER | Age: 70
End: 2020-09-15

## 2020-09-16 ENCOUNTER — OFFICE VISIT (OUTPATIENT)
Dept: SPORTS MEDICINE | Facility: CLINIC | Age: 70
End: 2020-09-16
Payer: COMMERCIAL

## 2020-09-16 ENCOUNTER — HOSPITAL ENCOUNTER (OUTPATIENT)
Dept: RADIOLOGY | Facility: HOSPITAL | Age: 70
Discharge: HOME | End: 2020-09-16
Attending: ORTHOPAEDIC SURGERY
Payer: COMMERCIAL

## 2020-09-16 VITALS
HEART RATE: 86 BPM | WEIGHT: 190 LBS | SYSTOLIC BLOOD PRESSURE: 112 MMHG | BODY MASS INDEX: 25.73 KG/M2 | DIASTOLIC BLOOD PRESSURE: 75 MMHG | HEIGHT: 72 IN

## 2020-09-16 DIAGNOSIS — M19.019 ARTHRITIS OF SHOULDER: ICD-10-CM

## 2020-09-16 DIAGNOSIS — M25.511 RIGHT SHOULDER PAIN, UNSPECIFIED CHRONICITY: Primary | ICD-10-CM

## 2020-09-16 DIAGNOSIS — M25.511 RIGHT SHOULDER PAIN, UNSPECIFIED CHRONICITY: ICD-10-CM

## 2020-09-16 PROCEDURE — 99999 PR PBB SHADOW E&M-EST. PATIENT-LVL IV: ICD-10-PCS | Mod: PBBFAC,,, | Performed by: ORTHOPAEDIC SURGERY

## 2020-09-16 PROCEDURE — 3008F PR BODY MASS INDEX (BMI) DOCUMENTED: ICD-10-PCS | Mod: CPTII,S$GLB,, | Performed by: ORTHOPAEDIC SURGERY

## 2020-09-16 PROCEDURE — 1159F PR MEDICATION LIST DOCUMENTED IN MEDICAL RECORD: ICD-10-PCS | Mod: S$GLB,,, | Performed by: ORTHOPAEDIC SURGERY

## 2020-09-16 PROCEDURE — 99204 OFFICE O/P NEW MOD 45 MIN: CPT | Mod: S$GLB,,, | Performed by: ORTHOPAEDIC SURGERY

## 2020-09-16 PROCEDURE — 99204 PR OFFICE/OUTPT VISIT, NEW, LEVL IV, 45-59 MIN: ICD-10-PCS | Mod: S$GLB,,, | Performed by: ORTHOPAEDIC SURGERY

## 2020-09-16 PROCEDURE — 3008F BODY MASS INDEX DOCD: CPT | Mod: CPTII,S$GLB,, | Performed by: ORTHOPAEDIC SURGERY

## 2020-09-16 PROCEDURE — 1159F MED LIST DOCD IN RCRD: CPT | Mod: S$GLB,,, | Performed by: ORTHOPAEDIC SURGERY

## 2020-09-16 PROCEDURE — 1125F PR PAIN SEVERITY QUANTIFIED, PAIN PRESENT: ICD-10-PCS | Mod: S$GLB,,, | Performed by: ORTHOPAEDIC SURGERY

## 2020-09-16 PROCEDURE — 1125F AMNT PAIN NOTED PAIN PRSNT: CPT | Mod: S$GLB,,, | Performed by: ORTHOPAEDIC SURGERY

## 2020-09-16 PROCEDURE — 1101F PR PT FALLS ASSESS DOC 0-1 FALLS W/OUT INJ PAST YR: ICD-10-PCS | Mod: CPTII,S$GLB,, | Performed by: ORTHOPAEDIC SURGERY

## 2020-09-16 PROCEDURE — 99999 PR PBB SHADOW E&M-EST. PATIENT-LVL IV: CPT | Mod: PBBFAC,,, | Performed by: ORTHOPAEDIC SURGERY

## 2020-09-16 PROCEDURE — 1101F PT FALLS ASSESS-DOCD LE1/YR: CPT | Mod: CPTII,S$GLB,, | Performed by: ORTHOPAEDIC SURGERY

## 2020-09-16 NOTE — PROGRESS NOTES
Subjective:          Chief Complaint: Navjot Fine is a 70 y.o. 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 It has gotten significantly worse the past 2 years. Pain is located over (points to) the whole shoulder. He reports that the pain is a 0 /10 aching pain today and not responding adequately to conservative measures which have included activity modifications, rest, and oral medication. Is affecting ADLs and limiting desired level of activity. Denies numbness, tingling, radiation, and neck pain or radicular symptoms.  Pain is 5 /10 at its worst    Mechanical symptoms:  Subjective instability: (--)   Worse with movement and activity  Better with rest.   Nocturnal symptoms: (--)    No previous surgeries on the right shoulder. He does tell me he sustained a hard hit to the right shoulder when playing football 40 years ago, but he did not dislocate or fracture.    He had a steroid injection into the right shoulder in July at [facility]. The injection did not help at all. He has done 6 weeks of therapy, which helped some. He has tried NSAIDs as well.    He has a history of A-fib and is on Coumadin. He also has a history of a right & left MATIAS (done in [place]).    Review of Systems   Constitution: Negative.   HENT: Negative.    Eyes: Negative.    Cardiovascular: Negative.    Respiratory: Negative.    Endocrine: Negative.    Hematologic/Lymphatic: Negative.    Skin: Negative.    Musculoskeletal: Positive for joint pain.   Gastrointestinal: Negative.    Genitourinary: Negative.    Neurological: Negative.    Psychiatric/Behavioral: Negative.    Allergic/Immunologic: Negative.    All other systems reviewed and are negative.      Pain Related Questions  Over the past 3 days, what was your average pain during activity? (I.e. running, jogging, walking, climbing stairs, getting dressed, ect.): 2  Over  the past 3 days, what was your highest pain level?: 4  Over the past 3 days, what was your lowest pain level? : 1    Other  Was the patient's HEIGHT measured or patient reported?: Patient Reported  Was the patient's WEIGHT measured or patient reported?: Measured      Objective:        General: Navjot is well-developed, well-nourished, appears stated age, in no acute distress, alert and oriented to time, place and person.     General    Vitals reviewed.  Constitutional: He is oriented to person, place, and time. He appears well-developed and well-nourished. No distress.   HENT:   Nose: Nose normal.   Mouth/Throat: No oropharyngeal exudate.   Eyes: Pupils are equal, round, and reactive to light. Right eye exhibits no discharge. Left eye exhibits no discharge.   Neck: Normal range of motion.   Cardiovascular: Normal rate and intact distal pulses.    Pulmonary/Chest: Effort normal and breath sounds normal. No respiratory distress.   Neurological: He is alert and oriented to person, place, and time. He has normal reflexes. He displays normal reflexes. No cranial nerve deficit. Coordination normal.   Psychiatric: He has a normal mood and affect. His behavior is normal. Judgment and thought content normal.         Right Shoulder Exam     Inspection/Observation   Swelling: absent  Bruising: absent  Scars: absent  Deformity: absent  Scapular Winging: absent  Scapular Dyskinesia: negative  Atrophy: absent    Tenderness   The patient is experiencing no tenderness.    Range of Motion   Active abduction:  90 normal   Passive abduction:  100 normal   Extension:  0 normal   Forward Flexion:  90 normal   Forward Elevation: 180 normal  Adduction: 40 normal  External Rotation 0 degrees:  60 normal   External Rotation 90 degrees: 70 normal  Internal rotation 0 degrees:  L3 normal   Internal rotation 90 degrees:  30 normal     Tests & Signs   Apprehension: negative  Cross arm: negative  Drop arm: negative  Trejo test:  negative  Impingement: negative  Sulcus: absent  Rotator Cuff Painful Arc/Range: mild  Anterosuperior Escape: negative  Lag Sign 0 degrees: negative  Lag Sign 90 degrees: negative  Lift Off Sign: negative  Belly Press: negative  Active Compression Test (Arcanum's Sign): negative  Yergason's Test: negative  Speed's Test: negative  Anterior Drawer Test: 1+   Posterior Drawer Test: 1+  Relocation 90 degrees: negative  Relocation > 90 degrees: negative  Bear Hug: negative  Moving Valgus: negative  Jerk Test: negative    Other   Sensation: normal    Comments:  Crepitus glenohumeral joint. Pain with extremes of movement of the right shoulder. Decreased active overhead motion.    Left Shoulder Exam     Inspection/Observation   Swelling: absent  Bruising: absent  Scars: absent  Deformity: absent  Scapular Winging: absent  Scapular Dyskinesia: negative  Atrophy: absent    Tenderness   The patient is experiencing no tenderness.     Range of Motion   Active abduction:  90 normal   Passive abduction:  100 normal   Extension:  0 normal   Forward Flexion:  160 normal   Forward Elevation: 180 normal  Adduction: 40 normal  External Rotation 0 degrees:  60 normal   External Rotation 90 degrees: 80 normal  Internal rotation 0 degrees:  T12 normal   Internal rotation 90 degrees:  30 normal     Tests & Signs   Apprehension: negative  Cross arm: negative  Drop arm: negative  Trejo test: negative  Impingement: negative  Sulcus: absent  Anterosuperior Escape: negative  Lag Sign 0 degrees: negative  Lag Sign 90 degrees: negative  Lift Off Sign: negative  Belly Press: negative  Active Compression test (Arcanum's Sign): negative  Yergasons's Test: negative  Speed's Test: negative  Anterior Drawer Test: 1+  Posterior Drawer Test: 1+  Relocation 90 degrees: negative  Relocation > 90 degrees: negative  Bear Hug: negative  Moving Valgus: negative  Jerk Test: negative    Other   Sensation: normal       Muscle Strength   Right Upper Extremity    Shoulder Abduction: 5/5   Shoulder Internal Rotation: 5/5   Shoulder External Rotation: 5/5   Supraspinatus: 5/5   Subscapularis: 5/5   Biceps: 5/5   Left Upper Extremity  Shoulder Abduction: 5/5   Shoulder Internal Rotation: 5/5   Shoulder External Rotation: 5/5   Supraspinatus: 5/5   Subscapularis: 5/5   Biceps: 5/5     Reflexes     Left Side  Biceps:  2+  Triceps:  2+  Brachioradialis:  2+    Right Side   Biceps:  2+  Triceps:  2+  Brachioradialis:  2+    Vascular Exam     Right Pulses      Radial:                    2+      Left Pulses      Radial:                    2+      Capillary Refill  Right Hand: normal capillary refill  Left Hand: normal capillary refill      XR Right Shoulder (7-6-20) [facility] at [facility]:  -Severe glenohumeral joint degeneration  -Moderate AC joint degeneration  -Scattered mineral densities may reflect a combination of tendon and labral calcification and loose bodies        Assessment:       Encounter Diagnoses   Name Primary?    Right shoulder pain, unspecified chronicity Yes    Arthritis of shoulder           Plan:         1. ASES and SF-12 was filled out today in clinic.     Virtual visit with Dr. Elias Saeed for imaging follow-up. Patient will not fill out ASES, SF-12 on return.    2. We reviewed with Navjot today, the pathology and natural history of his diagnosis. We have discussed a variety of treatment options including medications, physical therapy and other alternative treatments. I also explained the indications, risks and benefits of surgery. After discussion, Navjot will decide on the following surgery once advanced imaging has been obtained:    1. Right total shoulder arthroplasty  2. Right biceps tenodesis    The details of the surgical procedure were explained, including the location of probable incisions and a description of likely hardware and/or grafts to be used.  The patient understands the likely convalescence after surgery.  Also, we have thoroughly discussed  the risks, benefits and alternatives to surgery, including, but not limited to, the risk of infection, joint stiffness, blood clot (including DVT and/or pulmonary embolus), neurologic and vascular injury.  It was explained that, if tissue has been repaired or reconstructed, there is a chance of failure, which may require further management.    He does need pre-op clearance by his PCP Dr. Beth & his cardiologist.    All of the patient's questions were answered and informed consent was obtained. The patient will contact us if they have any questions or concerns in the interim.      3. CT right shoulder to evaluate bony anatomy and glenoid bone stock    4. MRI right shoulder to evaluate rotator cuff                      [facility] patient questionnaires have been collected today.

## 2020-09-16 NOTE — PATIENT INSTRUCTIONS
Understanding Glenohumeral Osteoarthritis    A joint is a place where two bones meet. The glenohumeral joint is the main joint in the shoulder. It is a ball-and-socket joint. It is formed where the head or ball of the upper arm bone fits into the shallow socket on the shoulder blade. The upper arm bone is called the humerus. The socket is called the glenoid. This is how the joint gets its name. When the glenohumeral joint is healthy, it helps you rotate and move your arm freely without any pain.  With glenohumeral osteoarthritis, the parts of the joint wear down. This can cause pain, stiffness, and limited movement. Glenohumeral osteoarthritis is a long-term condition. But treatment can help manage symptoms, increase movement, and improve function.  How glenohumeral osteoarthritis occurs  All joints contain a smooth tissue called cartilage. Cartilage cushions the ends of bones. This helps them glide smoothly against each other. Glenohumeral osteoarthritis occurs when cartilage in the glenohumeral joint begins to break down. The ball and socket bones may then become exposed and rub together. The bones may become rough and pitted and may begin to wear away. This prevents smooth movement of the joint.  Causes of glenohumeral osteoarthritis  In most cases, the condition develops because of damage from a shoulder injury, such as a dislocated or broken shoulder. Normal wear and tear of the joint from aging can also cause osteoarthritis.  Symptoms of glenohumeral osteoarthritis  Symptoms tend to develop slowly over months to years. Shoulder pain is common. The pain is often worse with activity, and may get better with rest. Over time, the pain may worsen, and may even occur at night.  Stiffness in the shoulder is also common. It may be hard to move or use the arm and shoulder as you normally would. This can make even simple tasks difficult, such as reaching for an item on a shelf or getting dressed.  Treating  glenohumeral osteoarthritis  Treatment may include:  · Resting the shoulder. This involves limiting certain movements, such as reaching, lifting, pushing, or pulling. These can cause further wear and tear of the joint and make symptoms worse.  · Cold or heat packs. Cold packs can help reduce pain and swelling. Heat packs do not help with swelling. But they may help relieve pain and stiffness, especially before physical therapy or activity.  · Pain medicines. These help relieve pain and swelling. Medicines may be prescribed or bought over the counter.  · Injections of medicine into the joint. These help relieve symptoms for a time.  · Physical therapy and exercises.  These help improve strength and range of motion in the joint. This may reduce shoulder stiffness and improve function.  · Certain assistive devices. These are tools that can be used to make activities of daily life easier. For instance, a grasper can help you reach and grab items.  · Alternative treatments. These include options, such as acupuncture or massage. They may help relieve pain and stiffness in some cases.  If other treatments dont do enough to relieve symptoms, you may need surgery. During surgery, the damaged joint is removed. It is then replaced with an artificial joint. This can help relieve symptoms and improve function to near normal.     When to call your healthcare provider  Call your healthcare provider right away if you have any of these:  · Fever of 100.4°F (38°C) or higher, or as directed  · Symptoms that dont get better with treatment, or get worse  · New symptoms   Date Last Reviewed: 3/10/2016  © 6293-1340 [employer] [address] [place] [address], [place] [address]. All rights reserved. This information is not intended as a substitute for professional medical care. Always follow your healthcare professional's instructions.        Total Shoulder Replacement Surgery  During shoulder replacement surgery, all or part of  your problem shoulder is replaced with an artificial joint, called a prosthesis. The prosthesis replaces the rough, worn parts of your shoulder with smooth metal and plastic parts.    Before your surgery  You will most likely arrive at the hospital on the morning of the surgery. Be sure to follow all of your doctors instructions on preparing for surgery.  · Follow any directions you are given for taking medicines or for not eating or drinking before surgery.  · At the hospital, your temperature, pulse, breathing, and blood pressure will be checked.  · An IV (intravenous) line will be started to provide fluids and medications needed during surgery.  The surgical procedure  When the surgical team is ready, youll be taken to the operating room. There youll be given anesthesia to help you sleep through surgery. Your surgeon may replace just the ball (partial replacement) or both the ball and the socket (total replacement). An incision about six inches long is made from your collarbone to your arm. Once the new joint is in place, your surgeon closes the incision with surgical staples or sutures (stitches).  After your surgery  After surgery, youll be sent to the PACU (postanesthesia care unit). When you are fully awake, youll be moved to your room. The nurses will give you medications to ease your pain. Soon, health care providers will help you get up and moving. You may also have physical therapy after surgery.  You may need to wear an arm sling for a few weeks.  When to call your doctor  Once at home, call your doctor if you have any of the symptoms below:  · An increase in pain not relieved by your pain medicine  · Unusual redness, heat, or drainage at the incision site  · Fever over 100.4°F (38°C)   Date Last Reviewed: 9/10/2015  © 2710-8652 [employer]. [address] [place] [address], [place] [address]. All rights reserved. This information is not intended as a substitute for professional medical care.  Always follow your healthcare professional's instructions.

## 2020-09-23 ENCOUNTER — HOSPITAL ENCOUNTER (OUTPATIENT)
Dept: RADIOLOGY | Facility: HOSPITAL | Age: 70
Discharge: HOME | End: 2020-09-23
Attending: ORTHOPAEDIC SURGERY
Payer: COMMERCIAL

## 2020-09-23 ENCOUNTER — PATIENT MESSAGE (OUTPATIENT)
Dept: SPORTS MEDICINE | Facility: CLINIC | Age: 70
End: 2020-09-23

## 2020-09-23 DIAGNOSIS — M19.019 ARTHRITIS OF SHOULDER: ICD-10-CM

## 2020-09-23 PROCEDURE — 73221 MRI SHOULDER WITHOUT CONTRAST RIGHT: ICD-10-PCS | Mod: 26,RT,, | Performed by: RADIOLOGY

## 2020-09-23 PROCEDURE — 73221 MRI JOINT UPR EXTREM W/O DYE: CPT | Mod: 26,RT,, | Performed by: RADIOLOGY

## 2020-09-23 PROCEDURE — 76377 3D RENDER W/INTRP POSTPROCES: CPT | Mod: 26,,, | Performed by: RADIOLOGY

## 2020-09-23 PROCEDURE — 73200 CT SHOULDER WITHOUT CONTRAST RIGHT: ICD-10-PCS | Mod: 26,RT,, | Performed by: RADIOLOGY

## 2020-09-23 PROCEDURE — 73221 MRI JOINT UPR EXTREM W/O DYE: CPT | Mod: TC,RT

## 2020-09-23 PROCEDURE — 73200 CT UPPER EXTREMITY W/O DYE: CPT | Mod: TC,RT

## 2020-09-23 PROCEDURE — 76377 CT 3D RECON WITH INDEPENDENT WS: ICD-10-PCS | Mod: 26,,, | Performed by: RADIOLOGY

## 2020-09-23 PROCEDURE — 73200 CT UPPER EXTREMITY W/O DYE: CPT | Mod: 26,RT,, | Performed by: RADIOLOGY

## 2020-09-23 PROCEDURE — 76377 3D RENDER W/INTRP POSTPROCES: CPT | Mod: TC

## 2020-09-24 DIAGNOSIS — G89.29 CHRONIC RIGHT SHOULDER PAIN: Primary | ICD-10-CM

## 2020-09-24 DIAGNOSIS — M19.019 ARTHRITIS OF SHOULDER: ICD-10-CM

## 2020-09-24 DIAGNOSIS — M25.511 CHRONIC RIGHT SHOULDER PAIN: Primary | ICD-10-CM

## 2020-09-25 ENCOUNTER — OFFICE VISIT (OUTPATIENT)
Dept: SPORTS MEDICINE | Facility: CLINIC | Age: 70
End: 2020-09-25
Payer: COMMERCIAL

## 2020-09-25 DIAGNOSIS — M19.019 ARTHRITIS OF SHOULDER: ICD-10-CM

## 2020-09-25 DIAGNOSIS — M25.511 CHRONIC RIGHT SHOULDER PAIN: Primary | ICD-10-CM

## 2020-09-25 DIAGNOSIS — G89.29 CHRONIC RIGHT SHOULDER PAIN: Primary | ICD-10-CM

## 2020-09-25 DIAGNOSIS — M19.011 PRIMARY OSTEOARTHRITIS OF RIGHT SHOULDER: ICD-10-CM

## 2020-09-25 DIAGNOSIS — M75.111 PARTIAL NONTRAUMATIC RUPTURE OF RIGHT ROTATOR CUFF: ICD-10-CM

## 2020-09-25 DIAGNOSIS — M75.21 BICEPS TENDINITIS OF RIGHT SHOULDER: ICD-10-CM

## 2020-09-25 PROCEDURE — 1101F PR PT FALLS ASSESS DOC 0-1 FALLS W/OUT INJ PAST YR: ICD-10-PCS | Mod: CPTII,,, | Performed by: ORTHOPAEDIC SURGERY

## 2020-09-25 PROCEDURE — 1159F MED LIST DOCD IN RCRD: CPT | Mod: ,,, | Performed by: ORTHOPAEDIC SURGERY

## 2020-09-25 PROCEDURE — 99214 PR OFFICE/OUTPT VISIT, EST, LEVL IV, 30-39 MIN: ICD-10-PCS | Mod: 95,,, | Performed by: ORTHOPAEDIC SURGERY

## 2020-09-25 PROCEDURE — 1159F PR MEDICATION LIST DOCUMENTED IN MEDICAL RECORD: ICD-10-PCS | Mod: ,,, | Performed by: ORTHOPAEDIC SURGERY

## 2020-09-25 PROCEDURE — 1101F PT FALLS ASSESS-DOCD LE1/YR: CPT | Mod: CPTII,,, | Performed by: ORTHOPAEDIC SURGERY

## 2020-09-25 PROCEDURE — 99214 OFFICE O/P EST MOD 30 MIN: CPT | Mod: 95,,, | Performed by: ORTHOPAEDIC SURGERY

## 2020-09-25 NOTE — PROGRESS NOTES
Subjective:          Chief Complaint: Navjot Fine is a 70 y.o. 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 It has gotten significantly worse the past 2 years. Pain is located over (points to) the whole shoulder. He reports that the pain is a 0 /10 aching pain today and not responding adequately to conservative measures which have included activity modifications, rest, and oral medication. Is affecting ADLs and limiting desired level of activity. Denies numbness, tingling, radiation, and neck pain or radicular symptoms.  Pain is 5 /10 at its worst    Mechanical symptoms:  Subjective instability: (--)   Worse with movement and activity  Better with rest.   Nocturnal symptoms: (--)    No previous surgeries on the right shoulder. He does tell me he sustained a hard hit to the right shoulder when playing football 40 years ago, but he did not dislocate or fracture.    He had a steroid injection into the right shoulder in July at [employer]. The injection did not help at all. He has done 6 weeks of therapy, which helped some. He has tried NSAIDs as well.    He has a history of A-fib and is on Coumadin. He also has a history of a right & left MATIAS (done in [place]).    Review of Systems   Constitution: Negative.   HENT: Negative.    Eyes: Negative.    Cardiovascular: Negative.    Respiratory: Negative.    Endocrine: Negative.    Hematologic/Lymphatic: Negative.    Skin: Negative.    Musculoskeletal: Positive for joint pain.   Gastrointestinal: Negative.    Genitourinary: Negative.    Neurological: Negative.    Psychiatric/Behavioral: Negative.    Allergic/Immunologic: Negative.    All other systems reviewed and are negative.    Telemedicine/Virtual Visit Documentation:     The patient location is: home    The chief complaint leading to consultation is: see HPI    VISIT TYPE X   Virtual visit with  synchronous audio and video    Telephone E/M service      Total time spent with patient: see X abhishek on chart below.   More than half of the time was spent counseling or coordinating care including prognosis, differential diagnosis, risks and benefits of treatment, instructions, compliance risk reductions     EST MINUTES X   28590 5    54309 10    65380 15    67074 25    34851 40    NEW     80327 10    90525 20    24674 30    34456 45    51479 60    PHONE      5-10    25586 11-20    03606 21-30      H&P  Orthopaedics      SUBJECTIVE:     History of Present Illness:  Patient is a 70 y.o. male with continued pain at the right shoulder.    Review of patient's allergies indicates:   Allergen Reactions    No known allergies        Past Medical History:   Diagnosis Date    Atrial fibrillation     Cholelithiasis      No past surgical history on file.  No family history on file.  Social History     Tobacco Use    Smoking status: Never Smoker    Smokeless tobacco: Never Used   Substance Use Topics    Alcohol use: Yes     Comment: [facility].    Drug use: Not on file        Review of Systems:  Patient denies constitutional symptoms, cardiac symptoms, respiratory symptoms, GI symptoms.  The remainder of the musculoskeletal ROS is included in the HPI.      OBJECTIVE:     Physical Exam:  Gen:  No acute distress  CV:  Peripherally well-perfused.  Pulses 2+ bilaterally.  Lungs:  Normal respiratory effort.  Abdomen:  Soft, non-tender, non-distended  Head/Neck:  Normocephalic.  Atraumatic. No TTP, AROM and PROM intact without pain  Neuro:  CN intact without deficit, SILT throughout B/L Upper & Lower Extremities    MSK:  See attached    No imaging was obtained for this visit as patient was unable to complete an in clinic visit.    ASSESSMENT/PLAN:     A/P: Navjot Fine is a 70 y.o.  Proceed with surgery    Plan:    1. ASES and SF-12 was filled out today in clinic.     Follow-up with Dr. Elias Saeed for Preop H&P; will not  fill out ASES, SF-12 on return.    2. We reviewed with Navjot today, the pathology and natural history of his diagnosis. We have discussed a variety of treatment options including medications, physical therapy and other alternative treatments. I also explained the indications, risks and benefits of surgery. After discussion, Navjot will decide on the following surgery once advanced imaging has been obtained:    Right shoulder:  1. Right Arthrosurface TSR (40 mm humeral head / Large glenoid component)   with large inlay glenoid vs. Fascia lou  2. Right glenoid resurfacing graft (Fascia lou) - [place] technique  3. Right open biceps tenodesis  4. Right arthroscopic rotator cuff debridement  5. Right arthroscopic labral debridement     Postop Immediate  Protect subscapularis  ER side - 30 degrees  F.Flex. - 90 degrees  Pendulum  Codman's     Call and set up date for surgical procedure  Preop. Clearance - Cardiologist  Hold Coumadin 5 days prior to surgery  Surgery at [facility]    PT prehabilitation - [facility]    The details of the surgical procedure were explained, including the location of probable incisions and a description of likely hardware and/or grafts to be used.  The patient understands the likely convalescence after surgery.  Also, we have thoroughly discussed the risks, benefits and alternatives to surgery, including, but not limited to, the risk of infection, joint stiffness, blood clot (including DVT and/or pulmonary embolus), neurologic and vascular injury.  It was explained that, if tissue has been repaired or reconstructed, there is a chance of failure, which may require further management.    He does need pre-op clearance by his PCP Dr. Beth & his cardiologist.    All of the patient's questions were answered and informed consent was obtained. The patient will contact us if they have any questions or concerns in the interim.      3. 30 minute conversation                   Objective:        General: Navjot is well-developed, well-nourished, appears stated age, in no acute distress, alert and oriented to time, place and person.     General    Vitals reviewed.  Constitutional: He is oriented to person, place, and time. He appears well-developed and well-nourished. No distress.   HENT:   Nose: Nose normal.   Mouth/Throat: No oropharyngeal exudate.   Eyes: Pupils are equal, round, and reactive to light. Right eye exhibits no discharge. Left eye exhibits no discharge.   Neck: Normal range of motion.   Cardiovascular: Normal rate and intact distal pulses.    Pulmonary/Chest: Effort normal and breath sounds normal. No respiratory distress.   Neurological: He is alert and oriented to person, place, and time. He has normal reflexes. He displays normal reflexes. No cranial nerve deficit. Coordination normal.   Psychiatric: He has a normal mood and affect. His behavior is normal. Judgment and thought content normal.         Right Shoulder Exam     Inspection/Observation   Swelling: absent  Bruising: absent  Scars: absent  Deformity: absent  Scapular Winging: absent  Scapular Dyskinesia: negative  Atrophy: absent    Tenderness   The patient is tender to palpation of the greater tuberosity and biceps tendon.    Range of Motion   Active abduction:  90 normal   Passive abduction:  100 normal   Extension:  0 normal   Forward Flexion:  90 normal   Forward Elevation: 180 normal  Adduction: 40 normal  External Rotation 0 degrees:  60 normal   External Rotation 90 degrees: 70 normal  Internal rotation 0 degrees:  L3 normal   Internal rotation 90 degrees:  30 normal     Tests & Signs   Apprehension: negative  Cross arm: negative  Drop arm: negative  Trejo test: negative  Impingement: negative  Sulcus: absent  Rotator Cuff Painful Arc/Range: mild  Anterosuperior Escape: negative  Lag Sign 0 degrees: negative  Lag Sign 90 degrees: negative  Lift Off Sign: negative  Belly Press: negative  Active  Compression Test ([place]'s Sign): negative  Yergason's Test: negative  Speed's Test: negative  Anterior Drawer Test: 1+   Posterior Drawer Test: 1+  Relocation 90 degrees: negative  Relocation > 90 degrees: negative  Bear Hug: negative  Moving Valgus: negative  Jerk Test: negative    Other   Sensation: normal    Comments:  Crepitus glenohumeral joint. Pain with extremes of movement of the right shoulder. Decreased active overhead motion.    Left Shoulder Exam     Inspection/Observation   Swelling: absent  Bruising: absent  Scars: absent  Deformity: absent  Scapular Winging: absent  Scapular Dyskinesia: negative  Atrophy: absent    Tenderness   The patient is experiencing no tenderness.     Range of Motion   Active abduction:  90 normal   Passive abduction:  100 normal   Extension:  0 normal   Forward Flexion:  160 normal   Forward Elevation: 180 normal  Adduction: 40 normal  External Rotation 0 degrees:  60 normal   External Rotation 90 degrees: 80 normal  Internal rotation 0 degrees:  T12 normal   Internal rotation 90 degrees:  30 normal     Tests & Signs   Apprehension: negative  Cross arm: negative  Drop arm: negative  Trejo test: negative  Impingement: negative  Sulcus: absent  Anterosuperior Escape: negative  Lag Sign 0 degrees: negative  Lag Sign 90 degrees: negative  Lift Off Sign: negative  Belly Press: negative  Active Compression test ([place]'s Sign): negative  Yergasons's Test: negative  Speed's Test: negative  Anterior Drawer Test: 1+  Posterior Drawer Test: 1+  Relocation 90 degrees: negative  Relocation > 90 degrees: negative  Bear Hug: negative  Moving Valgus: negative  Jerk Test: negative    Other   Sensation: normal       Muscle Strength   Right Upper Extremity   Shoulder Abduction: 5/5   Shoulder Internal Rotation: 5/5   Shoulder External Rotation: 5/5   Supraspinatus: 5/5   Subscapularis: 5/5   Biceps: 5/5   Left Upper Extremity  Shoulder Abduction: 5/5   Shoulder Internal Rotation: 5/5    Shoulder External Rotation: 5/5   Supraspinatus: 5/5   Subscapularis: 5/5   Biceps: 5/5     Reflexes     Left Side  Biceps:  2+  Triceps:  2+  Brachioradialis:  2+    Right Side   Biceps:  2+  Triceps:  2+  Brachioradialis:  2+    Vascular Exam     Right Pulses      Radial:                    2+      Left Pulses      Radial:                    2+      Capillary Refill  Right Hand: normal capillary refill  Left Hand: normal capillary refill      XR Right Shoulder (7-6-20) [facility] at [facility]:  -Severe glenohumeral joint degeneration  -Moderate AC joint degeneration  -Scattered mineral densities may reflect a combination of tendon and labral calcification and loose bodies        Assessment:       Encounter Diagnoses   Name Primary?    Chronic right shoulder pain Yes    Arthritis of shoulder     Primary osteoarthritis of right shoulder     Biceps tendinitis of right shoulder     Partial nontraumatic rupture of right rotator cuff           Plan:         1. ASES and SF-12 was filled out today in clinic.     Follow-up with Dr. Elias Saeed for Preop H&P; will not fill out ASES, SF-12 on return.    2. We reviewed with Navjot today, the pathology and natural history of his diagnosis. We have discussed a variety of treatment options including medications, physical therapy and other alternative treatments. I also explained the indications, risks and benefits of surgery. After discussion, Navjot will decide on the following surgery once advanced imaging has been obtained:    Right shoulder:  1. Right Arthrosurface TSR (40 mm humeral head / Large glenoid component)   with large inlay glenoid vs. Fascia lou  2. Right glenoid resurfacing graft (Fascia lou) - [place] technique  3. Right open biceps tenodesis  4. Right arthroscopic rotator cuff debridement  5. Right arthroscopic labral debridement     Postop Immediate  Protect subscapularis  ER side - 30 degrees  F.Flex. - 90 degrees  Pendulum  Codman's     Call and set up  date for surgical procedure  Preop. Clearance - Cardiologist  Hold Coumadin 5 days prior to surgery  Surgery at [facility]    PT prehabilitation - [facility]    The details of the surgical procedure were explained, including the location of probable incisions and a description of likely hardware and/or grafts to be used.  The patient understands the likely convalescence after surgery.  Also, we have thoroughly discussed the risks, benefits and alternatives to surgery, including, but not limited to, the risk of infection, joint stiffness, blood clot (including DVT and/or pulmonary embolus), neurologic and vascular injury.  It was explained that, if tissue has been repaired or reconstructed, there is a chance of failure, which may require further management.    He does need pre-op clearance by his PCP Dr. Beth & his cardiologist.    All of the patient's questions were answered and informed consent was obtained. The patient will contact us if they have any questions or concerns in the interim.      3. 30 minute conversation                      [facility] patient questionnaires have been collected today.

## 2020-09-25 NOTE — Clinical Note
Right shoulder:  1. Right Arthrosurface TSR (40 mm humeral head / Large glenoid component)   with large inlay glenoid vs. Fascia lou  2. Right glenoid resurfacing graft (Fascia lou) - [place] technique  3. Right open biceps tenodesis  4. Right arthroscopic rotator cuff debridement  5. Right arthroscopic labral debridement     Postop Immediate  Protect subscapularis  ER side - 30 degrees  F.Flex. - 90 degrees  Pendulum  Codman's     Call and set up date for surgical procedure  Preop. Clearance - Cardiologist  Hold Coumadin 5 days prior to surgery  Surgery at [facility]    PT prehabilitation - [facility]

## 2020-09-28 ENCOUNTER — LAB VISIT (OUTPATIENT)
Dept: LAB | Facility: HOSPITAL | Age: 70
End: 2020-09-28
Attending: INTERNAL MEDICINE
Payer: COMMERCIAL

## 2020-09-28 ENCOUNTER — ANTI-COAG VISIT (OUTPATIENT)
Dept: CARDIOLOGY | Facility: CLINIC | Age: 70
End: 2020-09-28
Payer: COMMERCIAL

## 2020-09-28 DIAGNOSIS — Z79.01 LONG TERM (CURRENT) USE OF ANTICOAGULANTS: ICD-10-CM

## 2020-09-28 LAB
INR PPP: 4.1 (ref 0.8–1.2)
PROTHROMBIN TIME: 42.4 SEC (ref 9–12.5)

## 2020-09-28 PROCEDURE — 85610 PROTHROMBIN TIME: CPT

## 2020-09-28 PROCEDURE — 93793 ANTICOAG MGMT PT WARFARIN: CPT | Mod: S$GLB,,,

## 2020-09-28 PROCEDURE — 36415 COLL VENOUS BLD VENIPUNCTURE: CPT

## 2020-09-28 PROCEDURE — 93793 PR ANTICOAGULANT MGMT FOR PT TAKING WARFARIN: ICD-10-PCS | Mod: S$GLB,,,

## 2020-10-08 ENCOUNTER — PATIENT MESSAGE (OUTPATIENT)
Dept: SPORTS MEDICINE | Facility: CLINIC | Age: 70
End: 2020-10-08

## 2020-10-21 ENCOUNTER — PATIENT MESSAGE (OUTPATIENT)
Dept: SPORTS MEDICINE | Facility: CLINIC | Age: 70
End: 2020-10-21

## 2020-11-02 NOTE — PROGRESS NOTES
Subjective:    Patient ID:  Navjot Fine is a 70 y.o. male who presents for follow-up of Atrial Fibrillation      HPI 69 yo male with h/o atrial fibrillation, bicuspid aortic valve, ascending aortic root enlargement.     Background:     Has had longstanding chronic AF, for which a rate control strategy has been employed.  Has always been very active, (>22413 steps per day).  Echo 7/13/18 EF 60-65% Ascending aorta 4.5 cm.  Follows with Dr. Tadeo for his Ascending aortic root aneurysm and bicuspid valve.     Echo 8/20/19 EF 53% LORIN 73 LIZETTE 1.43 (Bicuspid valve), mild to moderate MR, mild to moderate TR      Update:  Remains well. Denies syncope, shortness of breath. Continues to have steps >56981.  To have shoulder surgery in the near future.  Echo 8/4/20 EF 55% severe bi-atrial enlargement mild to moderate MR bicuspid aortic valve LIZETTE 1.07      Review of Systems   Constitution: Negative. Negative for fever and malaise/fatigue.   HENT: Negative for congestion and sore throat.    Cardiovascular: Negative for chest pain, dyspnea on exertion, irregular heartbeat, leg swelling, near-syncope, orthopnea, palpitations, paroxysmal nocturnal dyspnea and syncope.   Respiratory: Negative for cough and shortness of breath.    Gastrointestinal: Negative for abdominal pain, constipation and diarrhea.   Neurological: Negative for dizziness, light-headedness and weakness.   Psychiatric/Behavioral: Negative for depression. The patient is not nervous/anxious.         Objective:    Physical Exam   Constitutional: He is oriented to person, place, and time. He appears well-developed and well-nourished.   Eyes: Conjunctivae are normal. No scleral icterus.   Neck: No JVD present. No tracheal deviation present.   Cardiovascular: Normal rate and normal heart sounds. An irregularly irregular rhythm present. PMI is not displaced.   Pulmonary/Chest: Effort normal and breath sounds normal. No respiratory distress.   Abdominal: Soft. There  is no hepatosplenomegaly. There is no abdominal tenderness.   Musculoskeletal:         General: No tenderness or edema (lower extremity).   Neurological: He is alert and oriented to person, place, and time.   Skin: Skin is warm and dry. No rash noted.   Psychiatric: He has a normal mood and affect. His behavior is normal.         Assessment:       1. Permanent atrial fibrillation    2. Bicuspid aortic valve    3. Aortic root enlargement         Plan:           Permanent atrial fibrillation.  Remains rate controlled and asymptomatic.  Continue current medications.  F/u in one year.

## 2020-11-03 ENCOUNTER — TELEPHONE (OUTPATIENT)
Dept: ELECTROPHYSIOLOGY | Facility: CLINIC | Age: 70
End: 2020-11-03

## 2020-11-03 ENCOUNTER — PATIENT OUTREACH (OUTPATIENT)
Dept: ADMINISTRATIVE | Facility: OTHER | Age: 70
End: 2020-11-03

## 2020-11-03 NOTE — TELEPHONE ENCOUNTER
Spoke with pt who confirmed his appts with SK tomorrow morning.  ----- Message from Ling Conn sent at 11/3/2020 10:49 AM CST -----  Regarding: returning a call  Contact: patient  The pt is returning a call. Please call him back @ [phone]. Thanks, Ling

## 2020-11-04 ENCOUNTER — OFFICE VISIT (OUTPATIENT)
Dept: ELECTROPHYSIOLOGY | Facility: CLINIC | Age: 70
End: 2020-11-04
Payer: COMMERCIAL

## 2020-11-04 ENCOUNTER — HOSPITAL ENCOUNTER (OUTPATIENT)
Dept: CARDIOLOGY | Facility: CLINIC | Age: 70
Discharge: HOME | End: 2020-11-04
Payer: COMMERCIAL

## 2020-11-04 ENCOUNTER — OFFICE VISIT (OUTPATIENT)
Dept: INTERNAL MEDICINE | Facility: CLINIC | Age: 70
End: 2020-11-04
Attending: INTERNAL MEDICINE
Payer: COMMERCIAL

## 2020-11-04 ENCOUNTER — IMMUNIZATION (OUTPATIENT)
Dept: PHARMACY | Facility: CLINIC | Age: 70
End: 2020-11-04
Payer: COMMERCIAL

## 2020-11-04 VITALS
SYSTOLIC BLOOD PRESSURE: 118 MMHG | HEIGHT: 72 IN | DIASTOLIC BLOOD PRESSURE: 80 MMHG | WEIGHT: 189.38 LBS | HEART RATE: 74 BPM | BODY MASS INDEX: 25.65 KG/M2

## 2020-11-04 VITALS
BODY MASS INDEX: 25.41 KG/M2 | SYSTOLIC BLOOD PRESSURE: 108 MMHG | OXYGEN SATURATION: 97 % | HEIGHT: 72 IN | DIASTOLIC BLOOD PRESSURE: 70 MMHG | HEART RATE: 80 BPM | WEIGHT: 187.63 LBS

## 2020-11-04 DIAGNOSIS — G47.00 INSOMNIA, UNSPECIFIED TYPE: ICD-10-CM

## 2020-11-04 DIAGNOSIS — Z00.00 ROUTINE PHYSICAL EXAMINATION: Primary | ICD-10-CM

## 2020-11-04 DIAGNOSIS — D64.9 ANEMIA, UNSPECIFIED TYPE: ICD-10-CM

## 2020-11-04 DIAGNOSIS — Q23.1 BICUSPID AORTIC VALVE: ICD-10-CM

## 2020-11-04 DIAGNOSIS — I48.21 PERMANENT ATRIAL FIBRILLATION: ICD-10-CM

## 2020-11-04 DIAGNOSIS — Z12.5 SCREENING FOR PROSTATE CANCER: ICD-10-CM

## 2020-11-04 DIAGNOSIS — R60.9 EDEMA, UNSPECIFIED TYPE: ICD-10-CM

## 2020-11-04 DIAGNOSIS — I48.21 PERMANENT ATRIAL FIBRILLATION: Primary | ICD-10-CM

## 2020-11-04 DIAGNOSIS — R13.10 SWALLOWING PROBLEM: ICD-10-CM

## 2020-11-04 DIAGNOSIS — I49.8 OTHER SPECIFIED CARDIAC ARRHYTHMIAS: ICD-10-CM

## 2020-11-04 DIAGNOSIS — M19.019 ARTHRITIS OF SHOULDER: ICD-10-CM

## 2020-11-04 DIAGNOSIS — I77.89 AORTIC ROOT ENLARGEMENT: ICD-10-CM

## 2020-11-04 DIAGNOSIS — I48.91 ATRIAL FIBRILLATION, UNSPECIFIED TYPE: ICD-10-CM

## 2020-11-04 PROCEDURE — 93005 ELECTROCARDIOGRAM TRACING: CPT | Mod: S$GLB,,, | Performed by: INTERNAL MEDICINE

## 2020-11-04 PROCEDURE — 99214 OFFICE O/P EST MOD 30 MIN: CPT | Mod: S$GLB,,, | Performed by: INTERNAL MEDICINE

## 2020-11-04 PROCEDURE — 1101F PT FALLS ASSESS-DOCD LE1/YR: CPT | Mod: CPTII,S$GLB,, | Performed by: INTERNAL MEDICINE

## 2020-11-04 PROCEDURE — 99999 PR PBB SHADOW E&M-EST. PATIENT-LVL III: ICD-10-PCS | Mod: PBBFAC,,, | Performed by: INTERNAL MEDICINE

## 2020-11-04 PROCEDURE — 99999 PR PBB SHADOW E&M-EST. PATIENT-LVL III: CPT | Mod: PBBFAC,,, | Performed by: INTERNAL MEDICINE

## 2020-11-04 PROCEDURE — 93010 RHYTHM STRIP: ICD-10-PCS | Mod: S$GLB,,, | Performed by: INTERNAL MEDICINE

## 2020-11-04 PROCEDURE — 99397 PR PREVENTIVE VISIT,EST,65 & OVER: ICD-10-PCS | Mod: S$GLB,,, | Performed by: INTERNAL MEDICINE

## 2020-11-04 PROCEDURE — 3008F BODY MASS INDEX DOCD: CPT | Mod: CPTII,S$GLB,, | Performed by: INTERNAL MEDICINE

## 2020-11-04 PROCEDURE — 1126F PR PAIN SEVERITY QUANTIFIED, NO PAIN PRESENT: ICD-10-PCS | Mod: S$GLB,,, | Performed by: INTERNAL MEDICINE

## 2020-11-04 PROCEDURE — 99999 PR PBB SHADOW E&M-EST. PATIENT-LVL V: CPT | Mod: PBBFAC,,, | Performed by: INTERNAL MEDICINE

## 2020-11-04 PROCEDURE — 1159F PR MEDICATION LIST DOCUMENTED IN MEDICAL RECORD: ICD-10-PCS | Mod: S$GLB,,, | Performed by: INTERNAL MEDICINE

## 2020-11-04 PROCEDURE — 3008F PR BODY MASS INDEX (BMI) DOCUMENTED: ICD-10-PCS | Mod: CPTII,S$GLB,, | Performed by: INTERNAL MEDICINE

## 2020-11-04 PROCEDURE — 93010 ELECTROCARDIOGRAM REPORT: CPT | Mod: S$GLB,,, | Performed by: INTERNAL MEDICINE

## 2020-11-04 PROCEDURE — 99214 PR OFFICE/OUTPT VISIT, EST, LEVL IV, 30-39 MIN: ICD-10-PCS | Mod: S$GLB,,, | Performed by: INTERNAL MEDICINE

## 2020-11-04 PROCEDURE — 1101F PR PT FALLS ASSESS DOC 0-1 FALLS W/OUT INJ PAST YR: ICD-10-PCS | Mod: CPTII,S$GLB,, | Performed by: INTERNAL MEDICINE

## 2020-11-04 PROCEDURE — 1126F AMNT PAIN NOTED NONE PRSNT: CPT | Mod: S$GLB,,, | Performed by: INTERNAL MEDICINE

## 2020-11-04 PROCEDURE — 93005 RHYTHM STRIP: ICD-10-PCS | Mod: S$GLB,,, | Performed by: INTERNAL MEDICINE

## 2020-11-04 PROCEDURE — 1159F MED LIST DOCD IN RCRD: CPT | Mod: S$GLB,,, | Performed by: INTERNAL MEDICINE

## 2020-11-04 PROCEDURE — 99397 PER PM REEVAL EST PAT 65+ YR: CPT | Mod: S$GLB,,, | Performed by: INTERNAL MEDICINE

## 2020-11-04 PROCEDURE — 99999 PR PBB SHADOW E&M-EST. PATIENT-LVL V: ICD-10-PCS | Mod: PBBFAC,,, | Performed by: INTERNAL MEDICINE

## 2020-11-04 RX ORDER — ZOLPIDEM TARTRATE 10 MG/1
10 TABLET ORAL NIGHTLY PRN
Qty: 30 TABLET | Refills: 2 | Status: SHIPPED | OUTPATIENT
Start: 2020-11-04 | End: 2021-10-13 | Stop reason: SDUPTHER

## 2020-11-04 NOTE — PROGRESS NOTES
Subjective:       Patient ID: Navjot Fine is a 70 y.o. male.    Chief Complaint: Annual Exam    Patient comes in for annual exam.  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  He is considering having right shoulder surgery resurfacing over the break.  He has some pain and stiffness but may get a 2nd opinion.  He has had 2 hip replacement surgeries.  Would like to update some labs including his PT INR.  He has had some slight trouble swallowing pills and maybe gets a little hoarse at the end of lecturing.  No trouble swallowing but after our discussion he would like to consider an ENT evaluation.    Review of Systems   Constitutional: Negative for chills, fatigue, fever and unexpected weight change.   HENT: Positive for trouble swallowing (See the HPI) and voice change. Negative for nosebleeds.    Eyes: Negative for pain and visual disturbance.   Respiratory: Negative for cough, shortness of breath and wheezing.    Cardiovascular: Negative for chest pain and palpitations.   Gastrointestinal: Negative for abdominal pain, constipation, diarrhea, nausea and vomiting.   Genitourinary: Negative for difficulty urinating and hematuria.        Occasional nocturia   Musculoskeletal: Positive for arthralgias ( right shoulder). Negative for neck pain.   Integumentary:  Negative for rash.   Neurological: Negative for dizziness and headaches.   Hematological: Does not bruise/bleed easily.   Psychiatric/Behavioral: Negative for dysphoric mood, sleep disturbance and suicidal ideas.         Objective:      Physical Exam  Constitutional:       General: He is not in acute distress.     Appearance: He is well-developed.   HENT:      Head: Normocephalic and atraumatic.      Right Ear: Tympanic membrane, ear canal and external ear normal.      Left Ear: Tympanic membrane, ear canal and external ear normal.      Nose: Nose normal. No rhinorrhea.      Mouth/Throat:      Pharynx: No oropharyngeal  exudate.   Eyes:      Conjunctiva/sclera: Conjunctivae normal.      Pupils: Pupils are equal, round, and reactive to light.   Neck:      Musculoskeletal: Normal range of motion and neck supple.      Thyroid: No thyromegaly.      Vascular: No JVD.      Comments: No supraclavicular nodes palpated bilaterally.   Cardiovascular:      Rate and Rhythm: Normal rate and regular rhythm.      Heart sounds: Normal heart sounds. No murmur.   Pulmonary:      Effort: Pulmonary effort is normal.      Breath sounds: Normal breath sounds. No wheezing or rales.   Abdominal:      General: Bowel sounds are normal. There is no distension.      Palpations: Abdomen is soft. There is no mass.      Tenderness: There is no abdominal tenderness.   Genitourinary:     Prostate: Enlarged. Not tender.      Rectum: Normal. Guaiac result negative. No tenderness.   Musculoskeletal: Normal range of motion.         General: No tenderness.   Lymphadenopathy:      Cervical: No cervical adenopathy.      Upper Body:      Right upper body: No supraclavicular adenopathy.      Left upper body: No supraclavicular adenopathy.   Skin:     General: Skin is warm and dry.      Findings: No rash.   Neurological:      Mental Status: He is alert and oriented to person, place, and time.      Cranial Nerves: No cranial nerve deficit.      Deep Tendon Reflexes: Reflexes are normal and symmetric.      Reflex Scores:       Bicep reflexes are 2+ on the right side and 2+ on the left side.       Patellar reflexes are 2+ on the right side and 2+ on the left side.  Psychiatric:         Mood and Affect: Mood normal. Mood is not depressed.         Behavior: Behavior normal.         Assessment:       1. Routine physical examination    2. Permanent atrial fibrillation    3. Bicuspid aortic valve    4. Screening for prostate cancer    5. Arthritis of shoulder    6. Atrial fibrillation, unspecified type    7. Anemia, unspecified type    8. Insomnia, unspecified type    9. Swallowing  problem    10. Edema, unspecified type        Plan:       Navjot was seen today for annual exam.    Diagnoses and all orders for this visit:    Routine physical examination  -     Lipid Panel; Future  -     PSA, Screening; Future  -     CBC Auto Differential; Future  -     Comprehensive Metabolic Panel; Future  -     TSH; Future  -     Protime-INR; Future    Permanent atrial fibrillation    Bicuspid aortic valve    Screening for prostate cancer  -     PSA, Screening; Future    Arthritis of shoulder    Atrial fibrillation, unspecified type  -     CBC Auto Differential; Future  -     Comprehensive Metabolic Panel; Future  -     TSH; Future  -     Protime-INR; Future    Anemia, unspecified type  -     Lipid Panel; Future    Insomnia, unspecified type    Swallowing problem  -     Ambulatory referral/consult to ENT; Future    Edema, unspecified type  -     BNP; Future    Other orders  -     zolpidem (AMBIEN) 10 mg Tab; Take 1 tablet (10 mg total) by mouth nightly as needed.

## 2020-11-05 ENCOUNTER — ANTI-COAG VISIT (OUTPATIENT)
Dept: CARDIOLOGY | Facility: CLINIC | Age: 70
End: 2020-11-05
Payer: COMMERCIAL

## 2020-11-05 DIAGNOSIS — Z79.01 LONG TERM (CURRENT) USE OF ANTICOAGULANTS: Primary | ICD-10-CM

## 2020-11-05 PROCEDURE — 93793 PR ANTICOAGULANT MGMT FOR PT TAKING WARFARIN: ICD-10-PCS | Mod: S$GLB,,,

## 2020-11-05 PROCEDURE — 93793 ANTICOAG MGMT PT WARFARIN: CPT | Mod: S$GLB,,,

## 2020-11-05 NOTE — PROGRESS NOTES
INR not at goal - taking dose higher than advised. Medications, chart, and patient findings reviewed. See calendar for adjustments to dose and follow up plan.

## 2020-11-09 ENCOUNTER — PATIENT MESSAGE (OUTPATIENT)
Dept: INTERNAL MEDICINE | Facility: CLINIC | Age: 70
End: 2020-11-09

## 2020-11-09 DIAGNOSIS — I48.91 ATRIAL FIBRILLATION, UNSPECIFIED TYPE: ICD-10-CM

## 2020-11-09 DIAGNOSIS — R60.9 EDEMA, UNSPECIFIED TYPE: Primary | ICD-10-CM

## 2020-11-16 ENCOUNTER — PATIENT MESSAGE (OUTPATIENT)
Dept: SPORTS MEDICINE | Facility: CLINIC | Age: 70
End: 2020-11-16

## 2020-11-20 ENCOUNTER — PATIENT MESSAGE (OUTPATIENT)
Dept: INTERNAL MEDICINE | Facility: CLINIC | Age: 70
End: 2020-11-20

## 2020-11-20 ENCOUNTER — PATIENT MESSAGE (OUTPATIENT)
Dept: ELECTROPHYSIOLOGY | Facility: CLINIC | Age: 70
End: 2020-11-20

## 2020-11-20 ENCOUNTER — TELEPHONE (OUTPATIENT)
Dept: SPORTS MEDICINE | Facility: CLINIC | Age: 70
End: 2020-11-20

## 2020-11-20 ENCOUNTER — TELEPHONE (OUTPATIENT)
Dept: ELECTROPHYSIOLOGY | Facility: CLINIC | Age: 70
End: 2020-11-20

## 2020-11-20 DIAGNOSIS — Z01.818 PREOPERATIVE TESTING: Primary | ICD-10-CM

## 2020-11-20 NOTE — TELEPHONE ENCOUNTER
Pt is cleared for surgery from an internal medicine standpoint. I would also recommend cardiology statement of clearance based on Afib and heart valve

## 2020-11-20 NOTE — TELEPHONE ENCOUNTER
Spoke with pt to inform him that we cannot give medical clearance, as Dr Sosa specifically treats the electrical part of his heart.  Pt had mentioned procedure to SK at most recent OV, but at the time, he was unaware that he would need clearance.  After discussing with Deb, we will schedule consult with [facility].  ----- Message from Ling Conn sent at 11/20/2020  3:03 PM CST -----  Regarding: returning a call  Contact: patient  The pt is returning a call. Please call him back and he said that you had his number. Thanks, Ling

## 2020-11-20 NOTE — TELEPHONE ENCOUNTER
Attempted to contact pt to discuss clearance for sports med procedure.  Asked that he give us a call back.

## 2020-11-25 NOTE — PROGRESS NOTES
Cardiology Clinic Note  Reason for Visit: preop cardiovascular exam    HPI:   Mr. Navjot Fine is a 70 y.o. gentleman with bicuspid aortic valve with mild stenosis (mean gradient 10, peak velocity 2.14 m/s, LIZETTE 1.07), ascending aortic root enlargement (4.5 cm 6/2020), permanent atrial fibrillation on warfarin. He is planning on right shoulder surgery and presents today for preoperative examination. He is feeling well. Very active, walks every day and averages 20K to 25 K steps per day. No other issues.     ROS:    Review of Systems   Constitution: Negative for chills and fever.   Cardiovascular: Negative for chest pain, dyspnea on exertion, palpitations and syncope.   Respiratory: Negative for cough and sleep disturbances due to breathing.    Musculoskeletal: Negative for back pain.   Gastrointestinal: Negative for abdominal pain, nausea and vomiting.   Neurological: Negative for dizziness and focal weakness.   Psychiatric/Behavioral: Negative for altered mental status.     PMH:     Past Medical History:   Diagnosis Date    Atrial fibrillation     Cholelithiasis      History reviewed. No pertinent surgical history.  Allergies:     Review of patient's allergies indicates:   Allergen Reactions    No known allergies      Medications:     Current Outpatient Medications on File Prior to Visit   Medication Sig Dispense Refill    atenoloL (TENORMIN) 50 MG tablet TAKE 1 TABLET(50 MG) BY MOUTH EVERY DAY 30 tablet 11    b complex vitamins capsule Take 1 capsule by mouth once daily.      diltiaZEM (CARDIZEM CD) 240 MG 24 hr capsule Take 1 capsule (240 mg total) by mouth once daily. 90 capsule 3    fluconazole (DIFLUCAN) 150 MG Tab 150 mg once a week.       warfarin (COUMADIN) 2 MG tablet TAKE 1 TABLET BY MOUTH DAILY EXCEPT TUESDAYS AND SATURDAYS OR AS DIRECTED BY COUMADIN CLINIC 90 tablet 2    warfarin (COUMADIN) 5 MG tablet TAKE 1 TABLET BY MOUTH 1 TIME DAILY 90 tablet 2    zolpidem (AMBIEN) 10 mg Tab Take 1  tablet (10 mg total) by mouth nightly as needed. 30 tablet 2    luliconazole 1 % Crea APPLY AS A THIN LAYER TO THE AFFECTED AREA(S) PLUS A 1 INCH MARGIN OF HEALTHY SURROUNDING SKIN ONCE DAILY       No current facility-administered medications on file prior to visit.      Social History:     Social History     Tobacco Use    Smoking status: Never Smoker    Smokeless tobacco: Never Used   Substance Use Topics    Alcohol use: Never     Frequency: Never     Comment: O[facility].     Family History:   History reviewed. No pertinent family history.  Physical Exam:   /70   Pulse 97   Ht 6' (1.829 m)   Wt 86.2 kg (190 lb 0.6 oz)   SpO2 97%   BMI 25.77 kg/m²      Physical Exam   Constitutional: He is oriented to person, place, and time. He appears well-developed and well-nourished. No distress.   HENT:   Head: Normocephalic and atraumatic.   Eyes: Conjunctivae and EOM are normal.   Neck: Normal range of motion. No tracheal deviation present.   Cardiovascular: Normal rate and normal heart sounds.   No murmur heard.  Irregularly irregular rhythm   Pulmonary/Chest: Effort normal and breath sounds normal. No respiratory distress. He has no wheezes.   Abdominal: Soft. Bowel sounds are normal. He exhibits no distension. There is no abdominal tenderness.   Musculoskeletal: Normal range of motion.         General: No edema.   Neurological: He is alert and oriented to person, place, and time.   Skin: He is not diaphoretic.       Labs:     Lab Results   Component Value Date     11/04/2020    K 4.3 11/04/2020     11/04/2020    CO2 28 11/04/2020    BUN 22 11/04/2020    CREATININE 1.1 11/04/2020    ANIONGAP 8 11/04/2020     Lab Results   Component Value Date    HGBA1C 4.7 11/08/2019     Lab Results   Component Value Date     (H) 11/04/2020    Lab Results   Component Value Date    WBC 5.38 11/04/2020    HGB 13.5 (L) 11/04/2020    HCT 43.4 11/04/2020     11/04/2020    GRAN 3.3 11/04/2020    GRAN 60.4  11/04/2020     Lab Results   Component Value Date    CHOL 200 (H) 11/04/2020    HDL 66 11/04/2020    LDLCALC 120.4 11/04/2020    TRIG 68 11/04/2020          Imaging:     TTE 8/4/2020  · Low normal left ventricular systolic function. The estimated ejection fraction is 55%.  · No wall motion abnormalities.  · Concentric left ventricular remodeling.  · Normal LV diastolic function.  · Severe left atrial enlargement.  · Normal right ventricular systolic function.  · Mild right ventricular enlargement.  · Severe right atrial enlargement.  · The aortic valve is bileaflet.  · Mild aortic valve stenosis.  · Aortic valve area is 1.07 cm2; peak velocity is 2.14 m/s; mean gradient is 10 mmHg.  · Mild--moderate to at most moderate (2+) mitral regurgitation.  · Mild tricuspid regurgitation.  · Elevated central venous pressure (15 mmHg).  · The estimated PA systolic pressure is 36 mmHg.  · The ascending aorta is mildly dilated.    Assessment:     1. Preoperative cardiovascular examination    2. Permanent atrial fibrillation    3. Bicuspid aortic valve    4. Aortic root enlargement        Plan:   Preoperative cardiovascular examination  - RCRI is 0 with 30 day 3/9% risk of death, MI or cardiac arrest  - No active cardiac conditions. Excellent functional capacity.  - Ok to proceed with surgery without further testing  - Patient will hold warfarin 5-7 days prior to surgery. Restart AC when ok by surgeon.    Permanent atrial fibrillation  - Rate controlled.  - [id number] is 1. He has been on warfarin for around 15 years. We discussed switching to DOAC. He will think about it and let me know. If he decides to switch, would recommend starting after surgery.    Bicuspid aortic valve  - TTE from 8/2020 shows mild AS. Continue to monitor yearly.     Aortic root enlargement  - Aortic root is 4.5 cm per last CT. Follows with Dr Tadeo every year.       Discussed with Dr. Bautista. RTC as needed.     Deyvi Walters  Cardiology Fellow,  PGY5

## 2020-11-27 ENCOUNTER — OFFICE VISIT (OUTPATIENT)
Dept: CARDIOLOGY | Facility: CLINIC | Age: 70
End: 2020-11-27
Payer: COMMERCIAL

## 2020-11-27 VITALS
HEIGHT: 72 IN | HEART RATE: 97 BPM | SYSTOLIC BLOOD PRESSURE: 108 MMHG | WEIGHT: 190.06 LBS | DIASTOLIC BLOOD PRESSURE: 70 MMHG | BODY MASS INDEX: 25.74 KG/M2 | OXYGEN SATURATION: 97 %

## 2020-11-27 DIAGNOSIS — I77.89 AORTIC ROOT ENLARGEMENT: ICD-10-CM

## 2020-11-27 DIAGNOSIS — Q23.1 BICUSPID AORTIC VALVE: ICD-10-CM

## 2020-11-27 DIAGNOSIS — Z01.810 PREOPERATIVE CARDIOVASCULAR EXAMINATION: Primary | ICD-10-CM

## 2020-11-27 DIAGNOSIS — I48.21 PERMANENT ATRIAL FIBRILLATION: ICD-10-CM

## 2020-11-27 PROCEDURE — 99214 OFFICE O/P EST MOD 30 MIN: CPT | Mod: S$GLB,,, | Performed by: INTERNAL MEDICINE

## 2020-11-27 PROCEDURE — 99214 PR OFFICE/OUTPT VISIT, EST, LEVL IV, 30-39 MIN: ICD-10-PCS | Mod: S$GLB,,, | Performed by: INTERNAL MEDICINE

## 2020-11-27 PROCEDURE — 1159F PR MEDICATION LIST DOCUMENTED IN MEDICAL RECORD: ICD-10-PCS | Mod: S$GLB,,, | Performed by: INTERNAL MEDICINE

## 2020-11-27 PROCEDURE — 1159F MED LIST DOCD IN RCRD: CPT | Mod: S$GLB,,, | Performed by: INTERNAL MEDICINE

## 2020-11-27 PROCEDURE — 99999 PR PBB SHADOW E&M-EST. PATIENT-LVL IV: ICD-10-PCS | Mod: PBBFAC,,, | Performed by: INTERNAL MEDICINE

## 2020-11-27 PROCEDURE — 99999 PR PBB SHADOW E&M-EST. PATIENT-LVL IV: CPT | Mod: PBBFAC,,, | Performed by: INTERNAL MEDICINE

## 2020-12-03 ENCOUNTER — TELEPHONE (OUTPATIENT)
Dept: SPORTS MEDICINE | Facility: CLINIC | Age: 70
End: 2020-12-03

## 2020-12-03 ENCOUNTER — PATIENT MESSAGE (OUTPATIENT)
Dept: SPORTS MEDICINE | Facility: CLINIC | Age: 70
End: 2020-12-03

## 2020-12-03 DIAGNOSIS — M19.019 ARTHRITIS OF SHOULDER: Primary | ICD-10-CM

## 2020-12-03 DIAGNOSIS — M19.011 PRIMARY OSTEOARTHRITIS OF RIGHT SHOULDER: ICD-10-CM

## 2020-12-03 DIAGNOSIS — M75.21 BICEPS TENDINITIS OF RIGHT SHOULDER: ICD-10-CM

## 2020-12-03 NOTE — TELEPHONE ENCOUNTER
Spoke to the patient and rescheduled his COVID test to a later time on 12/15   ----- Message from Joanna Gonzales sent at 12/3/2020  4:07 PM CST -----  Pt is returning a missed call he received.    Contact [employer] [phone]

## 2020-12-07 NOTE — ANESTHESIA PAT ROS NOTE
12/07/2020  Navjot Fine is a 70 y.o., male.      Pre-op Assessment          Review of Systems         Anesthesia Assessment: Preoperative EQUATION    Planned Procedure: Procedure(s) (LRB):  ARTHROPLASTY, SHOULDER (Right)  FIXATION, TENDON (Right)  DEBRIDEMENT, SHOULDER, ARTHROSCOPIC (Right)  Requested Anesthesia Type:General  Surgeon: Elias Saeed MD  Service: Orthopedics  Known or anticipated Date of Surgery:12/18/2020    Surgeon notes: reviewed    Electronic QUestionnaire Assessment completed via nurse interview with patient.        Triage considerations:     The patient has no apparent active cardiac condition (No unstable coronary Syndrome such as severe unstable angina or recent [<1 month] myocardial infarction, decompensated CHF, severe valvular   disease or significant arrhythmia)    Previous anesthesia records:Not available    Last PCP note: within 3 months , within [facility]   Subspecialty notes: Cardiology: General and Transplant, SPORTS MED, CARDIOTHORACIC    Other important co-morbidities: A FIB      Tests already available:  Available tests,  within 3 months , within [facility] .11/4/20-BNP,PT/INR,TSH,CMP,CBC,PSA, LIPID PANEL, EKG  8/4/20-ECHO(EF55%)            Instructions given. (See in Nurse's note)    Optimization:  Anesthesia Preop Clinic Assessment  Indicated-WILL SCHEDULE POC    Medical Opinion Indicated       Sub-specialist consult indicated:   [facility] CARDIOLOGY WITH MED INSTRUCTIONS, [facility] PCP      Plan:    Testing:  PTT, PT/INR AM OF SURGERY   Pre-anesthesia  visit       Visit focus: possible regional anesthesia and/or nerve block      Consultation:[facility] CARDIOLOGY WITH MED INSTRUCTIONS, [facility] PCP     Patient  has previously scheduled Medical Appointment:12/15    Navigation: Tests Scheduled.              Consults scheduled.             Results will be tracked by Preop Clinic.       Spencer  FAVIOLA Beth MD (PCP)     Pt is cleared for surgery from an internal medicine standpoint. I would also recommend cardiology statement of clearance based on Afib and heart valve         (Cardiology)Preoperative cardiovascular examination  - RCRI is 0 with 30 day 3/9% risk of death, MI or cardiac arrest  - No active cardiac conditions. Excellent functional capacity.  - Ok to proceed with surgery without further testing  - Patient will hold warfarin 5-7 days prior to surgery. Restart AC when ok by surgeon    (Ortho)-Hold Coumadin 5 days prior to surgery

## 2020-12-09 ENCOUNTER — TELEPHONE (OUTPATIENT)
Dept: PREADMISSION TESTING | Facility: HOSPITAL | Age: 70
End: 2020-12-09

## 2020-12-09 NOTE — TELEPHONE ENCOUNTER
----- Message from Tanya Coulter RN sent at 12/7/2020 12:12 PM CST -----  12/18/20-PLEASE CALL PATIENT AND SCHEDULE POC/LAB. ALREADY CLEARED WOULD BE A GOOD PATIENT FOR RESIDENT SCHEDULE.      THANKS

## 2020-12-15 ENCOUNTER — HOSPITAL ENCOUNTER (OUTPATIENT)
Dept: PREADMISSION TESTING | Facility: HOSPITAL | Age: 70
Discharge: HOME | End: 2020-12-15
Attending: ANESTHESIOLOGY
Payer: COMMERCIAL

## 2020-12-15 ENCOUNTER — OFFICE VISIT (OUTPATIENT)
Dept: SPORTS MEDICINE | Facility: CLINIC | Age: 70
End: 2020-12-15
Payer: COMMERCIAL

## 2020-12-15 ENCOUNTER — LAB VISIT (OUTPATIENT)
Dept: INTERNAL MEDICINE | Facility: CLINIC | Age: 70
End: 2020-12-15
Payer: COMMERCIAL

## 2020-12-15 ENCOUNTER — ANESTHESIA EVENT (OUTPATIENT)
Dept: SURGERY | Facility: HOSPITAL | Age: 70
End: 2020-12-15
Payer: COMMERCIAL

## 2020-12-15 VITALS
HEART RATE: 85 BPM | SYSTOLIC BLOOD PRESSURE: 112 MMHG | DIASTOLIC BLOOD PRESSURE: 76 MMHG | HEIGHT: 72 IN | BODY MASS INDEX: 25.73 KG/M2 | WEIGHT: 190 LBS

## 2020-12-15 VITALS
HEART RATE: 98 BPM | WEIGHT: 189 LBS | BODY MASS INDEX: 25.6 KG/M2 | TEMPERATURE: 98 F | RESPIRATION RATE: 16 BRPM | HEIGHT: 72 IN | DIASTOLIC BLOOD PRESSURE: 70 MMHG | OXYGEN SATURATION: 99 % | SYSTOLIC BLOOD PRESSURE: 110 MMHG

## 2020-12-15 DIAGNOSIS — M19.011 PRIMARY OSTEOARTHRITIS OF RIGHT SHOULDER: Primary | ICD-10-CM

## 2020-12-15 DIAGNOSIS — Z01.818 PREOPERATIVE TESTING: ICD-10-CM

## 2020-12-15 DIAGNOSIS — M75.21 BICEPS TENDINITIS OF RIGHT SHOULDER: ICD-10-CM

## 2020-12-15 DIAGNOSIS — I48.91 ATRIAL FIBRILLATION, UNSPECIFIED TYPE: ICD-10-CM

## 2020-12-15 PROCEDURE — 3288F PR FALLS RISK ASSESSMENT DOCUMENTED: ICD-10-PCS | Mod: CPTII,S$GLB,, | Performed by: PHYSICIAN ASSISTANT

## 2020-12-15 PROCEDURE — 3008F BODY MASS INDEX DOCD: CPT | Mod: CPTII,S$GLB,, | Performed by: PHYSICIAN ASSISTANT

## 2020-12-15 PROCEDURE — 3288F FALL RISK ASSESSMENT DOCD: CPT | Mod: CPTII,S$GLB,, | Performed by: PHYSICIAN ASSISTANT

## 2020-12-15 PROCEDURE — 99999 PR PBB SHADOW E&M-EST. PATIENT-LVL IV: ICD-10-PCS | Mod: PBBFAC,,, | Performed by: PHYSICIAN ASSISTANT

## 2020-12-15 PROCEDURE — 3008F PR BODY MASS INDEX (BMI) DOCUMENTED: ICD-10-PCS | Mod: CPTII,S$GLB,, | Performed by: PHYSICIAN ASSISTANT

## 2020-12-15 PROCEDURE — 1101F PR PT FALLS ASSESS DOC 0-1 FALLS W/OUT INJ PAST YR: ICD-10-PCS | Mod: CPTII,S$GLB,, | Performed by: PHYSICIAN ASSISTANT

## 2020-12-15 PROCEDURE — 99499 NO LOS: ICD-10-PCS | Mod: S$GLB,,, | Performed by: PHYSICIAN ASSISTANT

## 2020-12-15 PROCEDURE — 1126F PR PAIN SEVERITY QUANTIFIED, NO PAIN PRESENT: ICD-10-PCS | Mod: S$GLB,,, | Performed by: PHYSICIAN ASSISTANT

## 2020-12-15 PROCEDURE — U0003 INFECTIOUS AGENT DETECTION BY NUCLEIC ACID (DNA OR RNA); SEVERE ACUTE RESPIRATORY SYNDROME CORONAVIRUS 2 (SARS-COV-2) (CORONAVIRUS DISEASE [COVID-19]), AMPLIFIED PROBE TECHNIQUE, MAKING USE OF HIGH THROUGHPUT TECHNOLOGIES AS DESCRIBED BY CMS-2020-01-R: HCPCS

## 2020-12-15 PROCEDURE — 1126F AMNT PAIN NOTED NONE PRSNT: CPT | Mod: S$GLB,,, | Performed by: PHYSICIAN ASSISTANT

## 2020-12-15 PROCEDURE — 99499 UNLISTED E&M SERVICE: CPT | Mod: S$GLB,,, | Performed by: PHYSICIAN ASSISTANT

## 2020-12-15 PROCEDURE — 99999 PR PBB SHADOW E&M-EST. PATIENT-LVL IV: CPT | Mod: PBBFAC,,, | Performed by: PHYSICIAN ASSISTANT

## 2020-12-15 PROCEDURE — 1101F PT FALLS ASSESS-DOCD LE1/YR: CPT | Mod: CPTII,S$GLB,, | Performed by: PHYSICIAN ASSISTANT

## 2020-12-15 RX ORDER — DILTIAZEM HYDROCHLORIDE 240 MG/1
240 CAPSULE, COATED, EXTENDED RELEASE ORAL DAILY
Qty: 90 CAPSULE | Refills: 3 | Status: SHIPPED | OUTPATIENT
Start: 2020-12-15 | End: 2021-11-30

## 2020-12-15 RX ORDER — OXYCODONE AND ACETAMINOPHEN 10; 325 MG/1; MG/1
1 TABLET ORAL EVERY 6 HOURS PRN
Qty: 28 TABLET | Refills: 0 | Status: SHIPPED | OUTPATIENT
Start: 2020-12-15 | End: 2021-01-04 | Stop reason: SDUPTHER

## 2020-12-15 RX ORDER — AMOXICILLIN 250 MG
1 CAPSULE ORAL 2 TIMES DAILY
Status: CANCELLED | OUTPATIENT
Start: 2020-12-15

## 2020-12-15 RX ORDER — MUPIROCIN 20 MG/G
1 OINTMENT TOPICAL 2 TIMES DAILY
Status: CANCELLED | OUTPATIENT
Start: 2020-12-15 | End: 2020-12-20

## 2020-12-15 RX ORDER — PREGABALIN 25 MG/1
150 CAPSULE ORAL NIGHTLY
Status: CANCELLED | OUTPATIENT
Start: 2020-12-15

## 2020-12-15 RX ORDER — ROPIVACAINE/EPI/CLONIDINE/KET 2.46-0.005
SYRINGE (ML) INJECTION ONCE
Status: CANCELLED | OUTPATIENT
Start: 2020-12-15 | End: 2020-12-15

## 2020-12-15 RX ORDER — PROMETHAZINE HYDROCHLORIDE 25 MG/1
25 TABLET ORAL EVERY 4 HOURS PRN
Qty: 30 TABLET | Refills: 0 | Status: SHIPPED | OUTPATIENT
Start: 2020-12-15 | End: 2021-10-13

## 2020-12-15 RX ORDER — SODIUM CHLORIDE 0.9 % (FLUSH) 0.9 %
10 SYRINGE (ML) INJECTION
Status: CANCELLED | OUTPATIENT
Start: 2020-12-15

## 2020-12-15 RX ORDER — CELECOXIB 200 MG/1
200 CAPSULE ORAL 2 TIMES DAILY WITH MEALS
Qty: 60 CAPSULE | Refills: 0 | Status: SHIPPED | OUTPATIENT
Start: 2020-12-15 | End: 2021-01-19 | Stop reason: SDUPTHER

## 2020-12-15 RX ORDER — OXYCODONE HYDROCHLORIDE 5 MG/1
5 TABLET ORAL
Status: CANCELLED | OUTPATIENT
Start: 2020-12-15

## 2020-12-15 RX ORDER — MUPIROCIN 20 MG/G
OINTMENT TOPICAL
Status: CANCELLED | OUTPATIENT
Start: 2020-12-15

## 2020-12-15 RX ORDER — OXYCODONE HYDROCHLORIDE 5 MG/1
10 TABLET ORAL
Status: CANCELLED | OUTPATIENT
Start: 2020-12-15

## 2020-12-15 RX ORDER — FAMOTIDINE 20 MG/1
20 TABLET, FILM COATED ORAL 2 TIMES DAILY
Status: CANCELLED | OUTPATIENT
Start: 2020-12-15

## 2020-12-15 RX ORDER — ONDANSETRON 4 MG/1
8 TABLET, ORALLY DISINTEGRATING ORAL EVERY 8 HOURS PRN
Status: CANCELLED | OUTPATIENT
Start: 2020-12-15

## 2020-12-15 RX ORDER — POLYETHYLENE GLYCOL 3350 17 G/17G
17 POWDER, FOR SOLUTION ORAL DAILY
Status: CANCELLED | OUTPATIENT
Start: 2020-12-15

## 2020-12-15 RX ORDER — ACETAMINOPHEN 500 MG
1000 TABLET ORAL EVERY 6 HOURS
Status: CANCELLED | OUTPATIENT
Start: 2020-12-15 | End: 2020-12-17

## 2020-12-15 RX ORDER — DEXTROSE MONOHYDRATE AND SODIUM CHLORIDE 5; .9 G/100ML; G/100ML
INJECTION, SOLUTION INTRAVENOUS CONTINUOUS
Status: CANCELLED | OUTPATIENT
Start: 2020-12-15

## 2020-12-15 RX ORDER — ASPIRIN 325 MG
TABLET ORAL
Qty: 42 TABLET | Refills: 0 | Status: SHIPPED | OUTPATIENT
Start: 2020-12-15 | End: 2020-12-15

## 2020-12-15 RX ORDER — CELECOXIB 100 MG/1
200 CAPSULE ORAL 2 TIMES DAILY
Status: CANCELLED | OUTPATIENT
Start: 2020-12-16

## 2020-12-15 RX ORDER — CELECOXIB 100 MG/1
400 CAPSULE ORAL ONCE
Status: CANCELLED | OUTPATIENT
Start: 2020-12-15 | End: 2020-12-15

## 2020-12-15 RX ORDER — TRAMADOL HYDROCHLORIDE 50 MG/1
50 TABLET ORAL EVERY 6 HOURS PRN
Qty: 28 TABLET | Refills: 0 | Status: SHIPPED | OUTPATIENT
Start: 2020-12-15 | End: 2020-12-25

## 2020-12-15 RX ORDER — ROPIVACAINE HYDROCHLORIDE 2 MG/ML
6 INJECTION, SOLUTION EPIDURAL; INFILTRATION; PERINEURAL CONTINUOUS
Status: CANCELLED | OUTPATIENT
Start: 2020-12-15

## 2020-12-15 NOTE — ANESTHESIA PREPROCEDURE EVALUATION
"[facility]  Anesthesia Pre-Operative Evaluation         Patient Name: Navjot Fine  YOB: 1950  MRN: [id number]    SUBJECTIVE:     Pre-operative evaluation for Procedure(s) (LRB):  ARTHROPLASTY, SHOULDER (Right)  FIXATION, TENDON (Right)  DEBRIDEMENT, SHOULDER, ARTHROSCOPIC (Right)     12/15/2020    Navjot Fine is a 70 y.o. male w/ a significant PMHx of pAFib (since 2015) on atenolol and coumadin, bicuspid AV, aortic root enlargement, cholelithiasis, OA of R shoulder.    Pt states that he underwent a MATIAS in 2009 that was c/b a large bleed requiring transfusions post-op. States that during his MATIAS in 2014 the surgeons had "someone there to catch and re-adminster blood" during the procedure.    Seen by Dr Walters of Cardiology on 11/27/20 who gave cardiac clearance to proceed with surgery.    No anesthetic complications in past. METs > 4    Patient now presents for the above procedure(s).    TTE 8/4/20:  · Low normal left ventricular systolic function. The estimated ejection fraction is 55%.  · No wall motion abnormalities.  · Concentric left ventricular remodeling.  · Normal LV diastolic function.  · Severe left atrial enlargement.  · Normal right ventricular systolic function.  · Mild right ventricular enlargement.  · Severe right atrial enlargement.  · The aortic valve is bileaflet.  · Moderate-to-severe aortic valve stenosis.  · Aortic valve area is 1.07 cm2; peak velocity is 2.14 m/s; mean gradient is 10 mmHg.  · Mild--moderate to at most moderate (2+) mitral regurgitation.  · Mild tricuspid regurgitation.  · Elevated central venous pressure (15 mmHg).  · The estimated PA systolic pressure is 36 mmHg.  · The ascending aorta is mildly dilated.    LDA: None documented.       Prev airway: None documented.    Drips: None documented.      Patient Active Problem List   Diagnosis    Long term (current) use of anticoagulants    Atrial fibrillation    Bicuspid aortic valve    " Aortic root enlargement    Cholelithiasis    Right shoulder pain    Arthritis of shoulder    Primary osteoarthritis of right shoulder    Biceps tendinitis of right shoulder    Partial nontraumatic rupture of right rotator cuff       Review of patient's allergies indicates:   Allergen Reactions    No known allergies        Current Inpatient Medications:      No current facility-administered medications on file prior to encounter.      Current Outpatient Medications on File Prior to Encounter   Medication Sig Dispense Refill    atenoloL (TENORMIN) 50 MG tablet TAKE 1 TABLET(50 MG) BY MOUTH EVERY DAY 30 tablet 11    b complex vitamins capsule Take 1 capsule by mouth once daily.      fluconazole (DIFLUCAN) 150 MG Tab 150 mg once a week.       luliconazole 1 % Crea APPLY AS A THIN LAYER TO THE AFFECTED AREA(S) PLUS A 1 INCH MARGIN OF HEALTHY SURROUNDING SKIN ONCE DAILY      warfarin (COUMADIN) 2 MG tablet TAKE 1 TABLET BY MOUTH DAILY EXCEPT TUESDAYS AND SATURDAYS OR AS DIRECTED BY COUMADIN CLINIC 90 tablet 2    warfarin (COUMADIN) 5 MG tablet TAKE 1 TABLET BY MOUTH 1 TIME DAILY 90 tablet 2    zolpidem (AMBIEN) 10 mg Tab Take 1 tablet (10 mg total) by mouth nightly as needed. 30 tablet 2       Past Surgical History:   Procedure Laterality Date    TOTAL HIP ARTHROPLASTY Left 2009    Copious blood loss    TOTAL HIP ARTHROPLASTY Right 2014    Intra-op transfusion prepared       Social History     Socioeconomic History    Marital status:      Spouse name: Not on file    Number of children: Not on file    Years of education: Not on file    Highest education level: Not on file   Occupational History    Not on file   Social Needs    Financial resource strain: Not on file    Food insecurity     Worry: Not on file     Inability: Not on file    Transportation needs     Medical: Not on file     Non-medical: Not on file   Tobacco Use    Smoking status: Never Smoker    Smokeless tobacco: Never Used    Substance and Sexual Activity    Alcohol use: Never     Frequency: Never     Comment: GT.    Drug use: Never    Sexual activity: Not on file   Lifestyle    Physical activity     Days per week: Not on file     Minutes per session: Not on file    Stress: Not on file   Relationships    Social connections     Talks on phone: Not on file     Gets together: Not on file     Attends Latter day service: Not on file     Active member of club or organization: Not on file     Attends meetings of clubs or organizations: Not on file     Relationship status: Not on file   Other Topics Concern    Not on file   Social History Narrative    Not on file       OBJECTIVE:     Vital Signs Range (Last 24H):  Temp:  [36.8 °C (98.3 °F)]   Pulse:  [85-98]   Resp:  [16]   BP: (110-112)/(70-76)   SpO2:  [99 %]       Significant Labs:  Lab Results   Component Value Date    WBC 5.38 11/04/2020    HGB 13.5 (L) 11/04/2020    HCT 43.4 11/04/2020     11/04/2020    CHOL 200 (H) 11/04/2020    TRIG 68 11/04/2020    HDL 66 11/04/2020    ALT 26 11/04/2020    AST 40 11/04/2020     11/04/2020    K 4.3 11/04/2020     11/04/2020    CREATININE 1.1 11/04/2020    BUN 22 11/04/2020    CO2 28 11/04/2020    TSH 1.295 11/04/2020    PSA 3.8 11/04/2020    INR 3.3 (H) 11/04/2020    HGBA1C 4.7 11/08/2019       Diagnostic Studies: No relevant studies.    EKG:   Results for orders placed or performed during the hospital encounter of 07/13/18   EKG 12-lead    Collection Time: 07/13/18  2:33 PM    Narrative    Test Reason : R00.2,  Blood Pressure :   Vent. Rate : 091 BPM     Atrial Rate : 090 BPM     P-R Int : 000 ms          QRS Dur : 102 ms      QT Int : 354 ms       P-R-T Axes : 000 036 -05 degrees     QTc Int : 435 ms    Atrial fibrillation  Low voltage QRS  Incomplete right bundle branch block  Abnormal ECG  When compared with ECG of 11-JUL-2017 07:31,  Nonspecific T wave abnormality no longer evident in Lateral leads  Confirmed by  Manjula COREAS, Ricki Bruce ([id number]) on 7/13/2018 5:24:57 PM    Referred By: RICKI FAIR           Confirmed By:Ricki Fair MD       2D ECHO:  TTE:  Results for orders placed or performed during the hospital encounter of 08/04/20   Echo Color Flow Doppler? Yes   Result Value Ref Range    BSA 2.05 m2    TDI SEPTAL 0.11 m/s    LV LATERAL E/E' RATIO 6.91 m/s    LV SEPTAL E/E' RATIO 6.91 m/s    LA WIDTH 4.59 cm    TDI LATERAL 0.11 m/s    LVIDd 4.50 3.5 - 6.0 cm    IVS 0.95 0.6 - 1.1 cm    Posterior Wall 0.97 0.6 - 1.1 cm    LVIDs 3.17 2.1 - 4.0 cm    FS 30 28 - 44 %    LA volume 114.71 cm3    Sinus 4.14 cm    STJ 3.26 cm    Ascending aorta 4.34 cm    LV mass 144.94 g    LA size 4.05 cm    RVDD 4.83 cm    TAPSE 2.30 cm    Left Ventricle Relative Wall Thickness 0.43 cm    AV mean gradient 10 mmHg    AV valve area 1.07 cm2    AV Velocity Ratio 0.30     AV index (prosthetic) 0.25     MV valve area p 1/2 method 4.14 cm2    E/A ratio 1.38     Mean e' 0.11 m/s    E wave decelartion time 183.46 msec    Pulm vein S/D ratio 0.90     LVOT diameter 2.33 cm    LVOT area 4.3 cm2    LVOT peak butch 0.64 m/s    LVOT peak VTI 11.94 cm    Ao peak butch 2.14 m/s    Ao VTI 47.35 cm    Mr max butch 0.05 m/s    LVOT stroke volume 50.88 cm3    AV peak gradient 18 mmHg    E/E' ratio 6.91 m/s    MV Peak E Butch 0.76 m/s    TR Max Butch 2.27 m/s    MV stenosis pressure 1/2 time 53.20 ms    MV Peak A Butch 0.55 m/s    PV Peak S Butch 0.47 m/s    PV Peak D Butch 0.52 m/s    LV Systolic Volume 39.99 mL    LV Systolic Volume Index 19.5 mL/m2    LV Diastolic Volume 92.66 mL    LV Diastolic Volume Index 45.27 mL/m2    LA Volume Index 56.0 mL/m2    LV Mass Index 71 g/m2    RA Major Axis 6.93 cm    Left Atrium Minor Axis 7.22 cm    Left Atrium Major Axis 7.30 cm    Triscuspid Valve Regurgitation Peak Gradient 21 mmHg    RA Width 3.82 cm    Right Atrial Pressure (from IVC) 15 mmHg    TV rest pulmonary artery pressure 36 mmHg    Narrative    · Low  normal left ventricular systolic function. The estimated ejection   fraction is 55%.  · No wall motion abnormalities.  · Concentric left ventricular remodeling.  · Normal LV diastolic function.  · Severe left atrial enlargement.  · Normal right ventricular systolic function.  · Mild right ventricular enlargement.  · Severe right atrial enlargement.  · The aortic valve is bileaflet.  · Moderate-to-severe aortic valve stenosis.  · Aortic valve area is 1.07 cm2; peak velocity is 2.14 m/s; mean gradient   is 10 mmHg.  · Mild--moderate to at most moderate (2+) mitral regurgitation.  · Mild tricuspid regurgitation.  · Elevated central venous pressure (15 mmHg).  · The estimated PA systolic pressure is 36 mmHg.  · The ascending aorta is mildly dilated.          VALARIE:  No results found for this or any previous visit.    ASSESSMENT/PLAN:         Anesthesia Evaluation    I have reviewed the Patient Summary Reports.    I have reviewed the Nursing Notes.    I have reviewed the Medications.     Review of Systems  Anesthesia Hx:  No problems with previous Anesthesia Denies Hx of Anesthetic complications  History of prior surgery of interest to airway management or planning: Denies Family Hx of Anesthesia complications.   Denies Personal Hx of Anesthesia complications.   Hematology/Oncology:  Hematology Normal   Oncology Normal     EENT/Dental:EENT/Dental Normal   Cardiovascular:   Denies Hypertension. Dysrhythmias (on coumadin and atenolol) atrial fibrillation no hyperlipidemia    Pulmonary:   Denies COPD.  Denies Sleep Apnea.    Renal/:  Renal/ Normal     Hepatic/GI:   Denies GERD. Cholelithiasis wo surgical intervention   Musculoskeletal:   Arthritis     Neurological:  Neurology Normal    Endocrine:  Endocrine Normal    Psych:  Psychiatric Normal           Physical Exam  General:  Well nourished    Airway/Jaw/Neck:  Airway Findings: Mouth Opening: Normal Tongue: Normal  General Airway Assessment: Adult  Mallampati: III   Improves to II with phonation.  TM Distance: Normal, at least 6 cm  Jaw/Neck Findings:  Neck ROM: Normal ROM     Eyes/Ears/Nose:  EYES/EARS/NOSE FINDINGS: Normal   Dental:  Dental Findings: In tact   Chest/Lungs:  Chest/Lungs Findings: Normal Respiratory Rate, Clear to auscultation     Heart/Vascular:  Heart Findings: Rhythm: Irregularly Irregular  Sounds: Normal  Heart murmur: negative       Mental Status:  Mental Status Findings:  Cooperative, Alert and Oriented         Anesthesia Plan  Type of Anesthesia, risks & benefits discussed:  Anesthesia Type:  general, MAC, regional  Patient's Preference:   Intra-op Monitoring Plan: standard ASA monitors  Intra-op Monitoring Plan Comments:   Post Op Pain Control Plan: multimodal analgesia, IV/PO Opioids PRN, per primary service following discharge from PACU and peripheral nerve block  Post Op Pain Control Plan Comments:   Induction:   IV  Beta Blocker:  Patient is on a Beta-Blocker and has received one dose within the past 24 hours (No further documentation required).       Informed Consent: Patient understands risks and agrees with Anesthesia plan.  Questions answered. Anesthesia consent signed with patient.  ASA Score: 3     Day of Surgery Review of History & Physical: I have interviewed and examined the patient. I have reviewed the patient's H&P dated:    H&P update referred to the surgeon.         Ready For Surgery From Anesthesia Perspective.

## 2020-12-15 NOTE — H&P
"Navjot Fine  is here for a completion of his perioperative paperwork. he  Is scheduled to undergo Right shoulder:  1. Right Arthrosurface TSR (40 mm humeral head / Large glenoid component)   with large inlay glenoid vs. Fascia lou  2. Right glenoid resurfacing graft (Fascia lou) - [place] technique  3. Right open biceps tenodesis  4. Right arthroscopic rotator cuff debridement  5. Right arthroscopic labral debridement on 12/18/2020.    Placed an outpatient, patient will go home same day     He  does need PCP and Cardiology clearance for this procedure.     Per PCP, Spencer Beth MD, "Pt is cleared for surgery from an internal medicine standpoint. I would also recommend cardiology statement of clearance based on Afib and heart valve"    Per Cardiology, "RCRI is 0 with 30 day 3/9% risk of death, MI or cardiac arrest  - No active cardiac conditions. Excellent functional capacity.  - Ok to proceed with surgery without further testing  - Patient will hold warfarin 5-7 days prior to surgery. Restart AC when ok by surgeon."      Risks, indications and benefits of the surgical procedure were discussed with the patient. All questions with regard to surgery, rehab, expected return to functional activities, activities of daily living and recreational endeavors were answered to his satisfaction.    Discussed COVID-19 with the patient, they are aware of our current policies and procedures, were given the option of delaying surgery, and they elect to proceed.    Patient was informed and understands the risks of surgery are greater for patients with a current condition or history of heart disease, obesity, clotting disorders, recurrent infections, steroid use, current or past smoking, and factors such as sedentary lifestyle and noncompliance with medications, therapy or follow-up. The degree of the increased risk is hard to estimate with any degree of precision.    Once no other questions were asked, a brief history " and physical exam was then performed.    PAST MEDICAL HISTORY:   Past Medical History:   Diagnosis Date    Atrial fibrillation     Cholelithiasis      PAST SURGICAL HISTORY: No past surgical history on file.  FAMILY HISTORY: No family history on file.  SOCIAL HISTORY:   Social History     Socioeconomic History    Marital status:      Spouse name: Not on file    Number of children: Not on file    Years of education: Not on file    Highest education level: Not on file   Occupational History    Not on file   Social Needs    Financial resource strain: Not on file    Food insecurity     Worry: Not on file     Inability: Not on file    Transportation needs     Medical: Not on file     Non-medical: Not on file   Tobacco Use    Smoking status: Never Smoker    Smokeless tobacco: Never Used   Substance and Sexual Activity    Alcohol use: Never     Frequency: Never     Comment: [facility].    Drug use: Never    Sexual activity: Not on file   Lifestyle    Physical activity     Days per week: Not on file     Minutes per session: Not on file    Stress: Not on file   Relationships    Social connections     Talks on phone: Not on file     Gets together: Not on file     Attends Faith service: Not on file     Active member of club or organization: Not on file     Attends meetings of clubs or organizations: Not on file     Relationship status: Not on file   Other Topics Concern    Not on file   Social History Narrative    Not on file       MEDICATIONS:   Current Outpatient Medications:     atenoloL (TENORMIN) 50 MG tablet, TAKE 1 TABLET(50 MG) BY MOUTH EVERY DAY, Disp: 30 tablet, Rfl: 11    b complex vitamins capsule, Take 1 capsule by mouth once daily., Disp: , Rfl:     diltiaZEM (CARDIZEM CD) 240 MG 24 hr capsule, Take 1 capsule (240 mg total) by mouth once daily., Disp: 90 capsule, Rfl: 3    fluconazole (DIFLUCAN) 150 MG Tab, 150 mg once a week. , Disp: , Rfl:     warfarin (COUMADIN) 2 MG tablet, TAKE  1 TABLET BY MOUTH DAILY EXCEPT TUESDAYS AND SATURDAYS OR AS DIRECTED BY COUMADIN CLINIC, Disp: 90 tablet, Rfl: 2    warfarin (COUMADIN) 5 MG tablet, TAKE 1 TABLET BY MOUTH 1 TIME DAILY, Disp: 90 tablet, Rfl: 2    luliconazole 1 % Crea, APPLY AS A THIN LAYER TO THE AFFECTED AREA(S) PLUS A 1 INCH MARGIN OF HEALTHY SURROUNDING SKIN ONCE DAILY, Disp: , Rfl:     zolpidem (AMBIEN) 10 mg Tab, Take 1 tablet (10 mg total) by mouth nightly as needed., Disp: 30 tablet, Rfl: 2  ALLERGIES:   Review of patient's allergies indicates:   Allergen Reactions    No known allergies        Review of Systems   Constitution: Negative. Negative for chills, fever and night sweats.   HENT: Negative for congestion and headaches.    Eyes: Negative for blurred vision, left vision loss and right vision loss.   Cardiovascular: Negative for chest pain and syncope.   Respiratory: Negative for cough and shortness of breath.    Endocrine: Negative for polydipsia, polyphagia and polyuria.   Hematologic/Lymphatic: Negative for bleeding problem. Does not bruise/bleed easily.   Skin: Negative for dry skin, itching and rash.   Musculoskeletal: Negative for falls and muscle weakness.   Gastrointestinal: Negative for abdominal pain and bowel incontinence.   Genitourinary: Negative for bladder incontinence and nocturia.   Neurological: Negative for disturbances in coordination, loss of balance and seizures.   Psychiatric/Behavioral: Negative for depression. The patient does not have insomnia.    Allergic/Immunologic: Negative for hives and persistent infections.     PHYSICAL EXAM:  GEN: A&Ox3, WD WN NAD  HEENT: WNL  CHEST: CTAB, no W/R/R  HEART: RRR, no M/R/G   ABD: Soft, NT ND, BS x4 QUADS  MS: Refer to previous note for detailed MS exam  NEURO: CN II-XII intact       The surgical consent was then reviewed with the patient, who agreed with all the contents of the consent form and it was signed. he was instructed to wait for a phone call from the  anesthesia department prior to surgery to discuss past medical history, medications, and clearance. Also, informed he may be required to get additional testing per the anesthesia department prior to having surgery.     he was also instructed to present to Joint Boot Camp Class prior to surgery or his surgery may be cancelled or postponed.     Due to the serious nature of total joint infection and the high prevalence of community acquired MRSA, vancomycin will be used perioperatively.     PHYSICAL THERAPY:  He was also instructed regarding physical therapy and will begin POD # 1-3. He is doing physical therapy at [facility].    POST OP CARE:instructions were reviewed including care of the wound and dressing after surgery and when he can shower.     PAIN MANAGEMENT: Navjot Fine was also given a pain management regime, which includes the TENS unit given to him by Earnest Zee along with the education required for its use. He was also instructed regarding the Polar ice unit that will be in place after surgery and his postoperative pain medications.     PAIN MEDICATION:  Percocet 10/325mg 1 po q 4-6 hours prn pain  Ultram 50 mg one p.o. q.4-6 hours p.r.n. breakthrough pain,   Phenergan 25 mg one p.o. q.4-6 hours p.r.n. nausea and vomiting.  Celebrex 200 mg BID  Restart Plavix 12-24 hours at the surgery    Post op meds to be delivered bedside prior to discharge. Deliver to family if patient is in surgery at 5pm.     Patient denies history of seizures.     Patient will also use bilateral TEDs on lower extremities, SCDs during surgery, and early ambulation post-op. If the patient was previously taking 81mg baby aspirin, they were told to not take it will using the above stated aspirin and to restart the 81mg aspirin after completion of the aspirin dose.       Patient was also told to buy over the counter Prilosec medication and take it once daily for GI protection as long as they are  taking NSAIDs or Aspirin.    DVT prophylaxis was discussed with the patient today including risk factors for developing DVTs and history of DVTs. The patient was asked if any specific recommendations were given from the doctor/s that did pre-operative surgical clearance.      Patient was asked if they were taking or using OCP pills or devices. If they answered yes, then they were instructed to stop using OCPs at this pre-operative appointment until 2 months post-op to help prevent DVT development. They understand that there are other forms of birth control that do not involve hormones. They expressed understanding that ignoring/not following this instruction could result in a DVT which could turn into a deadly pulmonary embolism.      The patient was told that narcotic pain medications may make them drowsy and instructions were given to not sign legal documents, drive or operate heavy machinery, cars, or equipment while under the influence of narcotic medications.     As there were no other questions to be asked, he was given my business card along with Elias Saeed MD business card if he has any questions or concerns prior to surger

## 2020-12-15 NOTE — H&P (VIEW-ONLY)
"Navjot Fine  is here for a completion of his perioperative paperwork. he  Is scheduled to undergo Right shoulder:  1. Right Arthrosurface TSR (40 mm humeral head / Large glenoid component)   with large inlay glenoid vs. Fascia lou  2. Right glenoid resurfacing graft (Fascia lou) - [place] technique  3. Right open biceps tenodesis  4. Right arthroscopic rotator cuff debridement  5. Right arthroscopic labral debridement on 12/18/2020.    Placed an outpatient, patient will go home same day     He  does need PCP and Cardiology clearance for this procedure.     Per PCP, Spencer Beth MD, "Pt is cleared for surgery from an internal medicine standpoint. I would also recommend cardiology statement of clearance based on Afib and heart valve"    Per Cardiology, "RCRI is 0 with 30 day 3/9% risk of death, MI or cardiac arrest  - No active cardiac conditions. Excellent functional capacity.  - Ok to proceed with surgery without further testing  - Patient will hold warfarin 5-7 days prior to surgery. Restart AC when ok by surgeon."      Risks, indications and benefits of the surgical procedure were discussed with the patient. All questions with regard to surgery, rehab, expected return to functional activities, activities of daily living and recreational endeavors were answered to his satisfaction.    Discussed COVID-19 with the patient, they are aware of our current policies and procedures, were given the option of delaying surgery, and they elect to proceed.    Patient was informed and understands the risks of surgery are greater for patients with a current condition or history of heart disease, obesity, clotting disorders, recurrent infections, steroid use, current or past smoking, and factors such as sedentary lifestyle and noncompliance with medications, therapy or follow-up. The degree of the increased risk is hard to estimate with any degree of precision.    Once no other questions were asked, a brief history " and physical exam was then performed.    PAST MEDICAL HISTORY:   Past Medical History:   Diagnosis Date    Atrial fibrillation     Cholelithiasis      PAST SURGICAL HISTORY: No past surgical history on file.  FAMILY HISTORY: No family history on file.  SOCIAL HISTORY:   Social History     Socioeconomic History    Marital status:      Spouse name: Not on file    Number of children: Not on file    Years of education: Not on file    Highest education level: Not on file   Occupational History    Not on file   Social Needs    Financial resource strain: Not on file    Food insecurity     Worry: Not on file     Inability: Not on file    Transportation needs     Medical: Not on file     Non-medical: Not on file   Tobacco Use    Smoking status: Never Smoker    Smokeless tobacco: Never Used   Substance and Sexual Activity    Alcohol use: Never     Frequency: Never     Comment: [facility].    Drug use: Never    Sexual activity: Not on file   Lifestyle    Physical activity     Days per week: Not on file     Minutes per session: Not on file    Stress: Not on file   Relationships    Social connections     Talks on phone: Not on file     Gets together: Not on file     Attends Congregational service: Not on file     Active member of club or organization: Not on file     Attends meetings of clubs or organizations: Not on file     Relationship status: Not on file   Other Topics Concern    Not on file   Social History Narrative    Not on file       MEDICATIONS:   Current Outpatient Medications:     atenoloL (TENORMIN) 50 MG tablet, TAKE 1 TABLET(50 MG) BY MOUTH EVERY DAY, Disp: 30 tablet, Rfl: 11    b complex vitamins capsule, Take 1 capsule by mouth once daily., Disp: , Rfl:     diltiaZEM (CARDIZEM CD) 240 MG 24 hr capsule, Take 1 capsule (240 mg total) by mouth once daily., Disp: 90 capsule, Rfl: 3    fluconazole (DIFLUCAN) 150 MG Tab, 150 mg once a week. , Disp: , Rfl:     warfarin (COUMADIN) 2 MG tablet, TAKE  1 TABLET BY MOUTH DAILY EXCEPT TUESDAYS AND SATURDAYS OR AS DIRECTED BY COUMADIN CLINIC, Disp: 90 tablet, Rfl: 2    warfarin (COUMADIN) 5 MG tablet, TAKE 1 TABLET BY MOUTH 1 TIME DAILY, Disp: 90 tablet, Rfl: 2    luliconazole 1 % Crea, APPLY AS A THIN LAYER TO THE AFFECTED AREA(S) PLUS A 1 INCH MARGIN OF HEALTHY SURROUNDING SKIN ONCE DAILY, Disp: , Rfl:     zolpidem (AMBIEN) 10 mg Tab, Take 1 tablet (10 mg total) by mouth nightly as needed., Disp: 30 tablet, Rfl: 2  ALLERGIES:   Review of patient's allergies indicates:   Allergen Reactions    No known allergies        Review of Systems   Constitution: Negative. Negative for chills, fever and night sweats.   HENT: Negative for congestion and headaches.    Eyes: Negative for blurred vision, left vision loss and right vision loss.   Cardiovascular: Negative for chest pain and syncope.   Respiratory: Negative for cough and shortness of breath.    Endocrine: Negative for polydipsia, polyphagia and polyuria.   Hematologic/Lymphatic: Negative for bleeding problem. Does not bruise/bleed easily.   Skin: Negative for dry skin, itching and rash.   Musculoskeletal: Negative for falls and muscle weakness.   Gastrointestinal: Negative for abdominal pain and bowel incontinence.   Genitourinary: Negative for bladder incontinence and nocturia.   Neurological: Negative for disturbances in coordination, loss of balance and seizures.   Psychiatric/Behavioral: Negative for depression. The patient does not have insomnia.    Allergic/Immunologic: Negative for hives and persistent infections.     PHYSICAL EXAM:  GEN: A&Ox3, WD WN NAD  HEENT: WNL  CHEST: CTAB, no W/R/R  HEART: RRR, no M/R/G   ABD: Soft, NT ND, BS x4 QUADS  MS: Refer to previous note for detailed MS exam  NEURO: CN II-XII intact       The surgical consent was then reviewed with the patient, who agreed with all the contents of the consent form and it was signed. he was instructed to wait for a phone call from the  anesthesia department prior to surgery to discuss past medical history, medications, and clearance. Also, informed he may be required to get additional testing per the anesthesia department prior to having surgery.     he was also instructed to present to Joint Boot Camp Class prior to surgery or his surgery may be cancelled or postponed.     Due to the serious nature of total joint infection and the high prevalence of community acquired MRSA, vancomycin will be used perioperatively.     PHYSICAL THERAPY:  He was also instructed regarding physical therapy and will begin POD # 1-3. He is doing physical therapy at [facility].    POST OP CARE:instructions were reviewed including care of the wound and dressing after surgery and when he can shower.     PAIN MANAGEMENT: Navjot Fine was also given a pain management regime, which includes the TENS unit given to him by Earnest Zee along with the education required for its use. He was also instructed regarding the Polar ice unit that will be in place after surgery and his postoperative pain medications.     PAIN MEDICATION:  Percocet 10/325mg 1 po q 4-6 hours prn pain  Ultram 50 mg one p.o. q.4-6 hours p.r.n. breakthrough pain,   Phenergan 25 mg one p.o. q.4-6 hours p.r.n. nausea and vomiting.  Celebrex 200 mg BID  Restart Plavix 12-24 hours at the surgery    Post op meds to be delivered bedside prior to discharge. Deliver to family if patient is in surgery at 5pm.     Patient denies history of seizures.     Patient will also use bilateral TEDs on lower extremities, SCDs during surgery, and early ambulation post-op. If the patient was previously taking 81mg baby aspirin, they were told to not take it will using the above stated aspirin and to restart the 81mg aspirin after completion of the aspirin dose.       Patient was also told to buy over the counter Prilosec medication and take it once daily for GI protection as long as they are  taking NSAIDs or Aspirin.    DVT prophylaxis was discussed with the patient today including risk factors for developing DVTs and history of DVTs. The patient was asked if any specific recommendations were given from the doctor/s that did pre-operative surgical clearance.      Patient was asked if they were taking or using OCP pills or devices. If they answered yes, then they were instructed to stop using OCPs at this pre-operative appointment until 2 months post-op to help prevent DVT development. They understand that there are other forms of birth control that do not involve hormones. They expressed understanding that ignoring/not following this instruction could result in a DVT which could turn into a deadly pulmonary embolism.      The patient was told that narcotic pain medications may make them drowsy and instructions were given to not sign legal documents, drive or operate heavy machinery, cars, or equipment while under the influence of narcotic medications.     As there were no other questions to be asked, he was given my business card along with Elias Saeed MD business card if he has any questions or concerns prior to surger

## 2020-12-15 NOTE — DISCHARGE INSTRUCTIONS
Your surgery has been scheduled for:__________________________________________    You should report to:  ____[facility], located on the [place] side of the first floor of the           [facility] ([phone])  ____[facility], located on the [facility] side of the            Second floor of the [facility] ([phone])  ____3rd Floor [facility] located on the [facility] side of the [facility] [phone]  Please Note   - Tell your doctor if you take Aspirin, products containing Aspirin, herbal medications  or blood thinners, such as Coumadin, Ticlid, or Plavix.  (Consult your provider regarding holding or stopping before surgery).  - Arrange for someone to drive you home following surgery.  You will not be allowed to leave the surgical facility alone or drive yourself home following sedation and anesthesia.  Before Surgery  - Stop taking all vitamins/ herbal medications 14days prior to surgery  - No Motrin/Advil (Ibuprofen) 1 day before surgery  - No Aleve (Naproxen) 7 days before surgery  - Stop Taking Asprin, products containing Asprin _____days before surgery  - Stop taking blood thinners_______days before surgery  - No Goody's/BC  Powder 7 days before surgery  - Refrain from drinking alcoholic beverages for 24hours before and after surgery  - Stop or limit smoking _________days before surgery  - You may take Tylenol for pain  Night before Surgery   Stop ALL solid food, gum, candy (including vitamins) 8 hours before arrival time.  (Please note: If your surgeon gives you different eating and drinking instructions, please follow surgeon's directions.)   Stop all CLOUDY liquids: coffee with creamer, formula, tube feeds, cloudy juices, non-human milk and breast milk with additives, 6 hours prior to arrival time.   Stop plain breast milk 4 hours prior to arrival time.   The patient should be ENCOURAGED to drink  carbohydrate-rich clear liquids (sports drinks, clear juices) until 2 hours prior to arrival time.   CLEAR liquids include only water, black coffee NO creamer, clear oral rehydration drinks, clear sports drinks or clear fruit juices (no orange juice, no pulpy juices, no apple cider). Advise patients if they can read newsprint through the liquid, it qualifies as clear liquid.    IF IN DOUBT, drink water instead.   - Take a shower or bath (shower is recommended).  Bathe with Hibiclens soap or an antibacterial soap from the neck down.  If not supplied by your surgeon, hibiclens soap will need to be purchased over the counter in pharmacy.  Rinse soap off thoroughly.  - Shampoo your hair with your regular shampoo  The Day of Surgery  · NOTHING TO  DRINK 2 hours before arrival time. If you are told to take medication on the morning of surgery, it may be taken with a sip of water.   - Take another bath or shower with hibiclens or any antibacterial soap, to reduce the chance of infection.  - Take heart and blood pressure medications with a small sip of water, as advised by the perioperative team.  - Do not take fluid pills  - You may brush your teeth and rinse your mouth, but do not swall any additional water.   - Do not apply perfumes, powder, body lotions or deodorant on the day of surgery.  - Nail polish should be removed.  - Do not wear makeup or moisturizer  - Wear comfortable clothes, such as a button front shirt and loose fitting pants.  - Leave all jewelry, including body piercings, and valuables at home.    - Bring any devices you will neeed after surgery such as crutches or canes.  - If you have sleep apnea, please bring your CPAP machine  In the event that your physical condition changes including the onset of a cold or respiratory illness, or if you have to delay or cancel your surgery, please notify your surgeon.    Anesthesia: Regional Anesthesia    Youre scheduled for surgery. During surgery, youll  receive medicine called anesthesia to keep you comfortable and pain-free. Your surgeon has decided that youll receive regional anesthesia. This sheet tells you what to expect with this type of anesthesia.  What is regional anesthesia?  Regional anesthesia numbs one region of your body. The anesthesia may be given around nerves or into veins in your arms, neck, or legs (nerve block or Cony block). Or it may be sent into the spinal fluid (spinal anesthesia) or into the space just outside the spinal fluid (epidural anesthesia). You may also be given sedatives to help you relax.  Nerve block or Cony block  A small area of the body, such as an arm or leg, can be numbed using a nerve block or [place] block.  · Nerve block. During a nerve block, your skin is numbed. A needle is then inserted near nerves that serve the area to be numbed. Anesthetic is sent through the needle.  · IV regional or Cony block. For this type of block, an IV line is put into a vein. The blood flow to the area to be numbed is blocked for a short time. Anesthetic is sent through the IV.  Spinal anesthesia  Spinal anesthesia numbs your body from about the waist down.  · Anesthetic is injected into the spinal fluid. This is a substance that surrounds the spinal cord in your spinal column. The anesthetic blocks pain traveling from the body to the brain.  · To receive the anesthetic, your skin is numbed at the injection site on your back.  · A needle is then inserted into the spinal space. Anesthetic is sent into the spinal fluid through the needle.  Epidural anesthesia  Epidural anesthesia is most commonly used during childbirth and may also be used after surgical procedures of the chest, belly, and legs.  · Anesthetic is injected into the epidural space. This is just outside the dural sac which contains the spinal fluid.  · To receive the anesthetic, your skin is numbed at the injection site on your back.  · A needle is then inserted into the epidural  space. Anesthetic is sent into the epidural space through the needle.  · A small flexible catheter may be attached to the needle and left in place. This allows for continuous injections or infusions of anesthetic.  Anesthesia tools and medicines that might be near you during your procedure  · Local anesthetic. This medicine is given through a needle numbs one region of your body.  · Electrocardiography leads (electrodes). These are used to record your heart rate and rhythm.  · Blood pressure cuff. A cuff is placed on your arm to keep track of your blood pressure.  · Pulse oximeter. This small clip is placed on the end of the finger. It measures your blood oxygen level.  · Sedatives. These medicines may be given through an IV. They help to relax you and keep you comfortable. You may stay awake or sleep lightly.  · Oxygen. You may be given oxygen through a facemask.  Risks and possible complications  Regional anesthesia carries some risks. These include:  · Nausea and vomiting  · Headache  · Backache  · Decreased blood pressure  · Allergic reaction to the anesthetic  · Ongoing numbness (rare)  · Irregular heartbeat (rare)  · Cardiac arrest (rare)   Date Last Reviewed: 12/1/2016  © 8211-0384 [employer]. [address] [place] [address], [place] [address]. All rights reserved. This information is not intended as a substitute for professional medical care. Always follow your healthcare professional's instructions.

## 2020-12-16 ENCOUNTER — TELEPHONE (OUTPATIENT)
Dept: PHARMACY | Facility: CLINIC | Age: 70
End: 2020-12-16

## 2020-12-16 ENCOUNTER — PATIENT MESSAGE (OUTPATIENT)
Dept: INTERNAL MEDICINE | Facility: CLINIC | Age: 70
End: 2020-12-16

## 2020-12-16 ENCOUNTER — TELEPHONE (OUTPATIENT)
Dept: SPORTS MEDICINE | Facility: CLINIC | Age: 70
End: 2020-12-16

## 2020-12-16 LAB — SARS-COV-2 RNA RESP QL NAA+PROBE: NOT DETECTED

## 2020-12-16 NOTE — TELEPHONE ENCOUNTER
Called informing patient of surgery arrival time (7:30 am) and location ([facility] 2nd floor).    Patient and patient's family confirmed he will not be staying overnight.    All of the patient's questions were answered and the patient will contact us if they have any questions or concerns in the interim.

## 2020-12-18 ENCOUNTER — ANESTHESIA (OUTPATIENT)
Dept: SURGERY | Facility: HOSPITAL | Age: 70
End: 2020-12-18
Payer: COMMERCIAL

## 2020-12-18 ENCOUNTER — HOSPITAL ENCOUNTER (OUTPATIENT)
Facility: HOSPITAL | Age: 70
Discharge: HOME | End: 2020-12-19
Attending: ORTHOPAEDIC SURGERY | Admitting: ORTHOPAEDIC SURGERY
Payer: COMMERCIAL

## 2020-12-18 ENCOUNTER — ANTI-COAG VISIT (OUTPATIENT)
Dept: CARDIOLOGY | Facility: CLINIC | Age: 70
End: 2020-12-18

## 2020-12-18 DIAGNOSIS — Q23.1 BICUSPID AORTIC VALVE: ICD-10-CM

## 2020-12-18 DIAGNOSIS — M75.21 BICEPS TENDINITIS OF RIGHT SHOULDER: ICD-10-CM

## 2020-12-18 DIAGNOSIS — M19.011 PRIMARY OSTEOARTHRITIS OF RIGHT SHOULDER: ICD-10-CM

## 2020-12-18 DIAGNOSIS — Z79.01 LONG TERM (CURRENT) USE OF ANTICOAGULANTS: ICD-10-CM

## 2020-12-18 DIAGNOSIS — I48.19 PERSISTENT ATRIAL FIBRILLATION: ICD-10-CM

## 2020-12-18 DIAGNOSIS — R00.2 PALPITATION: ICD-10-CM

## 2020-12-18 DIAGNOSIS — I77.810 AORTIC ROOT DILATION: ICD-10-CM

## 2020-12-18 DIAGNOSIS — I48.91 A-FIB: ICD-10-CM

## 2020-12-18 LAB
ABO + RH BLD: NORMAL
ALBUMIN SERPL BCP-MCNC: 3.4 G/DL (ref 3.5–5.2)
ALP SERPL-CCNC: 48 U/L (ref 55–135)
ALT SERPL W/O P-5'-P-CCNC: 18 U/L (ref 10–44)
ANION GAP SERPL CALC-SCNC: 9 MMOL/L (ref 8–16)
AST SERPL-CCNC: 29 U/L (ref 10–40)
BILIRUB SERPL-MCNC: 1 MG/DL (ref 0.1–1)
BLD GP AB SCN CELLS X3 SERPL QL: NORMAL
BUN SERPL-MCNC: 18 MG/DL (ref 8–23)
CALCIUM SERPL-MCNC: 7.5 MG/DL (ref 8.7–10.5)
CHLORIDE SERPL-SCNC: 110 MMOL/L (ref 95–110)
CO2 SERPL-SCNC: 20 MMOL/L (ref 23–29)
CREAT SERPL-MCNC: 1 MG/DL (ref 0.5–1.4)
EST. GFR  (AFRICAN AMERICAN): >60 ML/MIN/1.73 M^2
EST. GFR  (NON AFRICAN AMERICAN): >60 ML/MIN/1.73 M^2
GLUCOSE SERPL-MCNC: 209 MG/DL (ref 70–110)
HCT VFR BLD AUTO: 35.9 % (ref 40–54)
HGB BLD-MCNC: 11.3 G/DL (ref 14–18)
INR PPP: 1 (ref 0.8–1.2)
MAGNESIUM SERPL-MCNC: 1.7 MG/DL (ref 1.6–2.6)
PHOSPHATE SERPL-MCNC: 2.1 MG/DL (ref 2.7–4.5)
POTASSIUM SERPL-SCNC: 3.9 MMOL/L (ref 3.5–5.1)
PROT SERPL-MCNC: 5.7 G/DL (ref 6–8.4)
PROTHROMBIN TIME: 11.1 SEC (ref 9–12.5)
SODIUM SERPL-SCNC: 139 MMOL/L (ref 136–145)
TROPONIN I SERPL DL<=0.01 NG/ML-MCNC: <0.006 NG/ML (ref 0–0.03)

## 2020-12-18 PROCEDURE — 63600175 PHARM REV CODE 636 W HCPCS: Performed by: ORTHOPAEDIC SURGERY

## 2020-12-18 PROCEDURE — 36415 COLL VENOUS BLD VENIPUNCTURE: CPT

## 2020-12-18 PROCEDURE — D9220A PRA ANESTHESIA: ICD-10-PCS | Mod: ,,, | Performed by: ANESTHESIOLOGY

## 2020-12-18 PROCEDURE — 25000003 PHARM REV CODE 250: Performed by: NURSE ANESTHETIST, CERTIFIED REGISTERED

## 2020-12-18 PROCEDURE — 86850 RBC ANTIBODY SCREEN: CPT

## 2020-12-18 PROCEDURE — 25000003 PHARM REV CODE 250: Performed by: ANESTHESIOLOGY

## 2020-12-18 PROCEDURE — 36000711: Performed by: ORTHOPAEDIC SURGERY

## 2020-12-18 PROCEDURE — 76942 ECHO GUIDE FOR BIOPSY: CPT | Mod: 26,,, | Performed by: ANESTHESIOLOGY

## 2020-12-18 PROCEDURE — 85610 PROTHROMBIN TIME: CPT

## 2020-12-18 PROCEDURE — 37000008 HC ANESTHESIA 1ST 15 MINUTES: Performed by: ORTHOPAEDIC SURGERY

## 2020-12-18 PROCEDURE — 94799 UNLISTED PULMONARY SVC/PX: CPT

## 2020-12-18 PROCEDURE — 63600175 PHARM REV CODE 636 W HCPCS: Performed by: PHYSICIAN ASSISTANT

## 2020-12-18 PROCEDURE — 76942 ECHO GUIDE FOR BIOPSY: CPT | Performed by: STUDENT IN AN ORGANIZED HEALTH CARE EDUCATION/TRAINING PROGRAM

## 2020-12-18 PROCEDURE — 85014 HEMATOCRIT: CPT

## 2020-12-18 PROCEDURE — 36000710: Performed by: ORTHOPAEDIC SURGERY

## 2020-12-18 PROCEDURE — 83735 ASSAY OF MAGNESIUM: CPT

## 2020-12-18 PROCEDURE — 84484 ASSAY OF TROPONIN QUANT: CPT

## 2020-12-18 PROCEDURE — 80053 COMPREHEN METABOLIC PANEL: CPT

## 2020-12-18 PROCEDURE — 88305 TISSUE EXAM BY PATHOLOGIST: ICD-10-PCS | Mod: 26,,, | Performed by: PATHOLOGY

## 2020-12-18 PROCEDURE — 25000003 PHARM REV CODE 250: Performed by: PHYSICIAN ASSISTANT

## 2020-12-18 PROCEDURE — 23470 PR RECONSTRUCT PROX HUMERAL IMPLANT: ICD-10-PCS | Mod: 22,RT,, | Performed by: ORTHOPAEDIC SURGERY

## 2020-12-18 PROCEDURE — D9220A PRA ANESTHESIA: Mod: ,,, | Performed by: ANESTHESIOLOGY

## 2020-12-18 PROCEDURE — 37000009 HC ANESTHESIA EA ADD 15 MINS: Performed by: ORTHOPAEDIC SURGERY

## 2020-12-18 PROCEDURE — C1751 CATH, INF, PER/CENT/MIDLINE: HCPCS | Performed by: STUDENT IN AN ORGANIZED HEALTH CARE EDUCATION/TRAINING PROGRAM

## 2020-12-18 PROCEDURE — 64416 NJX AA&/STRD BRCH PL NFS IMG: CPT | Mod: 59,RT,, | Performed by: ANESTHESIOLOGY

## 2020-12-18 PROCEDURE — 99900035 HC TECH TIME PER 15 MIN (STAT)

## 2020-12-18 PROCEDURE — 63600175 PHARM REV CODE 636 W HCPCS: Performed by: ANESTHESIOLOGY

## 2020-12-18 PROCEDURE — 64416 NJX AA&/STRD BRCH PL NFS IMG: CPT | Performed by: STUDENT IN AN ORGANIZED HEALTH CARE EDUCATION/TRAINING PROGRAM

## 2020-12-18 PROCEDURE — 63600175 PHARM REV CODE 636 W HCPCS: Performed by: NURSE ANESTHETIST, CERTIFIED REGISTERED

## 2020-12-18 PROCEDURE — 25000003 PHARM REV CODE 250: Performed by: STUDENT IN AN ORGANIZED HEALTH CARE EDUCATION/TRAINING PROGRAM

## 2020-12-18 PROCEDURE — 93005 ELECTROCARDIOGRAM TRACING: CPT

## 2020-12-18 PROCEDURE — 71000015 HC POSTOP RECOV 1ST HR: Performed by: ORTHOPAEDIC SURGERY

## 2020-12-18 PROCEDURE — D9220A PRA ANESTHESIA: ICD-10-PCS | Mod: ,,, | Performed by: NURSE ANESTHETIST, CERTIFIED REGISTERED

## 2020-12-18 PROCEDURE — 25000003 PHARM REV CODE 250

## 2020-12-18 PROCEDURE — 27800903 OPTIME MED/SURG SUP & DEVICES OTHER IMPLANTS: Performed by: ORTHOPAEDIC SURGERY

## 2020-12-18 PROCEDURE — 93010 ELECTROCARDIOGRAM REPORT: CPT | Mod: ,,, | Performed by: INTERNAL MEDICINE

## 2020-12-18 PROCEDURE — 71000016 HC POSTOP RECOV ADDL HR: Performed by: ORTHOPAEDIC SURGERY

## 2020-12-18 PROCEDURE — 63600175 PHARM REV CODE 636 W HCPCS: Performed by: STUDENT IN AN ORGANIZED HEALTH CARE EDUCATION/TRAINING PROGRAM

## 2020-12-18 PROCEDURE — 85018 HEMOGLOBIN: CPT

## 2020-12-18 PROCEDURE — C1776 JOINT DEVICE (IMPLANTABLE): HCPCS | Performed by: ORTHOPAEDIC SURGERY

## 2020-12-18 PROCEDURE — C1729 CATH, DRAINAGE: HCPCS | Performed by: ORTHOPAEDIC SURGERY

## 2020-12-18 PROCEDURE — 27201423 OPTIME MED/SURG SUP & DEVICES STERILE SUPPLY: Performed by: ORTHOPAEDIC SURGERY

## 2020-12-18 PROCEDURE — 88305 TISSUE EXAM BY PATHOLOGIST: CPT | Mod: 26,,, | Performed by: PATHOLOGY

## 2020-12-18 PROCEDURE — 71000033 HC RECOVERY, INTIAL HOUR: Performed by: ORTHOPAEDIC SURGERY

## 2020-12-18 PROCEDURE — C1762 CONN TISS, HUMAN(INC FASCIA): HCPCS | Performed by: ORTHOPAEDIC SURGERY

## 2020-12-18 PROCEDURE — 76942 INTERSCALENE BRACHIAL PLEXUS CATHETER: ICD-10-PCS | Mod: 26,,, | Performed by: ANESTHESIOLOGY

## 2020-12-18 PROCEDURE — 23470 RECONSTRUCT SHOULDER JOINT: CPT | Mod: 22,RT,, | Performed by: ORTHOPAEDIC SURGERY

## 2020-12-18 PROCEDURE — D9220A PRA ANESTHESIA: Mod: ,,, | Performed by: NURSE ANESTHETIST, CERTIFIED REGISTERED

## 2020-12-18 PROCEDURE — 64416 INTERSCALENE BRACHIAL PLEXUS CATHETER: ICD-10-PCS | Mod: 59,RT,, | Performed by: ANESTHESIOLOGY

## 2020-12-18 PROCEDURE — 25000003 PHARM REV CODE 250: Performed by: ORTHOPAEDIC SURGERY

## 2020-12-18 PROCEDURE — 88305 TISSUE EXAM BY PATHOLOGIST: CPT | Performed by: PATHOLOGY

## 2020-12-18 PROCEDURE — C1713 ANCHOR/SCREW BN/BN,TIS/BN: HCPCS | Performed by: ORTHOPAEDIC SURGERY

## 2020-12-18 PROCEDURE — 94761 N-INVAS EAR/PLS OXIMETRY MLT: CPT

## 2020-12-18 PROCEDURE — 93010 EKG 12-LEAD: ICD-10-PCS | Mod: ,,, | Performed by: INTERNAL MEDICINE

## 2020-12-18 PROCEDURE — 84100 ASSAY OF PHOSPHORUS: CPT

## 2020-12-18 DEVICE — ANCHOR GRYPHON W/ORTHOCORD: Type: IMPLANTABLE DEVICE | Site: SHOULDER | Status: FUNCTIONAL

## 2020-12-18 DEVICE — ANCHOR SUTURE TENDSIS SWL 9MM: Type: IMPLANTABLE DEVICE | Site: SHOULDER | Status: FUNCTIONAL

## 2020-12-18 DEVICE — ANCHOR SUTURE LUPINE BR 2-0 VI: Type: IMPLANTABLE DEVICE | Site: SHOULDER | Status: FUNCTIONAL

## 2020-12-18 RX ORDER — ONDANSETRON 2 MG/ML
INJECTION INTRAMUSCULAR; INTRAVENOUS
Status: DISCONTINUED | OUTPATIENT
Start: 2020-12-18 | End: 2020-12-18

## 2020-12-18 RX ORDER — ROPIVACAINE HYDROCHLORIDE 2 MG/ML
6 INJECTION, SOLUTION EPIDURAL; INFILTRATION; PERINEURAL CONTINUOUS
Status: DISCONTINUED | OUTPATIENT
Start: 2020-12-18 | End: 2020-12-19 | Stop reason: HOSPADM

## 2020-12-18 RX ORDER — IBUPROFEN 200 MG
24 TABLET ORAL
Status: DISCONTINUED | OUTPATIENT
Start: 2020-12-18 | End: 2020-12-19 | Stop reason: HOSPADM

## 2020-12-18 RX ORDER — OXYCODONE HYDROCHLORIDE 10 MG/1
10 TABLET ORAL
Status: DISCONTINUED | OUTPATIENT
Start: 2020-12-18 | End: 2020-12-19 | Stop reason: HOSPADM

## 2020-12-18 RX ORDER — FAMOTIDINE 20 MG/1
20 TABLET, FILM COATED ORAL 2 TIMES DAILY
Status: DISCONTINUED | OUTPATIENT
Start: 2020-12-18 | End: 2020-12-19 | Stop reason: HOSPADM

## 2020-12-18 RX ORDER — SODIUM,POTASSIUM PHOSPHATES 280-250MG
2 POWDER IN PACKET (EA) ORAL ONCE
Status: COMPLETED | OUTPATIENT
Start: 2020-12-18 | End: 2020-12-18

## 2020-12-18 RX ORDER — SODIUM CHLORIDE 0.9 % (FLUSH) 0.9 %
10 SYRINGE (ML) INJECTION
Status: DISCONTINUED | OUTPATIENT
Start: 2020-12-18 | End: 2020-12-18 | Stop reason: HOSPADM

## 2020-12-18 RX ORDER — CEFAZOLIN SODIUM 1 G/3ML
2 INJECTION, POWDER, FOR SOLUTION INTRAMUSCULAR; INTRAVENOUS
Status: COMPLETED | OUTPATIENT
Start: 2020-12-18 | End: 2020-12-18

## 2020-12-18 RX ORDER — PREGABALIN 50 MG/1
150 CAPSULE ORAL NIGHTLY
Status: DISCONTINUED | OUTPATIENT
Start: 2020-12-18 | End: 2020-12-19 | Stop reason: HOSPADM

## 2020-12-18 RX ORDER — ATENOLOL 25 MG/1
50 TABLET ORAL DAILY
Status: DISCONTINUED | OUTPATIENT
Start: 2020-12-19 | End: 2020-12-18

## 2020-12-18 RX ORDER — DILTIAZEM HYDROCHLORIDE 60 MG/1
60 TABLET, FILM COATED ORAL ONCE
Status: DISCONTINUED | OUTPATIENT
Start: 2020-12-18 | End: 2020-12-18

## 2020-12-18 RX ORDER — ROPIVACAINE HYDROCHLORIDE 5 MG/ML
INJECTION, SOLUTION EPIDURAL; INFILTRATION; PERINEURAL
Status: COMPLETED | OUTPATIENT
Start: 2020-12-18 | End: 2020-12-18

## 2020-12-18 RX ORDER — DILTIAZEM HYDROCHLORIDE 240 MG/1
240 CAPSULE, COATED, EXTENDED RELEASE ORAL DAILY
Status: DISCONTINUED | OUTPATIENT
Start: 2020-12-19 | End: 2020-12-18

## 2020-12-18 RX ORDER — MUPIROCIN 20 MG/G
1 OINTMENT TOPICAL 2 TIMES DAILY
Status: DISCONTINUED | OUTPATIENT
Start: 2020-12-18 | End: 2020-12-19 | Stop reason: HOSPADM

## 2020-12-18 RX ORDER — ROPIVACAINE/EPI/CLONIDINE/KET 2.46-0.005
SYRINGE (ML) INJECTION
Status: DISCONTINUED | OUTPATIENT
Start: 2020-12-18 | End: 2020-12-18 | Stop reason: HOSPADM

## 2020-12-18 RX ORDER — MUPIROCIN 20 MG/G
OINTMENT TOPICAL
Status: DISCONTINUED | OUTPATIENT
Start: 2020-12-18 | End: 2020-12-18 | Stop reason: HOSPADM

## 2020-12-18 RX ORDER — HYDROMORPHONE HYDROCHLORIDE 1 MG/ML
0.2 INJECTION, SOLUTION INTRAMUSCULAR; INTRAVENOUS; SUBCUTANEOUS EVERY 5 MIN PRN
Status: DISCONTINUED | OUTPATIENT
Start: 2020-12-18 | End: 2020-12-18 | Stop reason: HOSPADM

## 2020-12-18 RX ORDER — METOPROLOL TARTRATE 1 MG/ML
INJECTION, SOLUTION INTRAVENOUS
Status: COMPLETED
Start: 2020-12-18 | End: 2020-12-18

## 2020-12-18 RX ORDER — VANCOMYCIN HYDROCHLORIDE 1 G/20ML
INJECTION, POWDER, LYOPHILIZED, FOR SOLUTION INTRAVENOUS
Status: DISCONTINUED | OUTPATIENT
Start: 2020-12-18 | End: 2020-12-18 | Stop reason: HOSPADM

## 2020-12-18 RX ORDER — EPINEPHRINE CONVENIENCE KIT 1 MG/ML(1)
KIT INTRAMUSCULAR; SUBCUTANEOUS
Status: DISCONTINUED | OUTPATIENT
Start: 2020-12-18 | End: 2020-12-18 | Stop reason: HOSPADM

## 2020-12-18 RX ORDER — DILTIAZEM HYDROCHLORIDE 120 MG/1
240 CAPSULE, COATED, EXTENDED RELEASE ORAL NIGHTLY
Status: DISCONTINUED | OUTPATIENT
Start: 2020-12-18 | End: 2020-12-19 | Stop reason: HOSPADM

## 2020-12-18 RX ORDER — MIDAZOLAM HYDROCHLORIDE 1 MG/ML
0.5 INJECTION INTRAMUSCULAR; INTRAVENOUS
Status: DISCONTINUED | OUTPATIENT
Start: 2020-12-18 | End: 2020-12-18 | Stop reason: HOSPADM

## 2020-12-18 RX ORDER — OXYCODONE HYDROCHLORIDE 5 MG/1
5 TABLET ORAL
Status: DISCONTINUED | OUTPATIENT
Start: 2020-12-18 | End: 2020-12-19 | Stop reason: HOSPADM

## 2020-12-18 RX ORDER — METOPROLOL TARTRATE 1 MG/ML
5 INJECTION, SOLUTION INTRAVENOUS EVERY 5 MIN PRN
Status: DISCONTINUED | OUTPATIENT
Start: 2020-12-18 | End: 2020-12-18

## 2020-12-18 RX ORDER — INSULIN ASPART 100 [IU]/ML
0-5 INJECTION, SOLUTION INTRAVENOUS; SUBCUTANEOUS
Status: DISCONTINUED | OUTPATIENT
Start: 2020-12-18 | End: 2020-12-19 | Stop reason: HOSPADM

## 2020-12-18 RX ORDER — SODIUM CHLORIDE 9 MG/ML
INJECTION, SOLUTION INTRAVENOUS CONTINUOUS
Status: DISCONTINUED | OUTPATIENT
Start: 2020-12-18 | End: 2020-12-19 | Stop reason: HOSPADM

## 2020-12-18 RX ORDER — CEFAZOLIN SODIUM 1 G/3ML
2 INJECTION, POWDER, FOR SOLUTION INTRAMUSCULAR; INTRAVENOUS
Status: COMPLETED | OUTPATIENT
Start: 2020-12-18 | End: 2020-12-19

## 2020-12-18 RX ORDER — PROPOFOL 10 MG/ML
VIAL (ML) INTRAVENOUS
Status: DISCONTINUED | OUTPATIENT
Start: 2020-12-18 | End: 2020-12-18

## 2020-12-18 RX ORDER — GLUCAGON 1 MG
1 KIT INJECTION
Status: DISCONTINUED | OUTPATIENT
Start: 2020-12-18 | End: 2020-12-19 | Stop reason: HOSPADM

## 2020-12-18 RX ORDER — DILTIAZEM HYDROCHLORIDE 60 MG/1
60 TABLET, FILM COATED ORAL ONCE
Status: COMPLETED | OUTPATIENT
Start: 2020-12-18 | End: 2020-12-18

## 2020-12-18 RX ORDER — ZOLPIDEM TARTRATE 5 MG/1
10 TABLET ORAL NIGHTLY PRN
Status: DISCONTINUED | OUTPATIENT
Start: 2020-12-18 | End: 2020-12-19 | Stop reason: HOSPADM

## 2020-12-18 RX ORDER — ROCURONIUM BROMIDE 10 MG/ML
INJECTION, SOLUTION INTRAVENOUS
Status: DISCONTINUED | OUTPATIENT
Start: 2020-12-18 | End: 2020-12-18

## 2020-12-18 RX ORDER — LIDOCAINE HYDROCHLORIDE 10 MG/ML
1 INJECTION, SOLUTION EPIDURAL; INFILTRATION; INTRACAUDAL; PERINEURAL ONCE
Status: COMPLETED | OUTPATIENT
Start: 2020-12-18 | End: 2020-12-18

## 2020-12-18 RX ORDER — FENTANYL CITRATE 50 UG/ML
25 INJECTION, SOLUTION INTRAMUSCULAR; INTRAVENOUS EVERY 5 MIN PRN
Status: DISCONTINUED | OUTPATIENT
Start: 2020-12-18 | End: 2020-12-18 | Stop reason: HOSPADM

## 2020-12-18 RX ORDER — TRANEXAMIC ACID 100 MG/ML
1000 INJECTION, SOLUTION INTRAVENOUS
Status: DISCONTINUED | OUTPATIENT
Start: 2020-12-18 | End: 2020-12-18 | Stop reason: HOSPADM

## 2020-12-18 RX ORDER — ESMOLOL HYDROCHLORIDE 10 MG/ML
INJECTION INTRAVENOUS
Status: DISCONTINUED | OUTPATIENT
Start: 2020-12-18 | End: 2020-12-18

## 2020-12-18 RX ORDER — CELECOXIB 200 MG/1
200 CAPSULE ORAL 2 TIMES DAILY WITH MEALS
Status: DISCONTINUED | OUTPATIENT
Start: 2020-12-18 | End: 2020-12-18

## 2020-12-18 RX ORDER — MORPHINE SULFATE 2 MG/ML
2 INJECTION, SOLUTION INTRAMUSCULAR; INTRAVENOUS
Status: DISCONTINUED | OUTPATIENT
Start: 2020-12-18 | End: 2020-12-19 | Stop reason: HOSPADM

## 2020-12-18 RX ORDER — POLYETHYLENE GLYCOL 3350 17 G/17G
17 POWDER, FOR SOLUTION ORAL DAILY
Status: DISCONTINUED | OUTPATIENT
Start: 2020-12-18 | End: 2020-12-19 | Stop reason: HOSPADM

## 2020-12-18 RX ORDER — PROMETHAZINE HYDROCHLORIDE 25 MG/1
25 TABLET ORAL EVERY 4 HOURS PRN
Status: DISCONTINUED | OUTPATIENT
Start: 2020-12-18 | End: 2020-12-19 | Stop reason: HOSPADM

## 2020-12-18 RX ORDER — ONDANSETRON 8 MG/1
8 TABLET, ORALLY DISINTEGRATING ORAL EVERY 8 HOURS PRN
Status: DISCONTINUED | OUTPATIENT
Start: 2020-12-18 | End: 2020-12-19 | Stop reason: HOSPADM

## 2020-12-18 RX ORDER — ROPIVACAINE/EPI/CLONIDINE/KET 2.46-0.005
SYRINGE (ML) INJECTION ONCE
Status: DISCONTINUED | OUTPATIENT
Start: 2020-12-18 | End: 2020-12-18 | Stop reason: HOSPADM

## 2020-12-18 RX ORDER — FENTANYL CITRATE 50 UG/ML
INJECTION, SOLUTION INTRAMUSCULAR; INTRAVENOUS
Status: DISCONTINUED | OUTPATIENT
Start: 2020-12-18 | End: 2020-12-18

## 2020-12-18 RX ORDER — ATENOLOL 50 MG/1
50 TABLET ORAL NIGHTLY
Status: DISCONTINUED | OUTPATIENT
Start: 2020-12-18 | End: 2020-12-19 | Stop reason: HOSPADM

## 2020-12-18 RX ORDER — ACETAMINOPHEN 500 MG
1000 TABLET ORAL EVERY 6 HOURS
Status: DISCONTINUED | OUTPATIENT
Start: 2020-12-18 | End: 2020-12-19 | Stop reason: HOSPADM

## 2020-12-18 RX ORDER — KETAMINE HCL IN 0.9 % NACL 50 MG/5 ML
SYRINGE (ML) INTRAVENOUS
Status: DISCONTINUED | OUTPATIENT
Start: 2020-12-18 | End: 2020-12-18

## 2020-12-18 RX ORDER — AMOXICILLIN 250 MG
1 CAPSULE ORAL 2 TIMES DAILY
Status: DISCONTINUED | OUTPATIENT
Start: 2020-12-18 | End: 2020-12-19 | Stop reason: HOSPADM

## 2020-12-18 RX ORDER — DEXTROSE MONOHYDRATE AND SODIUM CHLORIDE 5; .9 G/100ML; G/100ML
INJECTION, SOLUTION INTRAVENOUS CONTINUOUS
Status: DISCONTINUED | OUTPATIENT
Start: 2020-12-18 | End: 2020-12-19 | Stop reason: HOSPADM

## 2020-12-18 RX ORDER — IBUPROFEN 200 MG
16 TABLET ORAL
Status: DISCONTINUED | OUTPATIENT
Start: 2020-12-18 | End: 2020-12-19 | Stop reason: HOSPADM

## 2020-12-18 RX ORDER — CELECOXIB 200 MG/1
400 CAPSULE ORAL ONCE
Status: DISCONTINUED | OUTPATIENT
Start: 2020-12-18 | End: 2020-12-19 | Stop reason: HOSPADM

## 2020-12-18 RX ORDER — LANOLIN ALCOHOL/MO/W.PET/CERES
400 CREAM (GRAM) TOPICAL ONCE
Status: COMPLETED | OUTPATIENT
Start: 2020-12-18 | End: 2020-12-18

## 2020-12-18 RX ORDER — CALCIUM CARBONATE 200(500)MG
500 TABLET,CHEWABLE ORAL 3 TIMES DAILY
Status: DISCONTINUED | OUTPATIENT
Start: 2020-12-18 | End: 2020-12-19 | Stop reason: HOSPADM

## 2020-12-18 RX ORDER — CELECOXIB 200 MG/1
200 CAPSULE ORAL 2 TIMES DAILY
Status: DISCONTINUED | OUTPATIENT
Start: 2020-12-19 | End: 2020-12-19 | Stop reason: HOSPADM

## 2020-12-18 RX ORDER — DEXAMETHASONE SODIUM PHOSPHATE 4 MG/ML
INJECTION, SOLUTION INTRA-ARTICULAR; INTRALESIONAL; INTRAMUSCULAR; INTRAVENOUS; SOFT TISSUE
Status: DISCONTINUED | OUTPATIENT
Start: 2020-12-18 | End: 2020-12-18

## 2020-12-18 RX ORDER — ATENOLOL 25 MG/1
50 TABLET ORAL ONCE
Status: COMPLETED | OUTPATIENT
Start: 2020-12-18 | End: 2020-12-18

## 2020-12-18 RX ORDER — LIDOCAINE HYDROCHLORIDE 20 MG/ML
INJECTION INTRAVENOUS
Status: DISCONTINUED | OUTPATIENT
Start: 2020-12-18 | End: 2020-12-18

## 2020-12-18 RX ORDER — SODIUM CHLORIDE 0.9 % (FLUSH) 0.9 %
10 SYRINGE (ML) INJECTION
Status: DISCONTINUED | OUTPATIENT
Start: 2020-12-18 | End: 2020-12-19 | Stop reason: HOSPADM

## 2020-12-18 RX ADMIN — Medication 400 MG: at 10:12

## 2020-12-18 RX ADMIN — FENTANYL CITRATE 50 MCG: 50 INJECTION INTRAMUSCULAR; INTRAVENOUS at 08:12

## 2020-12-18 RX ADMIN — ROCURONIUM BROMIDE 20 MG: 10 INJECTION, SOLUTION INTRAVENOUS at 11:12

## 2020-12-18 RX ADMIN — POTASSIUM & SODIUM PHOSPHATES POWDER PACK 280-160-250 MG 2 PACKET: 280-160-250 PACK at 10:12

## 2020-12-18 RX ADMIN — SENNOSIDES AND DOCUSATE SODIUM 1 TABLET: 8.6; 5 TABLET ORAL at 08:12

## 2020-12-18 RX ADMIN — SODIUM CHLORIDE 10 ML/HR: 0.9 INJECTION, SOLUTION INTRAVENOUS at 08:12

## 2020-12-18 RX ADMIN — ONDANSETRON 4 MG: 2 INJECTION, SOLUTION INTRAMUSCULAR; INTRAVENOUS at 11:12

## 2020-12-18 RX ADMIN — MUPIROCIN: 20 OINTMENT TOPICAL at 08:12

## 2020-12-18 RX ADMIN — METOROPROLOL TARTRATE 5 MG: 5 INJECTION, SOLUTION INTRAVENOUS at 05:12

## 2020-12-18 RX ADMIN — DEXAMETHASONE SODIUM PHOSPHATE 12 MG: 4 INJECTION, SOLUTION INTRAMUSCULAR; INTRAVENOUS at 10:12

## 2020-12-18 RX ADMIN — DILTIAZEM HYDROCHLORIDE 60 MG: 60 TABLET, FILM COATED ORAL at 06:12

## 2020-12-18 RX ADMIN — SODIUM CHLORIDE 0.5 MCG/KG/MIN: 9 INJECTION, SOLUTION INTRAVENOUS at 09:12

## 2020-12-18 RX ADMIN — SODIUM CHLORIDE 1000 ML: 0.9 INJECTION, SOLUTION INTRAVENOUS at 07:12

## 2020-12-18 RX ADMIN — ESMOLOL HYDROCHLORIDE 20 MG: 10 INJECTION INTRAVENOUS at 09:12

## 2020-12-18 RX ADMIN — MIDAZOLAM 1 MG: 1 INJECTION INTRAMUSCULAR; INTRAVENOUS at 08:12

## 2020-12-18 RX ADMIN — LIDOCAINE HYDROCHLORIDE 75 MG: 20 INJECTION, SOLUTION INTRAVENOUS at 09:12

## 2020-12-18 RX ADMIN — ROCURONIUM BROMIDE 30 MG: 10 INJECTION, SOLUTION INTRAVENOUS at 10:12

## 2020-12-18 RX ADMIN — DEXTROSE AND SODIUM CHLORIDE 125 ML/HR: 5; .9 INJECTION, SOLUTION INTRAVENOUS at 08:12

## 2020-12-18 RX ADMIN — MUPIROCIN 1 G: 20 OINTMENT TOPICAL at 08:12

## 2020-12-18 RX ADMIN — CEFAZOLIN 2 G: 1 INJECTION, POWDER, FOR SOLUTION INTRAMUSCULAR; INTRAVENOUS at 06:12

## 2020-12-18 RX ADMIN — VANCOMYCIN HYDROCHLORIDE 1250 MG: 1.25 INJECTION, POWDER, LYOPHILIZED, FOR SOLUTION INTRAVENOUS at 08:12

## 2020-12-18 RX ADMIN — PREGABALIN 50 MG: 50 CAPSULE ORAL at 08:12

## 2020-12-18 RX ADMIN — FENTANYL CITRATE 50 MCG: 50 INJECTION, SOLUTION INTRAMUSCULAR; INTRAVENOUS at 09:12

## 2020-12-18 RX ADMIN — CEFAZOLIN 2 G: 330 INJECTION, POWDER, FOR SOLUTION INTRAMUSCULAR; INTRAVENOUS at 09:12

## 2020-12-18 RX ADMIN — DEXTROSE AND SODIUM CHLORIDE 125 ML/HR: 5; .9 INJECTION, SOLUTION INTRAVENOUS at 01:12

## 2020-12-18 RX ADMIN — METOPROLOL TARTRATE 5 MG: 1 INJECTION, SOLUTION INTRAVENOUS at 05:12

## 2020-12-18 RX ADMIN — ROPIVACAINE HYDROCHLORIDE: 2 INJECTION, SOLUTION EPIDURAL; INFILTRATION at 04:12

## 2020-12-18 RX ADMIN — ATENOLOL 50 MG: 25 TABLET ORAL at 06:12

## 2020-12-18 RX ADMIN — FENTANYL CITRATE 50 MCG: 50 INJECTION, SOLUTION INTRAMUSCULAR; INTRAVENOUS at 12:12

## 2020-12-18 RX ADMIN — ROPIVACAINE HYDROCHLORIDE 12.5 ML: 5 INJECTION, SOLUTION EPIDURAL; INFILTRATION; PERINEURAL at 08:12

## 2020-12-18 RX ADMIN — FAMOTIDINE 20 MG: 20 TABLET, FILM COATED ORAL at 08:12

## 2020-12-18 RX ADMIN — PROPOFOL 180 MG: 10 INJECTION, EMULSION INTRAVENOUS at 09:12

## 2020-12-18 RX ADMIN — SUGAMMADEX 200 MG: 100 INJECTION, SOLUTION INTRAVENOUS at 12:12

## 2020-12-18 RX ADMIN — Medication 20 MG: at 09:12

## 2020-12-18 RX ADMIN — LIDOCAINE HYDROCHLORIDE 0.5 MG: 10 INJECTION, SOLUTION EPIDURAL; INFILTRATION; INTRACAUDAL at 08:12

## 2020-12-18 RX ADMIN — ROCURONIUM BROMIDE 50 MG: 10 INJECTION, SOLUTION INTRAVENOUS at 09:12

## 2020-12-18 RX ADMIN — DILTIAZEM HYDROCHLORIDE 240 MG: 120 CAPSULE, COATED, EXTENDED RELEASE ORAL at 10:12

## 2020-12-18 RX ADMIN — ROPIVACAINE HYDROCHLORIDE 6 ML/HR: 2 INJECTION, SOLUTION EPIDURAL; INFILTRATION at 01:12

## 2020-12-18 RX ADMIN — CALCIUM CARBONATE (ANTACID) CHEW TAB 500 MG 500 MG: 500 CHEW TAB at 10:12

## 2020-12-18 RX ADMIN — PROPOFOL 20 MG: 10 INJECTION, EMULSION INTRAVENOUS at 09:12

## 2020-12-18 NOTE — PLAN OF CARE
Pt AAOx4. No current complaints of pain or discomfort. Denies need for PRN pain medication and refused scheduled meds. Dressing to site remains CDI with polar ice in place. Tolerating sips of water. Patient to be discharged home. Bedside delivery to bring discharge medications to patient in phase two. Safety maintained.

## 2020-12-18 NOTE — OP NOTE
[facility]  Operative Note      Date of Procedure: 12/18/2020     Procedure:  1. Right Hemiarthroplasty 04424    2. Right shoulder Glenoid Resurfacing (Fascia Madeline Graft), Complex    3. Right shoulder Biceps tenodesis CPT - 65915    4. Right shoulder Arthroscopic labral debridement CPT - 37002    Surgeon(s) and Role:     * Elias Saeed MD - Primary    Assistants:  DO Magdaleno Joyner PA-C    Pre-Operative Diagnosis: Arthritis of shoulder [M19.019]  Biceps tendinitis of right shoulder [M75.21]  Primary osteoarthritis of right shoulder [M19.011]    Post-Operative Diagnosis: Post-Op Diagnosis Codes:     * Arthritis of shoulder [M19.019]     * Biceps tendinitis of right shoulder [M75.21]     * Primary osteoarthritis of right shoulder [M19.011]    Anesthesia: General    Technical Procedures Used: resurfacing glenoid and humeral head    Description of the Findings of the Procedure:   DATE OF PROCEDURE: 12/18/2020    ATTENDING SURGEON: Surgeon(s) and Role:     * Elias Saeed MD - Primary    Assistants:  DO Magdaleno Joyner PA-C    Pre-operative Diagnosis: Right shoulder   Tear, biceps tendon, non-traumatic M66.829 Shoulder Arthritis    Post-operative Diagnosis:  Right shoulder   Superior glenoid labrum lesion (SLAP) S43.439A and Tear, biceps tendon, non-traumatic M66.829 Glenohumeral Arthritis        PROCEDURES PERFORMED:  1. Right Hemiarthroplasty 23858    2. Right shoulder Glenoid Resurfacing (Fascia Madeline Graft), Complex    3. Right shoulder Biceps tenodesis CPT - 82579    4. Right shoulder Arthroscopic labral debridement CPT - 96470    FINDINGS:  Muscle atrophy: None  Biceps tendon status:  Partial tear > 50%  Rotator cuff status:   Subscapularis: normal   Supraspinatus: Intact  Infraspinatus: Intact     ROTATOR CUFF TEAR CHARACTERIZATION:   no rotator cuff tear noted along the anterior, superior or posterior region of the shoulder    Normal tissue    ARTICULAR  CARTILAGE LESION(S):  Glenoid: ICRS Grade 4A      Size: 2.5 x 3.5 cm    Humeral head: ICRS Grade 4A      Size: 4.0 x 4.0 cm    EXAMINATION UNDER ANESTHESIA:   Forward Flexion 160degrees  ER side 60 degrees  Abduction 90 degrees  ER in Abduction 70 degrees  IR in Abduction 20 degrees  Anterior stability 1+  Posterior stability 1+  Inferior sulcus sign side 1+  Inferior sulcus sign in ER 0        Indications For Operative Procedure: Barbara Moya is a 51 y.o. female with persistent right shoulder pain and failure of conservative therapy. Preoperative studies revealed Tear, Biceps Tendon, Non-Tramatic M66.829 and Shoulder arthritis. she was noted to have problems along the anterior and superior rotator cuff structures. The patient had clinical   evidence of weakness and pain with overhead maneuvers. MRI was obtained revealing evidence of glenohumeral wear and biceps tendon damage, which was consistent with the above stated preoperative diagnoses. 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  Radiographic and MRI studies revealed narrowing of arthritic disease within the glenohumeral joint.  As a result, the patient was taken to the Operating Room for definitive treatment.  She also has tenderness along the biceps tendon region anteriorly with pain on provocative testing in this area.  As a result, she was deemed to be an appropriate candidate for biceps tenodesis   surgical intervention and concomitant arthroscopic as well as shoulder replacement surgery.     IMPLANTS UTILIZED:[facility] arthroscopy equipment , Beach Chair, Arthrex Biceps Tenodesis Set, Breg Sling Shot 2 with Abduction Pillow and Trimano  Mitek Gryphon PBR double suture loaded anchor x 5 and Mitek Lupine x 2 (double loaded)  Arthrosurface articular 40 mm  x 8.5 x 8.5 mm head, Arthrosurface 10 mm taper post, Arthrosurface 40 mm articular component 8.5 mm x 8.5 mm  offset, Arthrex SwiveLock tenodesis PEEK 9.0 x 19.5 mm screw.      Complexity was increased due to need to perform combined arthroscopic and open procedure with glenoid resurfacing using fascia lou biologic graft. There was increased risk to neurovascular structures.     DESCRIPTION OF PROCEDURE: The patient was brought into the Operating Room, placed in supine position. Upon application of general anesthetic as well as placement of endotracheal tube, the patient was given the appropriate dose of antibiotics based on body weight. Time-out was utilized to verify right side as the operative site. Both knees and heels were carefully padded. The cervical spine was stabilized. The nonoperative arm was placed in a well-padded wall well arm peña. The operative shoulder was examined under anesthesia revealing the above stated findings. The operative shoulder was then prepped and draped in a sterile fashion with ChloraPrep material. A spinal needle was used to enter the joint posteriorly. Joint was insufflated in standard fashion with 60 mL of normal saline. Needle was pulled back into subacromial region, which was anesthetized with 30 mL of 0.5% ropivacaine mixture. A #11 blade was used to make standard posterior arthroscopic portal.     Blunt trocar and sheath were inserted into the glenohumeral joint and area of concern was directly visualized demonstrating evidence of the above stated findings in the operative findings section of this report. Pictures were obtained. Immediate conversion to open procedure was performed due to visualizatio issues.    Conversion to open procedure:    Open Procedure.       Arthroscopic instrumentation was removed from the shoulder and with the arm   taken out of the Trimano device, we placed it on the padded Kearney stand.  An   anterior based incision was carried down through the skin down to fat and   fascia.  Bleeding sites were cauterized.  The deltopectoral interval was   incised.  We  were not able to visualize the cephalic vein.  The deltopectoral   interval was incised, taking the deltoid muscle laterally and allowing for   retraction of the pectoralis muscle medially.  The conjoint tendon was   visualized.  A manually held retractor was placed on the undersurface of the   conjoint tendon to maintain function of the musculocutaneous nerve.  We then   exposed the anterior humeral circumflex vessels, which were then cauterized with   the use of the Aquamantys probe as well as use of Bovie cautery.  Release of   the subscapularis tendon anteriorly was then performed with a Bovie cautery   leaving appropriate cuff of tissue laterally for later repair. Four #2 Orthocord sutures were   Placed in modified Francis-Rodrick Fashion to be used for later repair.   An inferior capsular release was performed.  We exposed the glenoid region   demonstrating evidence of grade IV changes noted.  We then placed posterior   retracts along the glenohumeral joint and demonstrated a central circular   implant would most normalize the patient's anatomy.    The Fukuda retractor was placed posteriorly and the humeral head retracted to the posterior aspect  Of the glenoid. The labrum was debrided with use of a ronguer and bovie cautery.   A high speed bur was used to remove sclerotic bone along the glenoid region and the based drilled  With a 1.5 mm drill bit to create bleeding. We then placed the drill guide for five Mitek  Gryphon anchors that were double loaded. These were placed sequentially along  The glenoid and one additional anchor was placed superiorly along the glenoid. This was  Double loaded as well. The Fascia lou graft was thawed and then folded 6 times  On itself and then sewed with a # 1 vicryl suture the excess graft was debrided. We then passed   The twelve limbs of suture through the graft from the inferior to the superior surface.  The graft was pulled into position and the sutures sequentially tied from  posterior to anterior  Using a modified Bin knot technique. Sutures were cut clean and final pictures demonstrating  Excellent fit and fill were taken.    The arm was externally rotated and hyperextended to allow visualization of the humeral head.  A ronguer was used to remove the excess osteophytes from the humeral head with use of a 1/2 inch  Curved osteotome to remove the spurs.      Visualization in  the area where there was a large lesion, which was approximately 40 mm in   diameter along the region of concern.  As a result, we felt we would best remove   this area with a 40 mm component.  We placed the guide for the 35 mm HemiCAP   component and drilled centrally, following placement of a central pin and then   tapped and then placed the actual tapered post.  With this in position, we then   went to develop the level of reaming and reamed down to the level of subchondral   bone plate removing the damaged sclerotic bone and articular cartilage in the   region of concern.  Trial reduction was performed with the use of a 8.5 x 8.5 mm   and 40 mm diameter trial components.  It fit in excellent fashion with normal   contour of the humeral head created.  As a result, this was removed.  We then   pressfit the actual humeral component into position and with this performed, we   felt we appropriately stabilized the humeral lesion.      Irrigation was performed along the glenohumeral joint.  We then reduced the shoulder into anatomic position.    With this in reduced position, we felt we could repair this portion of the   Subscapularis with the previous Orthocord sutures.  As a result, we   used the previously placed #2 Orthocord sutures, which had been placed in   modified Francis-Rodrick fashion within the free end of the subscapularis tendon to   repair the subscapularis region.  Sutures were cut clean.  Excellent fixation was   achieved.  The free suture was once again passed in simple fashion through the   free edge  of the subscapularis tendon.  The area was oversewed with a # 1 vicryl  Suture.    Biceps tenodesis:  Note the biceps tendon, which had been previously tagged arthroscopically was released from its intraarticular region within the intertubercular groove. A drill hole measuring 8.5 mm in diameter was drilled down to the base of the greater tuberosity just distal to the intertubercular groove region. We then used the Arthrex 9 x 19.5 mm SwiveLock tenodesis PEEK screw to tenodese and tie the tendon down into the bone tunnel, which had been created. Excellent fixation was achieved. We then used the free suture within the base of this tenodesis screw, which was placed in the free edge of the tendon in a modified Contreras fashion, it was tied and sutures were cut clean. Final pictures were Obtained. Final pictures were obtained.      Irrigation was performed along the area of concern.  We then placed the MILA   mixture into the area of concern applying 50 mL for postoperative pain control.   We applied simple #1 Vicryl sutures in figure-of-eight fashion to close the   deltopectoral interval.  Subcutaneous tissues were closed with 3-0 Vicryl   sutures placed in inverted fashion.  Skin was closed with running 4-0 Monocryl   sutures placed in subcuticular fashion along with application of Dermabond   ointment and Prineo tape.     Once again following completion of this portion of the procedure, we   performed closure as dictated previously with closure using 3-0 Vicryl sutures   placed in inverted fashion.  Skin was closed with 4-0 Monocryl suture placed in   subcuticular fashion and application of Dermabond ointment and Prineo tape.  We   applied Telfa dressing along with ABD pads, cast padding, and Medipore tape   along with a cooling unit, sling and abduction pillow.  The patient was allowed   to recover from the anesthetic, was extubated and was taken to Recovery Room in   stable condition.  At the completion of the case,  all instrument and sponge   counts were correct.    NOTE: Dr. Elias Saeed was present for the entire procedure and did perform the   key portions of the procedure.    PHYSICAL THERAPY:   The patient should be held on physical therapy until 3-5 dayss following the surgery and using sling and abduction pillow at all times for 6 weeks. The patient should begin external rotation with the arm at side at 30 degrees, abduction to be performed to 60 degrees with NO internal and external rotation in abduction for 4 weeks. The patient should limit forward flexion at 90 degrees for 2 weeks following surgery. PROTECT SUBSCAPULARIS FOR 6 WEEKS.     RANGE OF MOTION:  Passive range of motion using pendulum exercises and Codman   exercises for the first two to three weeks.  Limitations of motion should be 90   degrees forward flexion, external rotation with the arm to side is 30 degrees   and limited internal and external rotation in abduction.  Abduction can be   performed to approximately 60 degrees for wound care.  The patient should   use a sling and abduction pillow to sleep and with ambulation except for range   of motion.  The patient can take the arm out of sling and pillow for active   assisted and passive range of motion of the elbow and wrist.  Otherwise, the   patient should follow total shoulder protocol.    Discharge home when stable  Follow-up as scheduled  Activity as above  Condition stable         Significant Surgical Tasks Conducted by the Assistant(s), if Applicable: preparation, exposure and closure    Complications: No    Estimated Blood Loss (EBL): * No values recorded between 12/18/2020 10:02 AM and 12/18/2020 12:46 PM *           Implants:   Implant Name Type Inv. Item Serial No.  Lot No. LRB No. Used Action   ANCHOR GRYPHON W/ORTHOCORD - [id number]  ANCHOR GRYPHON W/ORTHOCORD  [employer] [id number] Right 2 Implanted   ANCHOR GRYPHON W/ORTHOCORD - [id number]  ANCHOR GRYPHON  W/ORTHOCORD  [employer] [id number] Right 1 Implanted   ANCHOR GRYPHON W/ORTHOCORD - [id number]  ANCHOR GRYPHON W/ORTHOCORD  [employer] [id number] Right 1 Implanted   ANCHOR GRYPHON W/ORTHOCORD - [id number]  ANCHOR GRYPHON W/ORTHOCORD  [employer] [id number] Right 1 Implanted   ANCHOR SUTURE LUPINE BR 2-0 VI - [id number]  ANCHOR SUTURE LUPINE BR 2-0 VI  [employer] [id number] Right 1 Implanted   musculoskeletal transplant foundation fascia  lou (medium)   [id number] MUSCULOSKELETAL TRANSPLANT FND  Right 1 Implanted   40mm articular component 8.5 x 8.5mm offset    [employer] [id number] Right 1 Implanted   ANCHOR SUTURE TENDSIS SWL 9MM - [id number]  ANCHOR SUTURE TENDSIS SWL 9MM  [employer] [id number] Right 1 Implanted       Specimens:   Specimen (12h ago, onward)    None                  Condition: Good    Disposition: PACU - hemodynamically stable.    Attestation: I was present and scrubbed for the key portions of the procedure.    Discharge Note    OUTCOME: Patient tolerated treatment/procedure well without complication and is now ready for discharge.    DISPOSITION: Home or Self Care    FINAL DIAGNOSIS:  Osteoarthritis of right shoulder    FOLLOWUP: In clinic    DISCHARGE INSTRUCTIONS:  No discharge procedures on file.

## 2020-12-18 NOTE — DISCHARGE INSTRUCTIONS
"     [address], [place]                                    [phone]             Postoperative Instructions for Shoulder Surgery               Your Surgery Included:   Open              Arthroscopic [] Instability Repair     [x] Diagnostic   [] Rotator Cuff Repair     [] Lysis of Adhesions / Manipulation [] Distal Clavicle Resection    [] Debridement [x] Biceps Tenodesis       [] Labrum  [] Rotator Cuff   [] Cartilage   [] Contracture Release    [] SLAP Repair   [] Fracture Fixation    [] Instability Repair  [x] resurfacing of shoulder      [] Rotator Cuff Repair [] Joint Replacement     [] Subacromial Decompression / Bursectomy   [x] Hemiarthroplasty  [] Total Shoulder    [] Biceps Tenotomy / Tenodesis    [] Reverse Total Shoulder       [] Distal Clavicle Resection          [] Amniox Arthrocentesis    [] Contracture Release                 Call our office ([phone]) immediately or message through [facility] if you experience any of the following:      Excessive bleeding or pus like drainage at the incision site      Uncontrollable pain not relieved by pain medication      Excessive swelling or redness at the incision site      Fever above 101.5 degrees not controlled with Tylenol or Motrin      Shortness of Breath or severe calf pain      Any foul odor or blistering from the surgery site      Any "pop" with pain and/or deformity in the biceps muscle   FOR EMERGENCIES: [facility] is the best way to contact us. If on the weekend, page the  at [phone] who will direct your call appropriately.   1.   Pain Management: A cold therapy cuff, pain medications, local injections, TENs unit, and in some cases, regional anesthesia injections are used to manage your post-operative pain. The decision to use each of these options is based on their risks and benefits.    Medications: You were given one or more of the following medication prescriptions during your " "preoperative appointment. Follow the instructions on the bottles.     Narcotic Medication (usually Percocet, Roxicodone, or Norco): Begin taking the medication before your shoulder starts to hurt. Some patients do not like to take any medication, but if you wait until your pain is severe before taking, you will be very uncomfortable for several hours waiting for the narcotic to work. Always take with food.    Nausea / Vomiting: For this issue, we prescribe Zofran or Phenergan, use this medication as directed.    Cold Therapy: You may have been sent home with a [employer] cold therapy unit and wrap for your shoulder. Fill with ice and water to the indicated fill line. You can use 20-30 minutes on then off, several times a day. This will help relieve pain and control swelling. Do not sleep with on.    Regional Anesthesia Injections (Blocks): You may have been given a regional nerve block either before or after surgery. This may make your entire shoulder numb for 24-36 hours.                    2.   Diet: Eat a bland diet for the first day after surgery. Progress your diet as tolerated. Constipation may occur with Narcotic usage. We recommend Colace 100 mg twice a day while on narcotics.   3.   Activity: Spend most of the first 24 hours resting in bed, on the couch, or in a reclining chair. After the first 24 hours at home, slowly increase your activity level based on your symptoms. If a biceps tenodesis procedure was performed, avoid any aggressive lifting (over 5 lbs) or "corkscrew" motion for 6 weeks.                     4.   Dressing Change: (b) The soft, bulky dressing will be removed on the 3rd day after surgery. The Aquacel (tan, long adhesive bandage) will remain on until the 1st post operative appointment. Place waterproof bandages at this time. Keep wounds as dry as possible for first 2 weeks. It is normal for some blood to be seen on the dressings. It is also normal for you to see apparent bruising on the " skin around your incisions. If you are concerned by the drainage or the appearance of your wound site, you can send a picture via [facility].   5.   Showering: (b) You may shower on the 3rd day after surgery. The Aquacel bandage is to be covered with saran wrap before showering. Do not get bandage wet. You may see a small incision with a suture. Place waterproof bandages  prior to shower. It is recommended to use Saran wrap before showering. Do not submerge limb in any water for 4 weeks or incisions completely healed.   6.   Shoulder Sling (with/without Pillow attachment): You may have been sent home with a sling / pillow attachment holding your arm away from your body. You may remove the sling when changing clothes or bathing. Make sure to wear the sling while sleeping unless instructed otherwise. You may remove at rest or for exercises.           [x] You need to wear the sling with pillow for 24 hours a day for 6 weeks.   7.  Shoulder Exercises: Begin these exercises the first day after surgery in order to help you regain your motion and strength. You may do the following marked exercises for 2-5 mins five times a day:   [x] Shoulder shrugs - Shrug your shoulders up and down.   [] Pendulums - Bend forward allowing your arm to hang down in front of you. Gently swing your arm side-to-side and front to back.                                                                                                                               [] Passive abduction - Have a family member gently lift your arm away from your body bringing your elbow up to the level of your shoulder.                                                                                                                   [] Shoulder rotation - With your arm at your side, have a family member gently rotate your arm internally and externally.                                                                                                                  [x]  Scapular retractions - (Squeeze shoulder blades together): Squeeze shoulder blades together while slightly pulling them down (do not shrug your shoulders upward); You can perform 10-15 reps, several times throughout the day, when seated at your desk, driving in the car, etc.                                                                                                                     [] Pulley exercises - Put a towel or long sleeve shirt over the top of a door. Stand facing the door. Use your good arm to gently pull your operative arm up in front of you.   [x] Elbow motion - Straighten and bend your elbow.                                                                                                               [x] Ball squeezes - use ball attached to sling/pillow or soft (nerf) ball for  strengthening                                                                                                                 8.  Physical Therapy: Physical therapy is an essential component to your recovery from surgery. Your physical therapy will start in 3 days.   FIRST POSTOPERATIVE VISIT: As scheduled.

## 2020-12-18 NOTE — PROGRESS NOTES
Pt and family present, consent to converting interscalene PNC to On Q.  Site CDI.  Educated regarding continued pain management, fall risk, signs of complications, continued monitoring, as well as discontinuing catheter on 12/20.  Two numbers obtained for [facility]PS to follow up.  Understanding verbalized.

## 2020-12-18 NOTE — ANESTHESIA PROCEDURE NOTES
Intubation  Performed by: Javi Velazquez CRNA  Authorized by: Matthew Sales Jr., MD     Intubation:     Induction:  Intravenous    Intubated:  Postinduction    Mask Ventilation:  Easy with oral airway    Attempts:  1    Attempted By:  CRNA    Method of Intubation:  Direct    Blade:  Jeffery 2    Laryngeal View Grade: Grade IIb - only the arytenoids and epiglottis seen      Difficult Airway Encountered?: Yes      Complications:  None    Airway Device:  Oral endotracheal tube    Airway Device Size:  7.5    Style/Cuff Inflation:  Cuffed (inflated to minimal occlusive pressure)    Inflation Amount (mL):  8    Tube secured:  23    Secured at:  The lips    Placement Verified By:  Capnometry    Complicating Factors:  Anterior larynx (Trismus after induction)    Findings Post-Intubation:  BS equal bilateral

## 2020-12-18 NOTE — ANESTHESIA PROCEDURE NOTES
Interscalene Brachial Plexus Catheter    Patient location during procedure: pre-op   Block not for primary anesthetic.  Reason for block: at surgeon's request and post-op pain management   Post-op Pain Location: right shoulder pain  Start time: 12/18/2020 8:40 AM  Timeout: 12/18/2020 8:40 AM   End time: 12/18/2020 8:50 AM    Staffing  Authorizing Provider: Ashley Caldwell MD  Performing Provider: Branden Caba MD    Preanesthetic Checklist  Completed: patient identified, site marked, surgical consent, pre-op evaluation, timeout performed, IV checked, risks and benefits discussed and monitors and equipment checked  Peripheral Block  Patient position: sitting  Prep: ChloraPrep and site prepped and draped  Patient monitoring: heart rate, cardiac monitor, continuous pulse ox, continuous capnometry and frequent blood pressure checks  Block type: interscalene  Laterality: right  Injection technique: continuous  Needle  Needle type: Tuohy   Needle gauge: 18 G  Needle length: 2 in  Needle localization: anatomical landmarks and ultrasound guidance  Catheter type: non-stimulating  Catheter size: 20 G  Test dose: lidocaine 1.5% with Epi 1-to-200,000 and negative   -ultrasound image captured on disc.  Assessment  Injection assessment: negative aspiration, negative parasthesia and local visualized surrounding nerve  Paresthesia pain: none  Heart rate change: no  Slow fractionated injection: yes  Additional Notes  VSS.  [facility] RN monitoring vitals throughout procedure.  Patient tolerated procedure well.

## 2020-12-18 NOTE — PROGRESS NOTES
"Upon watching pt reposition and sit himself up in bad after using urinal at bedside, HR elevated to the 160s. Anesthesia desk paged X 3 for available staff. EKG ordered by anesthesia. Ortho team notified of change in pt's condition. Pt reports feeling "weak, and hungry" no acute distress observed. Pt to be transported to PACU.  "

## 2020-12-18 NOTE — ANESTHESIA POSTPROCEDURE EVALUATION
Anesthesia Post Evaluation    Patient: Navjot Fine    Procedure(s) Performed: Procedure(s) (LRB):  DEBRIDEMENT, SHOULDER, ARTHROSCOPIC (Right)  HEMIARTHROPLASTY, SHOULDER  REPAIR, TENDON, BICEPS  TENODESIS ARTHROSCOPIC (TENDON FIXATION)    Final Anesthesia Type: general      Patient location during evaluation: PACU  Patient participation: Yes- Able to Participate  Level of consciousness: awake and alert  Post-procedure vital signs: reviewed and stable  Pain management: adequate  Airway patency: patent    PONV status at discharge: No PONV  Anesthetic complications: no      Cardiovascular status: blood pressure returned to baseline  Respiratory status: spontaneous ventilation and room air  Hydration status: euvolemic  Follow-up not needed.          Vitals Value Taken Time   /59 12/18/20 1442   Temp 36.6 °C (97.9 °F) 12/18/20 1442   Pulse 110 12/18/20 1542   Resp 16 12/18/20 1442   SpO2 98 % 12/18/20 1542   Vitals shown include unvalidated device data.      Event Time   Out of Recovery 14:38:00         Pain/Trenton Score: Pain Rating Prior to Med Admin: 0 (12/18/2020  1:45 PM)  T[place]on Score: 10 (12/18/2020  2:42 PM)

## 2020-12-18 NOTE — TRANSFER OF CARE
Anesthesia Transfer of Care Note    Patient: Navjot Fine    Procedure(s) Performed: Procedure(s) (LRB):  DEBRIDEMENT, SHOULDER, ARTHROSCOPIC (Right)  HEMIARTHROPLASTY, SHOULDER  REPAIR, TENDON, BICEPS  TENODESIS ARTHROSCOPIC (TENDON FIXATION)    Patient location: PACU    Anesthesia Type: general    Transport from OR: Transported from OR on 6-10 L/min O2 by face mask with adequate spontaneous ventilation    Post pain: adequate analgesia    Post assessment: no apparent anesthetic complications and tolerated procedure well    Post vital signs: stable    Level of consciousness: awake    Nausea/Vomiting: no nausea/vomiting    Complications: none    Transfer of care protocol was followed      Last vitals:   Visit Vitals  /71 (BP Location: Left arm, Patient Position: Lying)   Pulse 96   Temp 36.6 °C (97.9 °F) (Oral)   Resp 14   Ht 6' (1.829 m)   Wt 83.5 kg (184 lb)   SpO2 100%   BMI 24.95 kg/m²

## 2020-12-19 VITALS
HEART RATE: 90 BPM | DIASTOLIC BLOOD PRESSURE: 65 MMHG | RESPIRATION RATE: 16 BRPM | BODY MASS INDEX: 24.92 KG/M2 | HEIGHT: 72 IN | OXYGEN SATURATION: 97 % | SYSTOLIC BLOOD PRESSURE: 113 MMHG | TEMPERATURE: 98 F | WEIGHT: 184 LBS

## 2020-12-19 LAB
MAGNESIUM SERPL-MCNC: 1.7 MG/DL (ref 1.6–2.6)
PHOSPHATE SERPL-MCNC: 1.9 MG/DL (ref 2.7–4.5)
POCT GLUCOSE: 145 MG/DL (ref 70–110)

## 2020-12-19 PROCEDURE — 25000003 PHARM REV CODE 250: Performed by: STUDENT IN AN ORGANIZED HEALTH CARE EDUCATION/TRAINING PROGRAM

## 2020-12-19 PROCEDURE — 97530 THERAPEUTIC ACTIVITIES: CPT

## 2020-12-19 PROCEDURE — 84100 ASSAY OF PHOSPHORUS: CPT

## 2020-12-19 PROCEDURE — 99243 PR OFFICE CONSULTATION,LEVEL III: ICD-10-PCS | Mod: ,,, | Performed by: INTERNAL MEDICINE

## 2020-12-19 PROCEDURE — 99243 OFF/OP CNSLTJ NEW/EST LOW 30: CPT | Mod: ,,, | Performed by: INTERNAL MEDICINE

## 2020-12-19 PROCEDURE — 93010 EKG 12-LEAD: ICD-10-PCS | Mod: ,,, | Performed by: INTERNAL MEDICINE

## 2020-12-19 PROCEDURE — 97116 GAIT TRAINING THERAPY: CPT | Mod: 59

## 2020-12-19 PROCEDURE — 36415 COLL VENOUS BLD VENIPUNCTURE: CPT

## 2020-12-19 PROCEDURE — 93010 ELECTROCARDIOGRAM REPORT: CPT | Mod: ,,, | Performed by: INTERNAL MEDICINE

## 2020-12-19 PROCEDURE — 93005 ELECTROCARDIOGRAM TRACING: CPT

## 2020-12-19 PROCEDURE — 63600175 PHARM REV CODE 636 W HCPCS: Performed by: PHYSICIAN ASSISTANT

## 2020-12-19 PROCEDURE — 25000003 PHARM REV CODE 250: Performed by: PHYSICIAN ASSISTANT

## 2020-12-19 PROCEDURE — 83735 ASSAY OF MAGNESIUM: CPT

## 2020-12-19 PROCEDURE — 97165 OT EVAL LOW COMPLEX 30 MIN: CPT

## 2020-12-19 PROCEDURE — 97161 PT EVAL LOW COMPLEX 20 MIN: CPT

## 2020-12-19 RX ORDER — LANOLIN ALCOHOL/MO/W.PET/CERES
400 CREAM (GRAM) TOPICAL ONCE
Status: COMPLETED | OUTPATIENT
Start: 2020-12-19 | End: 2020-12-19

## 2020-12-19 RX ORDER — SODIUM,POTASSIUM PHOSPHATES 280-250MG
2 POWDER IN PACKET (EA) ORAL ONCE
Status: COMPLETED | OUTPATIENT
Start: 2020-12-19 | End: 2020-12-19

## 2020-12-19 RX ADMIN — Medication 400 MG: at 08:12

## 2020-12-19 RX ADMIN — FAMOTIDINE 20 MG: 20 TABLET, FILM COATED ORAL at 08:12

## 2020-12-19 RX ADMIN — CALCIUM CARBONATE (ANTACID) CHEW TAB 500 MG 500 MG: 500 CHEW TAB at 08:12

## 2020-12-19 RX ADMIN — MUPIROCIN 1 G: 20 OINTMENT TOPICAL at 08:12

## 2020-12-19 RX ADMIN — POTASSIUM & SODIUM PHOSPHATES POWDER PACK 280-160-250 MG 2 PACKET: 280-160-250 PACK at 08:12

## 2020-12-19 RX ADMIN — OXYCODONE HYDROCHLORIDE 5 MG: 5 TABLET ORAL at 01:12

## 2020-12-19 RX ADMIN — CEFAZOLIN 2 G: 1 INJECTION, POWDER, FOR SOLUTION INTRAMUSCULAR; INTRAVENOUS at 01:12

## 2020-12-19 RX ADMIN — OXYCODONE HYDROCHLORIDE 10 MG: 10 TABLET ORAL at 05:12

## 2020-12-19 NOTE — ASSESSMENT & PLAN NOTE
Navjot Fine is a 70 y.o. male s/p R shoulder arthroplasty 12/18/20. Post op complicated by afib with RVR. Controlled ON with home meds after metop 5mg x2 IV.       - Weight bearing status: NWB  - Pain control: MM  - Antibiotics: Periop Ancef  - DVT Prophylaxis: SCD's at all times when not ambulating  - Lines/Drains: PNC in place  - Dispo: Home today, patient eager to leave

## 2020-12-19 NOTE — NURSING
On-Q teaching done w/patient & patient and partner, both Verbalized understanding. Juliana Fine agrees to stay with pt for next 48 hrs while med infusing. All questions answered. On-Q contract signed, home care instxn pamphlet, home care supplies provided to patient upon discharge. Encouraged to contact anesthesia using # on back of brochure with any questions/concerns. Informed that pt/fmly will receive follow up calls on the next 2 days.

## 2020-12-19 NOTE — HPI
70 yr old male with known history of Afib on AC ( 15 years), bicuspid aortic valve, mild aortic stenosis, Ascending aortic enlargement ( F/U with Dr. Tadeo) admiited under Orthopedic surgery for right hemiarthroplasty + arthroscopic labral debridement for persistent shoulder pain. Post procedure HR went to 140- 160, SBP 87- 120. Had some palpitations but denied any other symptoms. No chest pain, syncope, LH, SOB, orthopnea, PND, leg swelling. Usually well controlled at home. He received IV lopressor and HR improved to 110. He is walking in the hallway with no distress. His home atenolol and diltiazem are resumed.  · TTE from 8/2020: LVEF normal, Moderate-to-severe aortic valve stenosis.Aortic valve area is 1.07 cm2; peak velocity is 2.14 m/s; mean gradient is 10 mmHg.  Mild--moderate to at most moderate (2+) mitral regurgitation.

## 2020-12-19 NOTE — CONSULTS
[facility]  Cardiology  Consult Note    Patient Name: Navjot Fine  MRN: [id number]  Admission Date: 12/18/2020  Hospital Length of Stay: 0 days  Code Status: No Order   Attending Provider: Tomasa Real MD  Consulting Provider: Tomasa Real MD  Primary Care Physician: Spencer Beth MD  Principal Problem:Osteoarthritis of right shoulder    Patient information was obtained from patient and ER records.     Inpatient consult to Cardiology  Consult performed by: Tomasa Real MD  Consult ordered by: Don Hernandez MD  Assessment/Recommendations: I have personally taken the history and examined the patient and agree with the resident's note as stated above.  Needs meds to control rate as he does at home.        Subjective:     Chief Complaint:  AFib     HPI:   70 yr old male with known history of Afib on AC ( 15 years), bicuspid aortic valve, mild aortic stenosis, Ascending aortic enlargement ( F/U with Dr. Tadeo) admiited under Orthopedic surgery for right hemiarthroplasty + arthroscopic labral debridement for persistent shoulder pain. Post procedure HR went to 140- 160, SBP 87- 120. Had some palpitations but denied any other symptoms. No chest pain, syncope, LH, SOB, orthopnea, PND, leg swelling. Usually well controlled at home. He received IV lopressor and HR improved to 110. He is walking in the hallway with no distress. His home atenolol and diltiazem are resumed.  · TTE from 8/2020: LVEF normal, Moderate-to-severe aortic valve stenosis.Aortic valve area is 1.07 cm2; peak velocity is 2.14 m/s; mean gradient is 10 mmHg.  Mild--moderate to at most moderate (2+) mitral regurgitation.    Past Medical History:   Diagnosis Date    Atrial fibrillation     Cholelithiasis        Past Surgical History:   Procedure Laterality Date    TOTAL HIP ARTHROPLASTY Left 2009    Copious blood loss    TOTAL HIP ARTHROPLASTY Right 2014    Intra-op transfusion prepared       Review of patient's  allergies indicates:   Allergen Reactions    No known allergies        No current facility-administered medications on file prior to encounter.      Current Outpatient Medications on File Prior to Encounter   Medication Sig    atenoloL (TENORMIN) 50 MG tablet TAKE 1 TABLET(50 MG) BY MOUTH EVERY DAY    b complex vitamins capsule Take 1 capsule by mouth once daily.    fluconazole (DIFLUCAN) 150 MG Tab 150 mg once a week.     luliconazole 1 % Crea APPLY AS A THIN LAYER TO THE AFFECTED AREA(S) PLUS A 1 INCH MARGIN OF HEALTHY SURROUNDING SKIN ONCE DAILY    zolpidem (AMBIEN) 10 mg Tab Take 1 tablet (10 mg total) by mouth nightly as needed.    warfarin (COUMADIN) 2 MG tablet TAKE 1 TABLET BY MOUTH DAILY EXCEPT TUESDAYS AND SATURDAYS OR AS DIRECTED BY COUMADIN CLINIC    warfarin (COUMADIN) 5 MG tablet TAKE 1 TABLET BY MOUTH 1 TIME DAILY     Family History     None        Tobacco Use    Smoking status: Never Smoker    Smokeless tobacco: Never Used   Substance and Sexual Activity    Alcohol use: Never     Frequency: Never     Comment: [facility].    Drug use: Never    Sexual activity: Not on file     Review of Systems   Constitution: Negative for decreased appetite, fever, malaise/fatigue and weight loss.   HENT: Negative for congestion.    Eyes: Negative for blurred vision.   Cardiovascular: Positive for palpitations. Negative for chest pain, dyspnea on exertion and orthopnea.   Respiratory: Negative for cough, shortness of breath and wheezing.    Endocrine: Negative for polyuria.   Gastrointestinal: Negative for bloating, abdominal pain and constipation.   Genitourinary: Negative for bladder incontinence.     Objective:     Vital Signs (Most Recent):  Temp: 97.5 °F (36.4 °C) (12/19/20 0118)  Pulse: 86 (12/19/20 0118)  Resp: 18 (12/19/20 0118)  BP: 118/74 (12/19/20 0118)  SpO2: 95 % (12/19/20 0118) Vital Signs (24h Range):  Temp:  [97 °F (36.1 °C)-98.1 °F (36.7 °C)] 97.5 °F (36.4 °C)  Pulse:  [] 86  Resp:   [13-24] 18  SpO2:  [92 %-100 %] 95 %  BP: ()/(50-98) 118/74     Weight: 83.5 kg (184 lb)  Body mass index is 24.95 kg/m².    SpO2: 95 %  O2 Device (Oxygen Therapy): room air      Intake/Output Summary (Last 24 hours) at 12/19/2020 0232  Last data filed at 12/18/2020 1110  Gross per 24 hour   Intake 1000 ml   Output --   Net 1000 ml       Lines/Drains/Airways     Epidural Line                 Perineural Analgesia/Anesthesia Assessment (using dermatomes) 12/18/20 0840 less than 1 day          Peripheral Intravenous Line                 Peripheral IV - Single Lumen 12/18/20 0813 18 G Left Wrist less than 1 day                Physical Exam   Constitutional: He is oriented to person, place, and time. He appears well-developed and well-nourished.   HENT:   Head: Normocephalic and atraumatic.   Eyes: Pupils are equal, round, and reactive to light. EOM are normal.   Neck: Normal range of motion.   Cardiovascular: Intact distal pulses.   No murmur heard.  Irregular, Tachycardic to 110.   Pulmonary/Chest: Effort normal and breath sounds normal. No respiratory distress. He has no rales.   Abdominal: Soft. Bowel sounds are normal. He exhibits no distension. There is no abdominal tenderness. There is no guarding.   Musculoskeletal:         General: No edema.   Neurological: He is alert and oriented to person, place, and time.   Skin: Skin is warm.   Psychiatric: He has a normal mood and affect.   Nursing note and vitals reviewed.      Significant Labs:   BMP:   Recent Labs   Lab 12/18/20  1936   *      K 3.9      CO2 20*   BUN 18   CREATININE 1.0   CALCIUM 7.5*   MG 1.7   , CMP   Recent Labs   Lab 12/18/20  1936      K 3.9      CO2 20*   *   BUN 18   CREATININE 1.0   CALCIUM 7.5*   PROT 5.7*   ALBUMIN 3.4*   BILITOT 1.0   ALKPHOS 48*   AST 29   ALT 18   ANIONGAP 9   ESTGFRAFRICA >60.0   EGFRNONAA >60.0   , CBC   Recent Labs   Lab 12/18/20  1307   HGB 11.3*   HCT 35.9*    and INR    Recent Labs   Lab 12/18/20  0751   INR 1.0       Significant Imaging: Echocardiogram:   2D echo with color flow doppler:   Results for orders placed or performed during the hospital encounter of 07/13/18   2D Echo w/ Color Flow Doppler   Result Value Ref Range    EF 60 55 - 65    Mitral Valve Regurgitation MILD     Diastolic Dysfunction No     Aortic Valve Regurgitation TRIVIAL     Aortic Valve Stenosis TRIVIAL     Est. PA Systolic Pressure 32.31     Mitral Valve Mobility NORMAL     Tricuspid Valve Regurgitation MILD     Narrative    Date of Procedure: 07/13/2018        TEST DESCRIPTION   Technical Quality: This is a technically adequate study.     Aorta: The aortic root is mildly enlarged, measuring 3.4 cm at sinotubular junction and 4.2 cm at Sinuses of Valsalva. The proximal ascending aorta is moderately enlarged, measuring 4.5 cm across.     Left Atrium: The left atrial volume index is severely enlarged, measuring 71.71 cc/m2.     Left Ventricle: The left ventricle is normal in size, with an end-diastolic diameter of 5.0 cm, and an end-systolic diameter of 3.2 cm. LV wall thickness is normal, with the septum measuring 1.0 cm and the posterior wall measuring 0.9 cm across. Relative   wall thickness was normal at 0.36, and the LV mass index was 90.4 g/m2 consistent with normal left ventricular mass. There are no regional wall motion abnormalities. Left ventricular systolic function appears normal. Visually estimated ejection fraction   is 60-65%. The LV Doppler derived stroke volume equals 72.0 ccs.     Diastolic indices: E/e' ratio(avg) 7. Diastolic function is normal.     Right Atrium: The right atrium is severely enlarged, measuring 7.9 cm in length and 4.9 cm in width in the apical view.     Right Ventricle: The right ventricle is normal in size. Global right ventricular systolic function appears normal. Tricuspid annular plane systolic excursion (TAPSE) is 2.8 cm. Tissue Doppler-derived tricuspid annular  peak systolic velocity (S prime) is   10.3 cm/s. The estimated PA systolic pressure is 32 mmHg.     Aortic Valve:  The aortic valve is moderately sclerotic with moderately restricted leaflet mobility. The aortic valve is bi-leaflet in structure. The peak velocity obtained across the aortic valve is 1.8 m/s, which translates to a peak gradient of 13   mmHg. The mean gradient is 8 mmHg. Using a left ventricular outflow tract diameter of 2.8 cm, a left ventricular outflow tract velocity time integral of 12 cm, and a peak instantaneous transvalvular velocity time integral of 32 cm, the calculated aortic   valve area is 2.24 cm2(AVAi is 1.02 cm2/m2), consistent with trivial aortic stenosis. Additionally, there is trivial aortic regurgitation.     Mitral Valve:  The mitral valve is mildly sclerotic with normal leaflet mobility. There is mild mitral regurgitation.     Tricuspid Valve:  There is mild tricuspid regurgitation.     Pulmonary Valve:  There is trivial pulmonic regurgitation.     IVC: IVC is enlarged and collapses < 50% with a sniff, suggesting high right atrial pressure of 15 mmHg.     Intracavitary: There is no evidence of pericardial effusion, intracavity mass, thrombi, or vegetation.         CONCLUSIONS     1 - Normal left ventricular systolic function (EF 60-65%).     2 - Normal right ventricular systolic function .     3 - Biatrial enlargement.     4 - Moderately enlarged ascending aorta (45mm in diameter).     5 - Bi-leaflet aortic valve with aortic sclerosis.     6 - Mild mitral regurgitation.     7 - Mild tricuspid regurgitation.     8 - The estimated PA systolic pressure is 32 mmHg.     9 - Increased central venous pressure.             This document has been electronically    SIGNED BY: Don Tsang MD On: 07/13/2018 14:25     Assessment and Plan:     Atrial fibrillation  - Known history of Afib on AC, [id number] of 1  - Recommend resuming home atenolol 50 q daily and diltiazem 240 mg daily.  - If  rate uncontrolled and sustains above 120, consider IV lopressor 5 mg IV, max of 3 doses, 10 minutes apart as long as SBP > 90 and HR > 60.  - Consider cardizem gtt if persistently uncontrolled.  - Resume AC when deemed appropriate by primary team.  - Replace K > 4 and Mag > 2.  - TSH/ Free T4.  - D/W staff.    Cariology team will follow up in am.    VTE Risk Mitigation (From admission, onward)         Ordered     Place sequential compression device  Until discontinued      12/18/20 0745     Place LÁZARO hose  Until discontinued      12/18/20 0745                Thank you for your consult. We will follow-up with patient. Please contact us if you have any additional questions.    Tomasa Real MD  Cardiology   [facility]

## 2020-12-19 NOTE — ASSESSMENT & PLAN NOTE
- Known history of Afib on AC, [id number] of 1  - Recommend resuming home atenolol 50 q daily and diltiazem 240 mg daily.  - If rate uncontrolled and sustains above 120, consider IV lopressor 5 mg IV, max of 3 doses, 10 minutes apart as long as SBP > 90 and HR > 60.  - Consider cardizem gtt if persistently uncontrolled.  - Resume AC when deemed appropriate by primary team.  - Replace K > 4 and Mag > 2.  - TSH/ Free T4.  - D/W staff.

## 2020-12-19 NOTE — PT/OT/SLP EVAL
"Physical Therapy Evaluation and Discharge Note  Co-eval with OT    Patient Name:  Navjot Fine   MRN:  [id number]    Recommendations:     Discharge Recommendations:  outpatient PT   Discharge Equipment Recommendations: none   Barriers to discharge: None    Assessment:     Navjot Fine is a 70 y.o. male admitted with a medical diagnosis of Osteoarthritis of right shoulder.  Pt is s/p surgery, as of 12/18/20.  Pt is with R UE NWB status.    Upon evaluation, pt is independent with all functional mobility assessed.  Ed pt on R UE NWB status, except for ADLs.  Ed on ball squeezes frequency.  Ed on donning sling.  At this time, patient is functioning at their prior level of function and does not require further acute PT services.     Recent Surgery: Procedure(s) (LRB):  DEBRIDEMENT, SHOULDER, ARTHROSCOPIC (Right)  HEMIARTHROPLASTY, SHOULDER (Right)  BICEPS TENODESIS, OPEN (Right) 1 Day Post-Op    Plan:     During this hospitalization, patient does not require further acute PT services.  Please re-consult if situation changes.      Subjective     Chief Complaint: being in the hospital  Patient/Family Comments/goals: "I walk 25k steps a day."  Pain/Comfort:  · Pain Rating 1: 0/10    Patients cultural, spiritual, Islam conflicts given the current situation: no    Living Environment:  Pt lives with spouse, H, 1 [address].   Prior to admission, patients level of function was I with all functional mobility and ADLs.  Equipment used at home: none.  DME owned (not currently used): none.  Upon discharge, patient will have assistance from spouse.    Objective:     Communicated with nursing prior to session.  Patient found standing in room with peripheral IV upon PT entry to room.    General Precautions: Standard, fall   Orthopedic Precautions:RUE non weight bearing   Braces: Shoulder abduction brace, UE Sling     Exams:  · Cognitive Exam:  Patient is oriented to Person, Place, Time and Situation  · Fine Motor Coordination:  "   · -       Intact  Left hand thumb/finger opposition skills and Right hand thumb/finger opposition skills  · Gross Motor Coordination:  WFL  · Postural Exam:  Patient presented with the following abnormalities:    · -       No postural abnormalities identified  · Skin Integrity/Edema:      · -       Edema: Mild R hand  · RUE ROM: in sling  · RUE Strength: in sling  · LUE ROM: WFL  · LUE Strength: WFL  · RLE ROM: WFL  · RLE Strength: WFL  · LLE ROM: WFL  · LLE Strength: WFL    Functional Mobility:  · Transfers:     · Sit to Stand:  independence with no AD  · Bed to Chair: independence with  no AD  using  Stand Pivot  · Gait: Pt amb >200', I, without AD, no episodes of LOB, no MORALES noted  · Balance: I: dynamic standing balance without AD  · Stairs:  Pt ascended/descended 3 stair(s) with No Assistive Device with left handrail with Modified Independent.     AM-PAC 6 CLICK MOBILITY  Total Score:24       Therapeutic Activities and Exercises:   Whiteboard updated    Ed on call button  Ed on PT POC and discharge    Readjustment of R UE sling  Ed on donning sling  Ed not performing pendulum ex at this time  Ed on frequency for ball squeezes    AM-PAC 6 CLICK MOBILITY  Total Score:24     Patient left standing in hallway  with all lines intact, call button in reach and nursing present.    GOALS:   Multidisciplinary Problems     Physical Therapy Goals     Not on file                History:     Past Medical History:   Diagnosis Date    Atrial fibrillation     Cholelithiasis        Past Surgical History:   Procedure Laterality Date    TOTAL HIP ARTHROPLASTY Left 2009    Copious blood loss    TOTAL HIP ARTHROPLASTY Right 2014    Intra-op transfusion prepared       Time Tracking:     PT Received On: 12/19/20  PT Start Time: 0905     PT Stop Time: 0921  PT Total Time (min): 16 min     Billable Minutes: Evaluation 8 and Gait Training 8       Vera Martines, PT  12/19/2020

## 2020-12-19 NOTE — PT/OT/SLP EVAL
"Occupational Therapy   Evaluation and Discharge Note    Name: Navjot Fine  MRN: [id number]  Admitting Diagnosis:  Osteoarthritis of right shoulder 1 Day Post-Op    Recommendations:     Discharge Recommendations: outpatient OT  Discharge Equipment Recommendations:  none  Barriers to discharge:  None    Assessment:     Navjot Fine is a 70 y.o. male with a medical diagnosis of Osteoarthritis of right shoulder. At this time, patient is functioning at their prior level of function and does not require further acute OT services.     OT Acute eval complete. Pt is Min A in dressing and Independent with adls and mobility. Pt instructed on how to lizeth/doff shoulder abduction brace and cryotherapy. Pt acknowledged understanding. Pt does not require Acute OT services and is safe to d/c home from an OT standpoint.     Plan:     During this hospitalization, patient does not require further acute OT services.  Please re-consult if situation changes.    · Plan of Care Reviewed with: patient    Subjective     Chief Complaint: "I'm ready to go home"  Patient/Family Comments/goals: to get better    Occupational Profile:  Living Environment: Pt lives with spouse in [facility] with 1STE  Previous level of function: Independent  Roles and Routines: very active  Equipment Used at home:  none  Assistance upon Discharge: Pt will have assistance from spouse upon d/c.    Pain/Comfort:  · Pain Rating 1: 0/10  · Location - Side 1: Right  · Location 1: shoulder  · Pain Addressed 1: Pre-medicate for activity, Reposition, Distraction    Patients cultural, spiritual, Methodist conflicts given the current situation: no    Objective:     Communicated with: RN prior to session.  Patient found standing in room with   upon OT entry to room.    General Precautions: Standard, fall   Orthopedic Precautions:RUE non weight bearing(no abduction, no external rotation RUE)   Braces: Shoulder abduction brace     Occupational Performance:    Bed Mobility:  "   · Not tested    Functional Mobility/Transfers:  · Patient completed Sit <> Stand Transfer with modified independence  with  no assistive device   · Functional Mobility: OT Acute eval complete. Pt is Min A in dressing and Independent with adls and mobility. Pt instructed on how to lizeth/doff shoulder abduction brace and cryotherapy. Pt acknowledged understanding.  Pt does not require Acute OT services and is safe to d/c home from an OT standpoint.     Activities of Daily Living: - required assistance from PCT  · Upper Body Dressing: minimum assistance lizeth shirt and jacket  · Lower Body Dressing: minimum assistance lizeth pants and shoes    Cognitive/Visual Perceptual:  Cognitive/Psychosocial Skills:     -       Oriented to: Person, Place, Time and Situation   -       Safety awareness/insight to disability: intact     Physical Exam:  Upper Extremity Range of Motion:     -       Left Upper Extremity: WFL  Upper Extremity Strength:    -       Left Upper Extremity: WFL   Strength:    -       Left Upper Extremity: WFL    AMPAC 6 Click ADL:  AMPAC Total Score: 21    Treatment & Education:  - OT/POC-  - Importance of mobility to maximize recovery.  - Educated pt on how to lizeth/doff shoulder abduction brace  - Advised pt on RUE shoulder protocols and cryotherapy  - safety w/ functional mobility; hand placement to ensure safe transfers to various surfaces in prep for ADLs  - encouraged to ambulate within household environment at least every hour to hour 1/2    Education:    Patient left standing in room with all lines intact, call button in reach and RN notified    GOALS:   Multidisciplinary Problems     Occupational Therapy Goals     Not on file                History:     Past Medical History:   Diagnosis Date    Atrial fibrillation     Cholelithiasis        Past Surgical History:   Procedure Laterality Date    TOTAL HIP ARTHROPLASTY Left 2009    Copious blood loss    TOTAL HIP ARTHROPLASTY Right 2014    Intra-op  transfusion prepared       Time Tracking:     OT Date of Treatment: 12/19/20  OT Start Time: 0905  OT Stop Time: 0921  OT Total Time (min): 16 min    Billable Minutes:Evaluation 8  Therapeutic Activity 8    Chante Sawant OT  12/19/2020

## 2020-12-19 NOTE — ANESTHESIA POST-OP PAIN MANAGEMENT
Acute Pain Service Progress Note    Navjot Fine is a 70 y.o., male, [id number] w/ AF w/ RVR and aortic stenosis. Admitted with R shoulder pain s/p hemiarthroplasty and debridement. Cardiology following and resumed home medications for rate control. On Q in place.     Surgery:  DEBRIDEMENT, SHOULDER, ARTHROSCOPIC (Right)  HEMIARTHROPLASTY, SHOULDER  REPAIR, TENDON, BICEPS  TENODESIS ARTHROSCOPIC (TENDON FIXATION)    Post Op Day #: 1    Catheter type: Interscalene Brachial Plexus Catheter    Infusion type: On Q in place @ 6mL/Hr    Problem List:    Active Hospital Problems    Diagnosis  POA    *Osteoarthritis of right shoulder [M19.011]  Yes    Primary osteoarthritis of right shoulder [M19.011]  Yes    Atrial fibrillation [I48.91]  Yes     chronic        Resolved Hospital Problems   No resolved problems to display.       Subjective:     General appearance of alert, oriented, no complaints   Pain with rest: 3    Numbers   Pain with movement: 6    Numbers   Side Effects    1. Pruritis No    2. Nausea No    3. Motor Blockade No, 0=Ability to raise lower extremities off bed    4. Sedation No, 1=awake and alert    Objective:     Catheter site clean, dry, intact      Vitals   Vitals:    12/19/20 0538   BP:    Pulse:    Resp: 18   Temp:         Labs    Admission on 12/18/2020   Component Date Value Ref Range Status    Prothrombin Time 12/18/2020 11.1  9.0 - 12.5 sec Final    INR 12/18/2020 1.0  0.8 - 1.2 Final    Group & Rh 12/18/2020 O POS   Final    Indirect Leanne 12/18/2020 NEG   Final    Hematocrit 12/18/2020 35.9* 40.0 - 54.0 % Final    Hemoglobin 12/18/2020 11.3* 14.0 - 18.0 g/dL Final    Magnesium 12/18/2020 1.7  1.6 - 2.6 mg/dL Final    Phosphorus 12/18/2020 2.1* 2.7 - 4.5 mg/dL Final    Sodium 12/18/2020 139  136 - 145 mmol/L Final    Potassium 12/18/2020 3.9  3.5 - 5.1 mmol/L Final    Chloride 12/18/2020 110  95 - 110 mmol/L Final    CO2 12/18/2020 20* 23 - 29 mmol/L Final    Glucose 12/18/2020  209* 70 - 110 mg/dL Final    BUN 12/18/2020 18  8 - 23 mg/dL Final    Creatinine 12/18/2020 1.0  0.5 - 1.4 mg/dL Final    Calcium 12/18/2020 7.5* 8.7 - 10.5 mg/dL Final    Total Protein 12/18/2020 5.7* 6.0 - 8.4 g/dL Final    Albumin 12/18/2020 3.4* 3.5 - 5.2 g/dL Final    Total Bilirubin 12/18/2020 1.0  0.1 - 1.0 mg/dL Final    Alkaline Phosphatase 12/18/2020 48* 55 - 135 U/L Final    AST 12/18/2020 29  10 - 40 U/L Final    ALT 12/18/2020 18  10 - 44 U/L Final    Anion Gap 12/18/2020 9  8 - 16 mmol/L Final    eGFR if African American 12/18/2020 >60.0  >60 mL/min/1.73 m^2 Final    eGFR if non African American 12/18/2020 >60.0  >60 mL/min/1.73 m^2 Final    Troponin I 12/18/2020 <0.006  0.000 - 0.026 ng/mL Final    Magnesium 12/19/2020 1.7  1.6 - 2.6 mg/dL Final    Phosphorus 12/19/2020 1.9* 2.7 - 4.5 mg/dL Final        Meds   Current Facility-Administered Medications   Medication Dose Route Frequency Provider Last Rate Last Dose    0.9%  NaCl infusion   Intravenous Continuous Elias Saeed MD   Stopped at 12/18/20 1110    acetaminophen tablet 1,000 mg  1,000 mg Oral Q6H Andrea Montgomery PA-C        atenoloL tablet 50 mg  50 mg Oral QHS Don Hernandez MD        calcium carbonate 200 mg calcium (500 mg) chewable tablet 500 mg  500 mg Oral TID Don Hernandez MD   500 mg at 12/18/20 2244    celecoxib capsule 400 mg  400 mg Oral Once Andrea Montgomery PA-C        And    celecoxib capsule 200 mg  200 mg Oral BID Andrea Montgomery PA-C        dextrose 5 % and 0.9 % NaCl infusion   Intravenous Continuous Andrea Montgomery PA-C 125 mL/hr at 12/18/20 2026 125 mL/hr at 12/18/20 2026    dextrose 50% injection 12.5 g  12.5 g Intravenous PRN Don Hernandez MD        dextrose 50% injection 25 g  25 g Intravenous PRN Don Hernandez MD        diltiaZEM 24 hr capsule 240 mg  240 mg Oral QHS Don Hernandez MD   240 mg at 12/18/20 2814    famotidine tablet 20 mg   20 mg Oral BID Andrea Montgomery PA-C   20 mg at 12/18/20 2019    glucagon (human recombinant) injection 1 mg  1 mg Intramuscular PRN Don Hernandez MD        glucose chewable tablet 16 g  16 g Oral PRN Don Hernandez MD        glucose chewable tablet 24 g  24 g Oral PRN Don Hernandez MD        insulin aspart U-100 pen 0-5 Units  0-5 Units Subcutaneous QID (AC + HS) PRN Don Hernandez MD        magnesium oxide tablet 400 mg  400 mg Oral Once Don Hernandez MD        morphine injection 2 mg  2 mg Intravenous Q3H PRN Don Hernandez MD        mupirocin 2 % ointment 1 g  1 g Nasal BID Andrea Montgomery PA-C   1 g at 12/18/20 2020    ondansetron disintegrating tablet 8 mg  8 mg Oral Q8H PRN Andrea Montgomery PA-C        oxyCODONE immediate release tablet 10 mg  10 mg Oral Q3H PRN Andrea Montgomery PA-C   10 mg at 12/19/20 0538    oxyCODONE immediate release tablet 5 mg  5 mg Oral Q3H PRN Andrea Montgomery PA-C   5 mg at 12/19/20 0113    polyethylene glycol packet 17 g  17 g Oral Daily Andrea Montgomery PA-C        potassium, sodium phosphates 280-160-250 mg packet 2 packet  2 packet Oral Once Don Hernandez MD        pregabalin capsule 150 mg  150 mg Oral QHS Andrea Montgomery PA-C   50 mg at 12/18/20 2019    promethazine (PHENERGAN) 6.25 mg in dextrose 5 % 50 mL IVPB  6.25 mg Intravenous Q6H PRN Andrea Montgomery PA-C        promethazine tablet 25 mg  25 mg Oral Q4H PRN Don Hernandez MD        ropivacaine (PF) 2 mg/ml (0.2%) solution  6 mL/hr Perineural Continuous Andrea Montgomery PA-C 6 mL/hr at 12/18/20 1329 6 mL/hr at 12/18/20 1329    ropivacaine 0.2% ON-Q C-BLOC 400 ML (SELECT A FLOW)   Perineural Continuous Branden Caba MD 6 mL/hr at 12/18/20 1611      ropivacaine 0.2% ON-Q C-BLOC 400 ML (SELECT A FLOW)   Perineural Continuous Andrea Montgomery PA-C        senna-docusate 8.6-50 mg per tablet 1  tablet  1 tablet Oral BID Andrea Montgomery PA-C   1 tablet at 12/18/20 2019    sodium chloride 0.9% flush 10 mL  10 mL Intravenous PRN Andrea Montgomery PA-C        zolpidem tablet 10 mg  10 mg Oral Nightly PRN Don Hernandez MD           Assessment:     Pain control adequate. Patient stayed 2/2 AF w/ RVR. Now controlled with home regiment. Pain is well controlled with OnQ in place.     Plan:     Patient doing well, continue present treatment.    1) Continue MM- Tylenol, celocoxib, pregabalin   2) OnQ ready for discharge   3) Continue PRN opioids with preference for PO Oxy over IV morphine    Evaluator Carlos Alberto Caceres MD

## 2020-12-19 NOTE — PROGRESS NOTES
[facility]  Orthopedics  Progress Note    Patient Name: Navjot Fine  MRN: [id number]  Admission Date: 12/18/2020  Hospital Length of Stay: 0 days  Attending Provider: Elias Saeed MD  Primary Care Provider: Spencer Beth MD  Follow-up For: Procedure(s) (LRB):  DEBRIDEMENT, SHOULDER, ARTHROSCOPIC (Right)  HEMIARTHROPLASTY, SHOULDER (Right)  BICEPS TENODESIS, OPEN (Right)    Post-Operative Day: 1 Day Post-Op  Subjective:     Principal Problem:Osteoarthritis of right shoulder    Principal Orthopedic Problem: Same    Interval History: Pt seen and examined at bedside. NAEON. No tachycardia ON after administration of home meds. Pt feeling well with no complaints.     Review of patient's allergies indicates:   Allergen Reactions    No known allergies        Current Facility-Administered Medications   Medication    0.9%  NaCl infusion    acetaminophen tablet 1,000 mg    atenoloL tablet 50 mg    calcium carbonate 200 mg calcium (500 mg) chewable tablet 500 mg    celecoxib capsule 400 mg    And    celecoxib capsule 200 mg    dextrose 5 % and 0.9 % NaCl infusion    dextrose 50% injection 12.5 g    dextrose 50% injection 25 g    diltiaZEM 24 hr capsule 240 mg    famotidine tablet 20 mg    glucagon (human recombinant) injection 1 mg    glucose chewable tablet 16 g    glucose chewable tablet 24 g    insulin aspart U-100 pen 0-5 Units    morphine injection 2 mg    mupirocin 2 % ointment 1 g    ondansetron disintegrating tablet 8 mg    oxyCODONE immediate release tablet 10 mg    oxyCODONE immediate release tablet 5 mg    polyethylene glycol packet 17 g    pregabalin capsule 150 mg    promethazine (PHENERGAN) 6.25 mg in dextrose 5 % 50 mL IVPB    promethazine tablet 25 mg    ropivacaine (PF) 2 mg/ml (0.2%) solution    ropivacaine 0.2% ON-Q C-BLOC 400 ML (SELECT A FLOW)    ropivacaine 0.2% ON-Q C-BLOC 400 ML (SELECT A FLOW)    senna-docusate 8.6-50 mg per tablet 1 tablet     sodium chloride 0.9% flush 10 mL    zolpidem tablet 10 mg     Objective:     Vital Signs (Most Recent):  Temp: 97.3 °F (36.3 °C) (12/19/20 0510)  Pulse: 78 (12/19/20 0510)  Resp: 18 (12/19/20 0538)  BP: 106/70 (12/19/20 0510)  SpO2: 96 % (12/19/20 0510) Vital Signs (24h Range):  Temp:  [97 °F (36.1 °C)-98.1 °F (36.7 °C)] 97.3 °F (36.3 °C)  Pulse:  [] 78  Resp:  [13-24] 18  SpO2:  [92 %-100 %] 96 %  BP: ()/(50-98) 106/70     Weight: 83.5 kg (184 lb)  Height: 6' (182.9 cm)  Body mass index is 24.95 kg/m².      Intake/Output Summary (Last 24 hours) at 12/19/2020 0656  Last data filed at 12/18/2020 1110  Gross per 24 hour   Intake 1000 ml   Output --   Net 1000 ml       Ortho/SPM Exam     Physical Exam:  NAD, A/O x 3.  Polar care in place  Sling in place  Wound c/d/i with clean dressing.  Expected  focal motor or sensory deficits noted, 2/2 block  PNC in place        Significant Labs:   BMP:   Recent Labs   Lab 12/18/20 1936 12/19/20  0310   *  --      --    K 3.9  --      --    CO2 20*  --    BUN 18  --    CREATININE 1.0  --    CALCIUM 7.5*  --    MG 1.7 1.7     CBC:   Recent Labs   Lab 12/18/20  1307   HGB 11.3*   HCT 35.9*     CMP:   Recent Labs   Lab 12/18/20 1936      K 3.9      CO2 20*   *   BUN 18   CREATININE 1.0   CALCIUM 7.5*   PROT 5.7*   ALBUMIN 3.4*   BILITOT 1.0   ALKPHOS 48*   AST 29   ALT 18   ANIONGAP 9   EGFRNONAA >60.0     All pertinent labs within the past 24 hours have been reviewed.    Significant Imaging: I have reviewed all pertinent imaging results/findings.    Assessment/Plan:     * Osteoarthritis of right shoulder  Navjot P Monterey is a 70 y.o. male s/p R shoulder arthroplasty 12/18/20. Post op complicated by afib with RVR. Controlled ON with home meds after metop 5mg x2 IV.       - Weight bearing status: NWB  - Pain control: MM  - Antibiotics: Periop Ancef  - DVT Prophylaxis: SCD's at all times when not ambulating  - Lines/Drains: PNC in  place  - Dispo: Home today, patient eager to leave            Don Hernandez MD  Orthopedics  [facility]

## 2020-12-19 NOTE — NURSING
Pt received discharge instructions. Verbalized understanding of all instructions. Prescriptions delivered to bedside via pharmacy. OnQ pain ball in place - ABEL Valladares to provide education to patient and spouse upon discharge. PIV removed, no redness/swelling. Awaiting wheelchair for transport.

## 2020-12-19 NOTE — NURSING TRANSFER
Nursing Transfer Note      12/18/2020     Transfer To: PACU 10 FROM [facility] 27    Transfer via stretcher    Transfer with belongings    Transported by RN and MD    Medicines sent: home medications in book bag, ON-Q ball    Chart send with patient: Yes    Notified: Brunilda RN, Wife notified by     Patient reassessed at: [phone]    Upon arrival to floor: cardiac monitor applied, patient oriented to room, call bell in reach and bed in lowest position  No acute distress noted. Pt updated wife of current change over cell phone. Orthopedic resident escorted pt to PACU with RN and remained at bedside during transfer of care. New orders to be placed. Belongings placed under bed. Pt continues to have no complaints of pain and able to tolerate po intake.

## 2020-12-19 NOTE — PROGRESS NOTES
Report called to Yamila ROMAN,pt made ready for transport to [facility] with tele via stretcher per transporter, HR remain afib with fluctuating HR, BP stable ,pt remains asymptomatic, kyleigh po fluids and meds, Polar ice and Qball intact,denies c/o pain,wiggles fingers freely,cap refill<3sec,stable at present.

## 2020-12-19 NOTE — PLAN OF CARE
OT Acute eval complete. Pt is Min A in dressing and Independent with adls and mobility. Pt instructed on how to lizeth/doff shoulder abduction brace and cryotherapy. Pt acknowledged understanding. Pt does not require Acute OT services and is safe to d/c home from an OT standpoint.

## 2020-12-19 NOTE — SUBJECTIVE & OBJECTIVE
Past Medical History:   Diagnosis Date    Atrial fibrillation     Cholelithiasis        Past Surgical History:   Procedure Laterality Date    TOTAL HIP ARTHROPLASTY Left 2009    Copious blood loss    TOTAL HIP ARTHROPLASTY Right 2014    Intra-op transfusion prepared       Review of patient's allergies indicates:   Allergen Reactions    No known allergies        No current facility-administered medications on file prior to encounter.      Current Outpatient Medications on File Prior to Encounter   Medication Sig    atenoloL (TENORMIN) 50 MG tablet TAKE 1 TABLET(50 MG) BY MOUTH EVERY DAY    b complex vitamins capsule Take 1 capsule by mouth once daily.    fluconazole (DIFLUCAN) 150 MG Tab 150 mg once a week.     luliconazole 1 % Crea APPLY AS A THIN LAYER TO THE AFFECTED AREA(S) PLUS A 1 INCH MARGIN OF HEALTHY SURROUNDING SKIN ONCE DAILY    zolpidem (AMBIEN) 10 mg Tab Take 1 tablet (10 mg total) by mouth nightly as needed.    warfarin (COUMADIN) 2 MG tablet TAKE 1 TABLET BY MOUTH DAILY EXCEPT TUESDAYS AND SATURDAYS OR AS DIRECTED BY COUMADIN CLINIC    warfarin (COUMADIN) 5 MG tablet TAKE 1 TABLET BY MOUTH 1 TIME DAILY     Family History     None        Tobacco Use    Smoking status: Never Smoker    Smokeless tobacco: Never Used   Substance and Sexual Activity    Alcohol use: Never     Frequency: Never     Comment: OCCA.    Drug use: Never    Sexual activity: Not on file     Review of Systems   Constitution: Negative for decreased appetite, fever, malaise/fatigue and weight loss.   HENT: Negative for congestion.    Eyes: Negative for blurred vision.   Cardiovascular: Positive for palpitations. Negative for chest pain, dyspnea on exertion and orthopnea.   Respiratory: Negative for cough, shortness of breath and wheezing.    Endocrine: Negative for polyuria.   Gastrointestinal: Negative for bloating, abdominal pain and constipation.   Genitourinary: Negative for bladder incontinence.     Objective:      Vital Signs (Most Recent):  Temp: 97.5 °F (36.4 °C) (12/19/20 0118)  Pulse: 86 (12/19/20 0118)  Resp: 18 (12/19/20 0118)  BP: 118/74 (12/19/20 0118)  SpO2: 95 % (12/19/20 0118) Vital Signs (24h Range):  Temp:  [97 °F (36.1 °C)-98.1 °F (36.7 °C)] 97.5 °F (36.4 °C)  Pulse:  [] 86  Resp:  [13-24] 18  SpO2:  [92 %-100 %] 95 %  BP: ()/(50-98) 118/74     Weight: 83.5 kg (184 lb)  Body mass index is 24.95 kg/m².    SpO2: 95 %  O2 Device (Oxygen Therapy): room air      Intake/Output Summary (Last 24 hours) at 12/19/2020 0232  Last data filed at 12/18/2020 1110  Gross per 24 hour   Intake 1000 ml   Output --   Net 1000 ml       Lines/Drains/Airways     Epidural Line                 Perineural Analgesia/Anesthesia Assessment (using dermatomes) 12/18/20 0840 less than 1 day          Peripheral Intravenous Line                 Peripheral IV - Single Lumen 12/18/20 0813 18 G Left Wrist less than 1 day                Physical Exam   Constitutional: He is oriented to person, place, and time. He appears well-developed and well-nourished.   HENT:   Head: Normocephalic and atraumatic.   Eyes: Pupils are equal, round, and reactive to light. EOM are normal.   Neck: Normal range of motion.   Cardiovascular: Intact distal pulses.   No murmur heard.  Irregular, Tachycardic to 110.   Pulmonary/Chest: Effort normal and breath sounds normal. No respiratory distress. He has no rales.   Abdominal: Soft. Bowel sounds are normal. He exhibits no distension. There is no abdominal tenderness. There is no guarding.   Musculoskeletal:         General: No edema.   Neurological: He is alert and oriented to person, place, and time.   Skin: Skin is warm.   Psychiatric: He has a normal mood and affect.   Nursing note and vitals reviewed.      Significant Labs:   BMP:   Recent Labs   Lab 12/18/20 1936   *      K 3.9      CO2 20*   BUN 18   CREATININE 1.0   CALCIUM 7.5*   MG 1.7   , CMP   Recent Labs   Lab 12/18/20 1936       K 3.9      CO2 20*   *   BUN 18   CREATININE 1.0   CALCIUM 7.5*   PROT 5.7*   ALBUMIN 3.4*   BILITOT 1.0   ALKPHOS 48*   AST 29   ALT 18   ANIONGAP 9   ESTGFRAFRICA >60.0   EGFRNONAA >60.0   , CBC   Recent Labs   Lab 12/18/20  1307   HGB 11.3*   HCT 35.9*    and INR   Recent Labs   Lab 12/18/20  0751   INR 1.0       Significant Imaging: Echocardiogram:   2D echo with color flow doppler:   Results for orders placed or performed during the hospital encounter of 07/13/18   2D Echo w/ Color Flow Doppler   Result Value Ref Range    EF 60 55 - 65    Mitral Valve Regurgitation MILD     Diastolic Dysfunction No     Aortic Valve Regurgitation TRIVIAL     Aortic Valve Stenosis TRIVIAL     Est. PA Systolic Pressure 32.31     Mitral Valve Mobility NORMAL     Tricuspid Valve Regurgitation MILD     Narrative    Date of Procedure: 07/13/2018        TEST DESCRIPTION   Technical Quality: This is a technically adequate study.     Aorta: The aortic root is mildly enlarged, measuring 3.4 cm at sinotubular junction and 4.2 cm at Sinuses of Valsalva. The proximal ascending aorta is moderately enlarged, measuring 4.5 cm across.     Left Atrium: The left atrial volume index is severely enlarged, measuring 71.71 cc/m2.     Left Ventricle: The left ventricle is normal in size, with an end-diastolic diameter of 5.0 cm, and an end-systolic diameter of 3.2 cm. LV wall thickness is normal, with the septum measuring 1.0 cm and the posterior wall measuring 0.9 cm across. Relative   wall thickness was normal at 0.36, and the LV mass index was 90.4 g/m2 consistent with normal left ventricular mass. There are no regional wall motion abnormalities. Left ventricular systolic function appears normal. Visually estimated ejection fraction   is 60-65%. The LV Doppler derived stroke volume equals 72.0 ccs.     Diastolic indices: E/e' ratio(avg) 7. Diastolic function is normal.     Right Atrium: The right atrium is severely enlarged,  measuring 7.9 cm in length and 4.9 cm in width in the apical view.     Right Ventricle: The right ventricle is normal in size. Global right ventricular systolic function appears normal. Tricuspid annular plane systolic excursion (TAPSE) is 2.8 cm. Tissue Doppler-derived tricuspid annular peak systolic velocity (S prime) is   10.3 cm/s. The estimated PA systolic pressure is 32 mmHg.     Aortic Valve:  The aortic valve is moderately sclerotic with moderately restricted leaflet mobility. The aortic valve is bi-leaflet in structure. The peak velocity obtained across the aortic valve is 1.8 m/s, which translates to a peak gradient of 13   mmHg. The mean gradient is 8 mmHg. Using a left ventricular outflow tract diameter of 2.8 cm, a left ventricular outflow tract velocity time integral of 12 cm, and a peak instantaneous transvalvular velocity time integral of 32 cm, the calculated aortic   valve area is 2.24 cm2(AVAi is 1.02 cm2/m2), consistent with trivial aortic stenosis. Additionally, there is trivial aortic regurgitation.     Mitral Valve:  The mitral valve is mildly sclerotic with normal leaflet mobility. There is mild mitral regurgitation.     Tricuspid Valve:  There is mild tricuspid regurgitation.     Pulmonary Valve:  There is trivial pulmonic regurgitation.     IVC: IVC is enlarged and collapses < 50% with a sniff, suggesting high right atrial pressure of 15 mmHg.     Intracavitary: There is no evidence of pericardial effusion, intracavity mass, thrombi, or vegetation.         CONCLUSIONS     1 - Normal left ventricular systolic function (EF 60-65%).     2 - Normal right ventricular systolic function .     3 - Biatrial enlargement.     4 - Moderately enlarged ascending aorta (45mm in diameter).     5 - Bi-leaflet aortic valve with aortic sclerosis.     6 - Mild mitral regurgitation.     7 - Mild tricuspid regurgitation.     8 - The estimated PA systolic pressure is 32 mmHg.     9 - Increased central venous  pressure.             This document has been electronically    SIGNED BY: Don Tsang MD On: 07/13/2018 14:25

## 2020-12-19 NOTE — CONSULTS
Medicine was consulted concerning A. Fib and RVR but Cardiology consulted and already has addressed this issue. Will defer to Cardiology consult team for mangement of this issue. HR seems much better controlled this am so from medicine prospective no need for our team to see patient as consultation.        LOBITO MARVIN MD  Attending Staff Physician   Department of Hospital Medicine, [facility]  Pager: [phone]  Spectralink: [phone]

## 2020-12-19 NOTE — SUBJECTIVE & OBJECTIVE
Principal Problem:Osteoarthritis of right shoulder    Principal Orthopedic Problem: Same    Interval History: Pt seen and examined at bedside. GRACIE. No tachycardia ON after administration of home meds. Pt feeling well with no complaints.     Review of patient's allergies indicates:   Allergen Reactions    No known allergies        Current Facility-Administered Medications   Medication    0.9%  NaCl infusion    acetaminophen tablet 1,000 mg    atenoloL tablet 50 mg    calcium carbonate 200 mg calcium (500 mg) chewable tablet 500 mg    celecoxib capsule 400 mg    And    celecoxib capsule 200 mg    dextrose 5 % and 0.9 % NaCl infusion    dextrose 50% injection 12.5 g    dextrose 50% injection 25 g    diltiaZEM 24 hr capsule 240 mg    famotidine tablet 20 mg    glucagon (human recombinant) injection 1 mg    glucose chewable tablet 16 g    glucose chewable tablet 24 g    insulin aspart U-100 pen 0-5 Units    morphine injection 2 mg    mupirocin 2 % ointment 1 g    ondansetron disintegrating tablet 8 mg    oxyCODONE immediate release tablet 10 mg    oxyCODONE immediate release tablet 5 mg    polyethylene glycol packet 17 g    pregabalin capsule 150 mg    promethazine (PHENERGAN) 6.25 mg in dextrose 5 % 50 mL IVPB    promethazine tablet 25 mg    ropivacaine (PF) 2 mg/ml (0.2%) solution    ropivacaine 0.2% ON-Q C-BLOC 400 ML (SELECT A FLOW)    ropivacaine 0.2% ON-Q C-BLOC 400 ML (SELECT A FLOW)    senna-docusate 8.6-50 mg per tablet 1 tablet    sodium chloride 0.9% flush 10 mL    zolpidem tablet 10 mg     Objective:     Vital Signs (Most Recent):  Temp: 97.3 °F (36.3 °C) (12/19/20 0510)  Pulse: 78 (12/19/20 0510)  Resp: 18 (12/19/20 0538)  BP: 106/70 (12/19/20 0510)  SpO2: 96 % (12/19/20 0510) Vital Signs (24h Range):  Temp:  [97 °F (36.1 °C)-98.1 °F (36.7 °C)] 97.3 °F (36.3 °C)  Pulse:  [] 78  Resp:  [13-24] 18  SpO2:  [92 %-100 %] 96 %  BP: ()/(50-98) 106/70     Weight: 83.5 kg  (184 lb)  Height: 6' (182.9 cm)  Body mass index is 24.95 kg/m².      Intake/Output Summary (Last 24 hours) at 12/19/2020 0656  Last data filed at 12/18/2020 1110  Gross per 24 hour   Intake 1000 ml   Output --   Net 1000 ml       Ortho/SPM Exam     Physical Exam:  NAD, A/O x 3.  Polar care in place  Sling in place  Wound c/d/i with clean dressing.  Expected  focal motor or sensory deficits noted, 2/2 block  PNC in place        Significant Labs:   BMP:   Recent Labs   Lab 12/18/20 1936 12/19/20  0310   *  --      --    K 3.9  --      --    CO2 20*  --    BUN 18  --    CREATININE 1.0  --    CALCIUM 7.5*  --    MG 1.7 1.7     CBC:   Recent Labs   Lab 12/18/20  1307   HGB 11.3*   HCT 35.9*     CMP:   Recent Labs   Lab 12/18/20 1936      K 3.9      CO2 20*   *   BUN 18   CREATININE 1.0   CALCIUM 7.5*   PROT 5.7*   ALBUMIN 3.4*   BILITOT 1.0   ALKPHOS 48*   AST 29   ALT 18   ANIONGAP 9   EGFRNONAA >60.0     All pertinent labs within the past 24 hours have been reviewed.    Significant Imaging: I have reviewed all pertinent imaging results/findings.

## 2020-12-20 NOTE — DISCHARGE SUMMARY
"[facility]  Orthopedics  Discharge Summary      Patient Name: Navjot Fine  MRN: [id number]  Admission Date: 12/18/2020  Hospital Length of Stay: 0 days  Discharge Date and Time: 12/19/2020  9:54 AM  Attending Physician: Elias Saeed MD   Discharging Provider: Don Hernandez MD  Primary Care Provider: Spencer Beth MD    HPI:   Navjot Fine  is here for a completion of his perioperative paperwork. he  Is scheduled to undergo Right shoulder:  1. Right Arthrosurface TSR (40 mm humeral head / Large glenoid component)   with large inlay glenoid vs. Fascia lou  2. Right glenoid resurfacing graft (Fascia lou) - [place] technique  3. Right open biceps tenodesis  4. Right arthroscopic rotator cuff debridement  5. Right arthroscopic labral debridement on 12/18/2020.     Placed an outpatient, patient will go home same day      He  does need PCP and Cardiology clearance for this procedure.      Per PCP, Spencer Beth MD, "Pt is cleared for surgery from an internal medicine standpoint. I would also recommend cardiology statement of clearance based on Afib and heart valve"    Per Cardiology, "RCRI is 0 with 30 day 3/9% risk of death, MI or cardiac arrest  - No active cardiac conditions. Excellent functional capacity.  - Ok to proceed with surgery without further testing  - Patient will hold warfarin 5-7 days prior to surgery. Restart AC when ok by surgeon."        Risks, indications and benefits of the surgical procedure were discussed with the patient. All questions with regard to surgery, rehab, expected return to functional activities, activities of daily living and recreational endeavors were answered to his satisfaction.     Discussed COVID-19 with the patient, they are aware of our current policies and procedures, were given the option of delaying surgery, and they elect to proceed.     Patient was informed and understands the risks of surgery are greater for patients with a " current condition or history of heart disease, obesity, clotting disorders, recurrent infections, steroid use, current or past smoking, and factors such as sedentary lifestyle and noncompliance with medications, therapy or follow-up. The degree of the increased risk is hard to estimate with any degree of precision.     Once no other questions were asked, a brief history and physical exam was then performed.    Procedure(s) (LRB):  DEBRIDEMENT, SHOULDER, ARTHROSCOPIC (Right)  HEMIARTHROPLASTY, SHOULDER (Right)  BICEPS TENODESIS, OPEN (Right)      Hospital Course:  Patient presented for above procedure.  Tolerated it well and was discharged home POD 1 after voiding, tolerating diet, ambulating, pain controlled.  Discharge instructions, follow-up appointment, and med rec are below. On POD 0 the patient went into Afib with RVR. In PACU attempted 5mg metoprolol which was unsuccessful. Restarted home meds and consulted Cardiology. Pt went into normal rhythm overnight and remained stable throughout night and morning. Repleted Mag and phos while inpatient.        Consults (From admission, onward)        Status Ordering Provider     Inpatient consult to Cardiology  Once     Provider:  (Not yet assigned)    Completed BONNIE GLASGOW     Inpatient consult to [facility] Medicine-Ortho Comanagement Only  Once     Provider:  (Not yet assigned)    Completed BONNIE GLASGOW          Significant Diagnostic Studies: No pertinent studies.    Pending Diagnostic Studies:     Procedure Component Value Units Date/Time    Specimen to Pathology, Surgery Orthopedics [[id number]] Collected: 12/18/20 1225    Order Status: Sent Lab Status: In process Updated: 12/18/20 1445        Final Active Diagnoses:    Diagnosis Date Noted POA    PRINCIPAL PROBLEM:  Osteoarthritis of right shoulder [M19.011] 12/18/2020 Yes    Aortic root dilation [I77.810]  Unknown    Primary osteoarthritis of right shoulder [M19.011] 09/25/2020 Yes    Atrial  fibrillation [I48.91] 09/26/2012 Yes      Problems Resolved During this Admission:      Discharged Condition: stable    Disposition: Home or Self Care    Follow Up: In clinic    Patient Instructions:   Please follow provided post op protocol. Call with any concerns including fever, chills, uncontrolled pain, shortness of breather or other concerning symptoms.     Medications:  Reconciled Home Medications:      Medication List      CONTINUE taking these medications    atenoloL 50 MG tablet  Commonly known as: TENORMIN  TAKE 1 TABLET(50 MG) BY MOUTH EVERY DAY     b complex vitamins capsule  Take 1 capsule by mouth once daily.     celecoxib 200 MG capsule  Commonly known as: CeleBREX  Take 1 capsule (200 mg total) by mouth 2 (two) times daily with meals. (breakfast and dinner)     diltiaZEM 240 MG 24 hr capsule  Commonly known as: CARDIZEM CD  Take 1 capsule (240 mg total) by mouth once daily.     fluconazole 150 MG Tab  Commonly known as: DIFLUCAN  150 mg once a week.     luliconazole 1 % Crea  APPLY AS A THIN LAYER TO THE AFFECTED AREA(S) PLUS A 1 INCH MARGIN OF HEALTHY SURROUNDING SKIN ONCE DAILY     oxyCODONE-acetaminophen  mg per tablet  Commonly known as: PERCOCET  Take 1 tablet by mouth every 6 (six) hours as needed for Pain.     promethazine 25 MG tablet  Commonly known as: PHENERGAN  Take 1 tablet (25 mg total) by mouth every 4 (four) hours as needed for Nausea.     traMADoL 50 mg tablet  Commonly known as: ULTRAM  Take 1 tablet (50 mg total) by mouth every 6 (six) hours as needed for Pain (for severe pain).     * warfarin 2 MG tablet  Commonly known as: COUMADIN  TAKE 1 TABLET BY MOUTH DAILY EXCEPT TUESDAYS AND SATURDAYS OR AS DIRECTED BY COUMADIN CLINIC     * warfarin 5 MG tablet  Commonly known as: COUMADIN  TAKE 1 TABLET BY MOUTH 1 TIME DAILY     zolpidem 10 mg Tab  Commonly known as: AMBIEN  Take 1 tablet (10 mg total) by mouth nightly as needed.         * This list has 2 medication(s) that are the  same as other medications prescribed for you. Read the directions carefully, and ask your doctor or other care provider to review them with you.                Don Hernandez MD  Orthopedics  [facility]

## 2020-12-21 ENCOUNTER — CLINICAL SUPPORT (OUTPATIENT)
Dept: REHABILITATION | Facility: HOSPITAL | Age: 70
End: 2020-12-21
Payer: COMMERCIAL

## 2020-12-21 DIAGNOSIS — M75.21 BICEPS TENDINITIS OF RIGHT SHOULDER: ICD-10-CM

## 2020-12-21 DIAGNOSIS — M19.011 PRIMARY OSTEOARTHRITIS OF RIGHT SHOULDER: ICD-10-CM

## 2020-12-21 PROCEDURE — 97161 PT EVAL LOW COMPLEX 20 MIN: CPT

## 2020-12-21 PROCEDURE — 97110 THERAPEUTIC EXERCISES: CPT

## 2020-12-21 PROCEDURE — 97140 MANUAL THERAPY 1/> REGIONS: CPT

## 2020-12-21 NOTE — PROGRESS NOTES
INR scheduled 11/18 missed. Patient recently admitted for shoulder arthroplasty of which we were not aware. INR at baseline so assume patient was instructed by someone to stop warfarin prior to procedure. Of note, patient experienced atrial fibrillation with RVR after procedure.

## 2020-12-21 NOTE — PROGRESS NOTES
Encounter created in error & signed for administrative purposes only.  Routed to Pharmd to review as pt was d/c'd 12/19/20

## 2020-12-21 NOTE — PLAN OF CARE
[facility]  Physical Therapy Initial Evaluation    Date: 12/21/2020   Name: Navjot Fine  [facility] Number: [id number]    Therapy Diagnosis:   Encounter Diagnoses   Name Primary?    Primary osteoarthritis of right shoulder     Biceps tendinitis of right shoulder      Physician: Andrea Montgomery, *    Physician Orders: PT Eval and Treat   Medical Diagnosis from Referral:   M19.011 (ICD-10-CM) - Primary osteoarthritis of right shoulder   M75.21 (ICD-10-CM) - Biceps tendinitis of right shoulder       1. Right Hemiarthroplasty 49014     2. Right shoulder Glenoid Resurfacing (Fascia Madeline Graft), Complex     3. Right shoulder Biceps tenodesis CPT - 12943     4. Right shoulder Arthroscopic labral debridement CPT - 61338  Evaluation Date: 12/21/2020  Authorization Period Expiration: 12/15/2021  Plan of Care Expiration: 3/30/2020  Visit # / Visits authorized: 1/ 1    Time In: 0840  Time Out: 0930  Total Appointment Time (timed & untimed codes): 50 minutes    Precautions: Standard     PHYSICAL THERAPY:   The patient should be held on physical therapy until 3-5 dayss following the surgery and using sling and abduction pillow at all times for 6 weeks. The patient should begin external rotation with the arm at side at 30 degrees, abduction to be performed to 60 degrees with NO internal and external rotation in abduction for 4 weeks. The patient should limit forward flexion at 90 degrees for 2 weeks following surgery. PROTECT SUBSCAPULARIS FOR 6 WEEKS.          RANGE OF MOTION:  Passive range of motion using pendulum exercises and Codman   exercises for the first two to three weeks.  Limitations of motion should be 90   degrees forward flexion, external rotation with the arm to side is 30 degrees   and limited internal and external rotation in abduction.  Abduction can be   performed to approximately 60 degrees for wound care.  The patient should   use a sling and abduction pillow to sleep and  with ambulation except for range   of motion.  The patient can take the arm out of sling and pillow for active   assisted and passive range of motion of the elbow and wrist.  Otherwise, the   patient should follow total shoulder protocol.    Subjective   Date of onset: 12/18/2020  History of current condition - Navjot reports: Has been feeling good since the surgery. Pain has been relatively controlled and has been following his precautions. Reports an inability to raise his arm overhead for 2 years leading up to the surgery. Had some troubles with his A-fib during the surgery but has not had any problems since then.     Pain:  Current 4/10, worst 5/10, best 3/10   Location: Right shoulder  Description: Aching  Aggravating Factors: constant  Easing Factors: ice    Pts goals: Regain functional use of RUE    Medical History:   Past Medical History:   Diagnosis Date    Atrial fibrillation     Cholelithiasis        Surgical History:   Navjot Fine  has a past surgical history that includes Total hip arthroplasty (Left, 2009) and Total hip arthroplasty (Right, 2014).    Medications:   Navjot has a current medication list which includes the following prescription(s): atenolol, b complex vitamins, celecoxib, diltiazem, fluconazole, luliconazole, oxycodone-acetaminophen, promethazine, tramadol, warfarin, warfarin, and zolpidem.    Allergies:   Review of patient's allergies indicates:   Allergen Reactions    No known allergies         Exercise Routine/Sports Participation: Independent exercise  Social History: Lives home with wife  Occupation: retired  Prior Level of Function: Unable to raise RUE overhead  Current Level of Function: Unable to raise RUE overhead    Objective     Posture: Right shoulder depressed and anterior, pt in shoulder sling with abduction pillow    Passive Range of Motion:   Shoulder Right   Flexion 80   Abduction 60   ER at 20 20   IR MA      Upper Extremity Strength:  Formal MMT not  performed 2/2 POD#3 and increased pain.        Palpation:  Mild warmth and tenderness as expected post-op.    Sensation: Intact but diminished 2/2 residual nerve block.            Limitation/Restriction for FOTO shoulder Survey    Therapist reviewed FOTO scores for Navjot Fine on 12/21/2020.   FOTO documents entered into [facility] - see Media section.    Limitation Score: 4%         TREATMENT   Treatment Time In: 0910  Treatment Time Out: 0930  Total Treatment time (time-based codes) separate from Evaluation: 20 minutes    Navjot received therapeutic exercises to develop strength, endurance, ROM and flexibility for 10 minutes including:  Table walk away flexion 20x  Table slide 20x   strength 30x    Navjot received the following manual therapy techniques: Joint mobilizations were applied to the: right shoulder for 10 minutes, including:  PROM shoulder and elbow ROM to protocol      Education provided:   - Post op precautions  - HEP    Written Home Exercises Provided: yes.  Exercises were reviewed and Navjot was able to demonstrate them prior to the end of the session.  Navjot demonstrated good  understanding of the education provided.     See EMR under Patient Instructions for exercises provided 12/21/2020.    Assessment   Navjot is a 70 y.o. male referred to outpatient Physical Therapy with a medical diagnosis of s/p right shoulder hemiarthroplasty and biceps tenodesis and . Pt presents with appropriate ROM for this stage of rehabilitation. Adjusted sling for proper use.     Pt prognosis is Excellent.   Pt will benefit from skilled outpatient Physical Therapy to address the deficits stated above and in the chart below, provide pt/family education, and to maximize pt's level of independence.     Plan of care discussed with patient: Yes  Pt's spiritual, cultural and educational needs considered and patient is agreeable to the plan of care and goals as stated below:     Anticipated Barriers for therapy:  None    Medical Necessity is demonstrated by the following  History  Co-morbidities and personal factors that may impact the plan of care Co-morbidities:   advanced age    Personal Factors:   no deficits     low   Examination  Body Structures and Functions, activity limitations and participation restrictions that may impact the plan of care Body Regions:   upper extremities    Body Systems:    gross symmetry  ROM  strength  gross coordinated movement  balance    Participation Restrictions:   NA    Activity limitations:   Learning and applying knowledge  No deficits    General Tasks and Commands  {No deficits    Communication  No deficits    Mobility  lifting and carrying objects    Self care  No deficits    Domestic Life  No deficits    Interactions/Relationships  No deficits    Life Areas  Unable to complete ADLs    Community and Social Life  No deficits         high   Clinical Presentation evolving clinical presentation with changing clinical characteristics moderate   Decision Making/ Complexity Score: moderate     GOALS: Short Term Goals:  6 weeks  1.Report decreased shoulder pain < / =  3/10  to increase tolerance for exercose  2. Increase PROM 90 degrees shoulder flexion   3. Increased strength by 1/3 MMT grade in gross shoulder girdle to increase tolerance for ADL and work activities.  4. Pt to tolerate HEP to improve ROM and independence with ADL's    Long Term Goals: 12 to 24 weeks  1.Report decreased shoulder pain  < / =  0 /10  to increase tolerance for ADLs  2.Increase AROM to equal the contralateral limb  3.Increase strength to >/= 4/5 in gross shoulder strengthening to increase tolerance for ADL and work activities.  4. Pt goal: Resume functional use of the RUE.   5. Pt will have improved gcode of CJ (20-40% limited) on FOTO shoulder in order to demonstrate true functional improvement.      Plan   Outpatient Physical Therapy 1 times weekly for 6 weeks then progress to 2 times weekly as protocol allows  for progression,  to include the following interventions: manual therapy, therapeutic exercise, therapeutic activities, and neuromuscular re-education.    Reagan Baca, PT, DPT

## 2020-12-22 ENCOUNTER — PATIENT MESSAGE (OUTPATIENT)
Dept: CARDIOLOGY | Facility: CLINIC | Age: 70
End: 2020-12-22

## 2020-12-23 ENCOUNTER — PATIENT MESSAGE (OUTPATIENT)
Dept: SPORTS MEDICINE | Facility: CLINIC | Age: 70
End: 2020-12-23

## 2020-12-23 NOTE — PROGRESS NOTES
MA notes reviewed. Patient should be advised not to change warfarin dose/stop taking without our knowledge. I do not advise waiting another 10 days for INR. Will schedule labs as soon as patient will allow.

## 2020-12-23 NOTE — PROGRESS NOTES
"Message received the following message from pt today "     i had shoulder sugery on dec 18th     before sugery, they wanted me off coumedin for a week.  i have been back on coumedin for 3 days---i will set up an appointment in 10 days         "

## 2020-12-24 NOTE — PROGRESS NOTES
Advised patient to go to the lab as soon as possible he refuses, he states when he is ready he will call us

## 2020-12-28 ENCOUNTER — CLINICAL SUPPORT (OUTPATIENT)
Dept: REHABILITATION | Facility: HOSPITAL | Age: 70
End: 2020-12-28
Payer: COMMERCIAL

## 2020-12-28 ENCOUNTER — TELEPHONE (OUTPATIENT)
Dept: SPORTS MEDICINE | Facility: CLINIC | Age: 70
End: 2020-12-28

## 2020-12-28 DIAGNOSIS — M25.511 ACUTE PAIN OF RIGHT SHOULDER: ICD-10-CM

## 2020-12-28 PROCEDURE — 97110 THERAPEUTIC EXERCISES: CPT

## 2020-12-28 PROCEDURE — 97140 MANUAL THERAPY 1/> REGIONS: CPT

## 2020-12-28 NOTE — TELEPHONE ENCOUNTER
M for patient to let him know Dr. Saeed would like to see him Monday 01/04/2021 instead of him seeing Karen Case at 12/31/2020. Rescheduled his appt.

## 2020-12-28 NOTE — PROGRESS NOTES
Physical Therapy Daily Treatment Note     Name: Navjot Fine  [facility] Number: [id number]    Therapy Diagnosis:   Encounter Diagnosis   Name Primary?    Acute pain of right shoulder      Physician: Andrea Montgomery, *    Visit Date: 12/28/2020  Physician Orders: PT Eval and Treat   Medical Diagnosis from Referral:   M19.011 (ICD-10-CM) - Primary osteoarthritis of right shoulder   M75.21 (ICD-10-CM) - Biceps tendinitis of right shoulder         1. Right Hemiarthroplasty 29038     2. Right shoulder Glenoid Resurfacing (Fascia Madeline Graft), Complex     3. Right shoulder Biceps tenodesis CPT - 57017     4. Right shoulder Arthroscopic labral debridement CPT - 18012  Evaluation Date: 12/21/2020  Authorization Period Expiration: 12/15/2021  Plan of Care Expiration: 3/30/2020  Visit # / Visits authorized: 1/ 1      Time In: 1341  Time Out: 1432  Total Billable Time: 39 minutes    Precautions: Standard   PHYSICAL THERAPY:   The patient should be held on physical therapy until 3-5 dayss following the surgery and using sling and abduction pillow at all times for 6 weeks. The patient should begin external rotation with the arm at side at 30 degrees, abduction to be performed to 60 degrees with NO internal and external rotation in abduction for 4 weeks. The patient should limit forward flexion at 90 degrees for 2 weeks following surgery. PROTECT SUBSCAPULARIS FOR 6 WEEKS.          RANGE OF MOTION:  Passive range of motion using pendulum exercises and Codman   exercises for the first two to three weeks.  Limitations of motion should be 90   degrees forward flexion, external rotation with the arm to side is 30 degrees   and limited internal and external rotation in abduction.  Abduction can be   performed to approximately 60 degrees for wound care.  The patient should   use a sling and abduction pillow to sleep and with ambulation except for range   of motion.  The patient can take the arm out of sling and pillow for  active   assisted and passive range of motion of the elbow and wrist.  Otherwise, the   patient should follow total shoulder protocol.    Subjective     Pt reports: Has not been having any shoulder pain. Is able to complete his HEP without issue.   He was compliant with home exercise program.  Response to previous treatment: Decreased pain  Functional change: NA    Pain: 1/10  Location: right shoulder      Objective   Navjot received therapeutic exercises to develop strength, endurance, ROM and flexibility for 25 minutes including:  Table walk away flexion 30x  Table slide 30x   strength 30x  Scapular retraction in mirror 30x     Navjot received the following manual therapy techniques: Joint mobilizations were applied to the: right shoulder for 14 minutes, including:  PROM shoulder and elbow ROM to protocol       Home Exercises Provided and Patient Education Provided     Education provided:   - Post op precautions  - HEP     Written Home Exercises Provided: yes.  Exercises were reviewed and Navjot was able to demonstrate them prior to the end of the session.  Navjot demonstrated good  understanding of the education provided.      See EMR under Patient Instructions for exercises provided 12/21/2020.    Assessment     Patient shows very good ROM for this stage of rehabilitation. Able to complete treatment without issue. Will slowly progress ROM as protocol allows.      Navjot is progressing well towards his goals.   Pt prognosis is Good.     Pt will continue to benefit from skilled outpatient physical therapy to address the deficits listed in the problem list box on initial evaluation, provide pt/family education and to maximize pt's level of independence in the home and community environment.     Pt's spiritual, cultural and educational needs considered and pt agreeable to plan of care and goals.    Anticipated barriers to physical therapy: none    GOALS: Short Term Goals:  6 weeks  1.Report decreased  shoulder pain < / =  3/10  to increase tolerance for exercose  2. Increase PROM 90 degrees shoulder flexion   3. Increased strength by 1/3 MMT grade in gross shoulder girdle to increase tolerance for ADL and work activities.  4. Pt to tolerate HEP to improve ROM and independence with ADL's     Long Term Goals: 12 to 24 weeks  1.Report decreased shoulder pain  < / =  0 /10  to increase tolerance for ADLs  2.Increase AROM to equal the contralateral limb  3.Increase strength to >/= 4/5 in gross shoulder strengthening to increase tolerance for ADL and work activities.  4. Pt goal: Resume functional use of the RUE.   5. Pt will have improved gcode of CJ (20-40% limited) on FOTO shoulder in order to demonstrate true functional improvement.       Plan   Outpatient Physical Therapy 1 times weekly for 6 weeks then progress to 2 times weekly as protocol allows for progression,  to include the following interventions: manual therapy, therapeutic exercise, therapeutic activities, and neuromuscular re-education.       Reagan Baca, PT

## 2020-12-29 ENCOUNTER — PATIENT MESSAGE (OUTPATIENT)
Dept: ADMINISTRATIVE | Facility: OTHER | Age: 70
End: 2020-12-29

## 2020-12-29 LAB
FINAL PATHOLOGIC DIAGNOSIS: NORMAL
Lab: NORMAL

## 2021-01-02 ENCOUNTER — PATIENT MESSAGE (OUTPATIENT)
Dept: INTERNAL MEDICINE | Facility: CLINIC | Age: 71
End: 2021-01-02

## 2021-01-03 RX ORDER — TAMSULOSIN HYDROCHLORIDE 0.4 MG/1
0.4 CAPSULE ORAL DAILY
Qty: 30 CAPSULE | Refills: 11 | Status: SHIPPED | OUTPATIENT
Start: 2021-01-03 | End: 2021-10-13

## 2021-01-04 ENCOUNTER — OFFICE VISIT (OUTPATIENT)
Dept: SPORTS MEDICINE | Facility: CLINIC | Age: 71
End: 2021-01-04
Payer: COMMERCIAL

## 2021-01-04 ENCOUNTER — PATIENT MESSAGE (OUTPATIENT)
Dept: INTERNAL MEDICINE | Facility: CLINIC | Age: 71
End: 2021-01-04

## 2021-01-04 VITALS — HEIGHT: 72 IN | WEIGHT: 184 LBS | BODY MASS INDEX: 24.92 KG/M2

## 2021-01-04 DIAGNOSIS — M75.21 BICEPS TENDINITIS OF RIGHT SHOULDER: ICD-10-CM

## 2021-01-04 DIAGNOSIS — M19.011 PRIMARY OSTEOARTHRITIS OF RIGHT SHOULDER: ICD-10-CM

## 2021-01-04 DIAGNOSIS — M25.511 ACUTE PAIN OF RIGHT SHOULDER: ICD-10-CM

## 2021-01-04 DIAGNOSIS — M19.111 POST-TRAUMATIC OSTEOARTHRITIS OF RIGHT SHOULDER: Primary | ICD-10-CM

## 2021-01-04 DIAGNOSIS — M19.019 ARTHRITIS OF SHOULDER: ICD-10-CM

## 2021-01-04 PROCEDURE — 1126F PR PAIN SEVERITY QUANTIFIED, NO PAIN PRESENT: ICD-10-PCS | Mod: S$GLB,,, | Performed by: ORTHOPAEDIC SURGERY

## 2021-01-04 PROCEDURE — 1101F PR PT FALLS ASSESS DOC 0-1 FALLS W/OUT INJ PAST YR: ICD-10-PCS | Mod: CPTII,S$GLB,, | Performed by: ORTHOPAEDIC SURGERY

## 2021-01-04 PROCEDURE — 99024 POSTOP FOLLOW-UP VISIT: CPT | Mod: S$GLB,,, | Performed by: ORTHOPAEDIC SURGERY

## 2021-01-04 PROCEDURE — 99999 PR PBB SHADOW E&M-EST. PATIENT-LVL IV: ICD-10-PCS | Mod: PBBFAC,,, | Performed by: ORTHOPAEDIC SURGERY

## 2021-01-04 PROCEDURE — 3008F BODY MASS INDEX DOCD: CPT | Mod: CPTII,S$GLB,, | Performed by: ORTHOPAEDIC SURGERY

## 2021-01-04 PROCEDURE — 1126F AMNT PAIN NOTED NONE PRSNT: CPT | Mod: S$GLB,,, | Performed by: ORTHOPAEDIC SURGERY

## 2021-01-04 PROCEDURE — 1101F PT FALLS ASSESS-DOCD LE1/YR: CPT | Mod: CPTII,S$GLB,, | Performed by: ORTHOPAEDIC SURGERY

## 2021-01-04 PROCEDURE — 3008F PR BODY MASS INDEX (BMI) DOCUMENTED: ICD-10-PCS | Mod: CPTII,S$GLB,, | Performed by: ORTHOPAEDIC SURGERY

## 2021-01-04 PROCEDURE — 99024 PR POST-OP FOLLOW-UP VISIT: ICD-10-PCS | Mod: S$GLB,,, | Performed by: ORTHOPAEDIC SURGERY

## 2021-01-04 PROCEDURE — 99999 PR PBB SHADOW E&M-EST. PATIENT-LVL IV: CPT | Mod: PBBFAC,,, | Performed by: ORTHOPAEDIC SURGERY

## 2021-01-04 PROCEDURE — 3288F PR FALLS RISK ASSESSMENT DOCUMENTED: ICD-10-PCS | Mod: CPTII,S$GLB,, | Performed by: ORTHOPAEDIC SURGERY

## 2021-01-04 PROCEDURE — 3288F FALL RISK ASSESSMENT DOCD: CPT | Mod: CPTII,S$GLB,, | Performed by: ORTHOPAEDIC SURGERY

## 2021-01-04 RX ORDER — TRAMADOL HYDROCHLORIDE 50 MG/1
50 TABLET ORAL
Qty: 30 TABLET | Refills: 0 | Status: SHIPPED | OUTPATIENT
Start: 2021-01-04 | End: 2021-02-03

## 2021-01-04 RX ORDER — OXYCODONE AND ACETAMINOPHEN 10; 325 MG/1; MG/1
1 TABLET ORAL EVERY 6 HOURS PRN
Qty: 30 TABLET | Refills: 0 | Status: SHIPPED | OUTPATIENT
Start: 2021-01-04 | End: 2021-02-18 | Stop reason: SDUPTHER

## 2021-01-08 ENCOUNTER — CLINICAL SUPPORT (OUTPATIENT)
Dept: REHABILITATION | Facility: HOSPITAL | Age: 71
End: 2021-01-08
Payer: COMMERCIAL

## 2021-01-08 DIAGNOSIS — M25.511 ACUTE PAIN OF RIGHT SHOULDER: ICD-10-CM

## 2021-01-08 PROCEDURE — 97140 MANUAL THERAPY 1/> REGIONS: CPT | Performed by: PHYSICAL THERAPIST

## 2021-01-08 PROCEDURE — 97110 THERAPEUTIC EXERCISES: CPT | Performed by: PHYSICAL THERAPIST

## 2021-01-15 ENCOUNTER — CLINICAL SUPPORT (OUTPATIENT)
Dept: REHABILITATION | Facility: HOSPITAL | Age: 71
End: 2021-01-15
Payer: COMMERCIAL

## 2021-01-15 DIAGNOSIS — M25.511 ACUTE PAIN OF RIGHT SHOULDER: ICD-10-CM

## 2021-01-15 PROCEDURE — 97110 THERAPEUTIC EXERCISES: CPT | Performed by: PHYSICAL THERAPIST

## 2021-01-15 PROCEDURE — 97140 MANUAL THERAPY 1/> REGIONS: CPT | Performed by: PHYSICAL THERAPIST

## 2021-01-16 ENCOUNTER — PATIENT MESSAGE (OUTPATIENT)
Dept: SPORTS MEDICINE | Facility: CLINIC | Age: 71
End: 2021-01-16

## 2021-01-19 DIAGNOSIS — Z96.611 S/P SHOULDER REPLACEMENT, RIGHT: Primary | ICD-10-CM

## 2021-01-19 RX ORDER — CELECOXIB 200 MG/1
200 CAPSULE ORAL 2 TIMES DAILY
Qty: 60 CAPSULE | Refills: 6 | Status: SHIPPED | OUTPATIENT
Start: 2021-01-19 | End: 2021-02-18

## 2021-01-20 ENCOUNTER — CLINICAL SUPPORT (OUTPATIENT)
Dept: REHABILITATION | Facility: HOSPITAL | Age: 71
End: 2021-01-20
Payer: COMMERCIAL

## 2021-01-20 DIAGNOSIS — M25.511 ACUTE PAIN OF RIGHT SHOULDER: ICD-10-CM

## 2021-01-20 PROCEDURE — 97140 MANUAL THERAPY 1/> REGIONS: CPT | Performed by: PHYSICAL THERAPIST

## 2021-01-20 PROCEDURE — 97110 THERAPEUTIC EXERCISES: CPT | Performed by: PHYSICAL THERAPIST

## 2021-01-21 ENCOUNTER — PATIENT MESSAGE (OUTPATIENT)
Dept: SPORTS MEDICINE | Facility: CLINIC | Age: 71
End: 2021-01-21

## 2021-01-21 ENCOUNTER — TELEPHONE (OUTPATIENT)
Dept: SPORTS MEDICINE | Facility: CLINIC | Age: 71
End: 2021-01-21

## 2021-01-25 ENCOUNTER — OFFICE VISIT (OUTPATIENT)
Dept: SPORTS MEDICINE | Facility: CLINIC | Age: 71
End: 2021-01-25
Payer: COMMERCIAL

## 2021-01-25 ENCOUNTER — CLINICAL SUPPORT (OUTPATIENT)
Dept: REHABILITATION | Facility: HOSPITAL | Age: 71
End: 2021-01-25
Payer: COMMERCIAL

## 2021-01-25 ENCOUNTER — HOSPITAL ENCOUNTER (OUTPATIENT)
Dept: RADIOLOGY | Facility: HOSPITAL | Age: 71
Discharge: HOME | End: 2021-01-25
Attending: ORTHOPAEDIC SURGERY
Payer: COMMERCIAL

## 2021-01-25 VITALS
BODY MASS INDEX: 24.92 KG/M2 | HEIGHT: 72 IN | HEART RATE: 88 BPM | WEIGHT: 184 LBS | DIASTOLIC BLOOD PRESSURE: 78 MMHG | SYSTOLIC BLOOD PRESSURE: 114 MMHG

## 2021-01-25 DIAGNOSIS — M25.511 RIGHT SHOULDER PAIN, UNSPECIFIED CHRONICITY: ICD-10-CM

## 2021-01-25 DIAGNOSIS — M19.019 ARTHRITIS OF SHOULDER: ICD-10-CM

## 2021-01-25 DIAGNOSIS — M19.111 POST-TRAUMATIC OSTEOARTHRITIS OF RIGHT SHOULDER: ICD-10-CM

## 2021-01-25 DIAGNOSIS — M19.011 PRIMARY OSTEOARTHRITIS OF RIGHT SHOULDER: ICD-10-CM

## 2021-01-25 DIAGNOSIS — M25.511 ACUTE PAIN OF RIGHT SHOULDER: ICD-10-CM

## 2021-01-25 DIAGNOSIS — M25.511 RIGHT SHOULDER PAIN, UNSPECIFIED CHRONICITY: Primary | ICD-10-CM

## 2021-01-25 PROCEDURE — 1126F PR PAIN SEVERITY QUANTIFIED, NO PAIN PRESENT: ICD-10-PCS | Mod: S$GLB,,, | Performed by: ORTHOPAEDIC SURGERY

## 2021-01-25 PROCEDURE — 97110 THERAPEUTIC EXERCISES: CPT | Performed by: PHYSICAL THERAPIST

## 2021-01-25 PROCEDURE — 99999 PR PBB SHADOW E&M-EST. PATIENT-LVL IV: CPT | Mod: PBBFAC,,, | Performed by: ORTHOPAEDIC SURGERY

## 2021-01-25 PROCEDURE — 99024 PR POST-OP FOLLOW-UP VISIT: ICD-10-PCS | Mod: S$GLB,,, | Performed by: ORTHOPAEDIC SURGERY

## 2021-01-25 PROCEDURE — 3288F FALL RISK ASSESSMENT DOCD: CPT | Mod: CPTII,S$GLB,, | Performed by: ORTHOPAEDIC SURGERY

## 2021-01-25 PROCEDURE — 1101F PR PT FALLS ASSESS DOC 0-1 FALLS W/OUT INJ PAST YR: ICD-10-PCS | Mod: CPTII,S$GLB,, | Performed by: ORTHOPAEDIC SURGERY

## 2021-01-25 PROCEDURE — 73030 X-RAY EXAM OF SHOULDER: CPT | Mod: 26,RT,, | Performed by: RADIOLOGY

## 2021-01-25 PROCEDURE — 3288F PR FALLS RISK ASSESSMENT DOCUMENTED: ICD-10-PCS | Mod: CPTII,S$GLB,, | Performed by: ORTHOPAEDIC SURGERY

## 2021-01-25 PROCEDURE — 73030 XR SHOULDER COMPLETE 2 OR MORE VIEWS RIGHT: ICD-10-PCS | Mod: 26,RT,, | Performed by: RADIOLOGY

## 2021-01-25 PROCEDURE — 3008F PR BODY MASS INDEX (BMI) DOCUMENTED: ICD-10-PCS | Mod: CPTII,S$GLB,, | Performed by: ORTHOPAEDIC SURGERY

## 2021-01-25 PROCEDURE — 73030 X-RAY EXAM OF SHOULDER: CPT | Mod: TC,RT

## 2021-01-25 PROCEDURE — 3008F BODY MASS INDEX DOCD: CPT | Mod: CPTII,S$GLB,, | Performed by: ORTHOPAEDIC SURGERY

## 2021-01-25 PROCEDURE — 97110 PR THERAPEUTIC EXERCISES: ICD-10-PCS | Mod: S$GLB,,, | Performed by: ORTHOPAEDIC SURGERY

## 2021-01-25 PROCEDURE — 97140 MANUAL THERAPY 1/> REGIONS: CPT | Performed by: PHYSICAL THERAPIST

## 2021-01-25 PROCEDURE — 97110 THERAPEUTIC EXERCISES: CPT | Mod: S$GLB,,, | Performed by: ORTHOPAEDIC SURGERY

## 2021-01-25 PROCEDURE — 1126F AMNT PAIN NOTED NONE PRSNT: CPT | Mod: S$GLB,,, | Performed by: ORTHOPAEDIC SURGERY

## 2021-01-25 PROCEDURE — 99024 POSTOP FOLLOW-UP VISIT: CPT | Mod: S$GLB,,, | Performed by: ORTHOPAEDIC SURGERY

## 2021-01-25 PROCEDURE — 1101F PT FALLS ASSESS-DOCD LE1/YR: CPT | Mod: CPTII,S$GLB,, | Performed by: ORTHOPAEDIC SURGERY

## 2021-01-25 PROCEDURE — 99999 PR PBB SHADOW E&M-EST. PATIENT-LVL IV: ICD-10-PCS | Mod: PBBFAC,,, | Performed by: ORTHOPAEDIC SURGERY

## 2021-01-25 RX ORDER — CELECOXIB 200 MG/1
200 CAPSULE ORAL 2 TIMES DAILY
Qty: 60 CAPSULE | Refills: 6 | Status: SHIPPED | OUTPATIENT
Start: 2021-01-25 | End: 2021-02-18 | Stop reason: SDUPTHER

## 2021-01-25 RX ORDER — METHOCARBAMOL 500 MG/1
500 TABLET, FILM COATED ORAL 3 TIMES DAILY
Qty: 90 TABLET | Refills: 0 | Status: SHIPPED | OUTPATIENT
Start: 2021-01-25 | End: 2021-02-24

## 2021-02-03 ENCOUNTER — CLINICAL SUPPORT (OUTPATIENT)
Dept: REHABILITATION | Facility: HOSPITAL | Age: 71
End: 2021-02-03
Payer: COMMERCIAL

## 2021-02-03 DIAGNOSIS — M25.511 ACUTE PAIN OF RIGHT SHOULDER: ICD-10-CM

## 2021-02-03 PROCEDURE — 97110 THERAPEUTIC EXERCISES: CPT | Performed by: PHYSICAL THERAPIST

## 2021-02-03 PROCEDURE — 97140 MANUAL THERAPY 1/> REGIONS: CPT | Performed by: PHYSICAL THERAPIST

## 2021-02-05 ENCOUNTER — CLINICAL SUPPORT (OUTPATIENT)
Dept: REHABILITATION | Facility: HOSPITAL | Age: 71
End: 2021-02-05
Payer: COMMERCIAL

## 2021-02-05 DIAGNOSIS — M25.511 ACUTE PAIN OF RIGHT SHOULDER: ICD-10-CM

## 2021-02-05 PROCEDURE — 97112 NEUROMUSCULAR REEDUCATION: CPT | Performed by: PHYSICAL THERAPIST

## 2021-02-05 PROCEDURE — 97110 THERAPEUTIC EXERCISES: CPT | Performed by: PHYSICAL THERAPIST

## 2021-02-05 PROCEDURE — 97140 MANUAL THERAPY 1/> REGIONS: CPT | Performed by: PHYSICAL THERAPIST

## 2021-02-08 ENCOUNTER — TELEPHONE (OUTPATIENT)
Dept: SPORTS MEDICINE | Facility: CLINIC | Age: 71
End: 2021-02-08

## 2021-02-10 ENCOUNTER — CLINICAL SUPPORT (OUTPATIENT)
Dept: REHABILITATION | Facility: HOSPITAL | Age: 71
End: 2021-02-10
Payer: COMMERCIAL

## 2021-02-10 DIAGNOSIS — M25.511 ACUTE PAIN OF RIGHT SHOULDER: ICD-10-CM

## 2021-02-10 PROCEDURE — 97140 MANUAL THERAPY 1/> REGIONS: CPT | Performed by: PHYSICAL THERAPIST

## 2021-02-10 PROCEDURE — 97110 THERAPEUTIC EXERCISES: CPT | Performed by: PHYSICAL THERAPIST

## 2021-02-10 PROCEDURE — 97112 NEUROMUSCULAR REEDUCATION: CPT | Performed by: PHYSICAL THERAPIST

## 2021-02-12 ENCOUNTER — CLINICAL SUPPORT (OUTPATIENT)
Dept: REHABILITATION | Facility: HOSPITAL | Age: 71
End: 2021-02-12
Payer: COMMERCIAL

## 2021-02-12 DIAGNOSIS — M25.511 ACUTE PAIN OF RIGHT SHOULDER: ICD-10-CM

## 2021-02-12 PROCEDURE — 97140 MANUAL THERAPY 1/> REGIONS: CPT | Performed by: PHYSICAL THERAPIST

## 2021-02-12 PROCEDURE — 97112 NEUROMUSCULAR REEDUCATION: CPT | Performed by: PHYSICAL THERAPIST

## 2021-02-12 PROCEDURE — 97110 THERAPEUTIC EXERCISES: CPT | Performed by: PHYSICAL THERAPIST

## 2021-02-15 ENCOUNTER — CLINICAL SUPPORT (OUTPATIENT)
Dept: REHABILITATION | Facility: HOSPITAL | Age: 71
End: 2021-02-15
Payer: COMMERCIAL

## 2021-02-15 DIAGNOSIS — M25.511 ACUTE PAIN OF RIGHT SHOULDER: ICD-10-CM

## 2021-02-15 PROCEDURE — 97112 NEUROMUSCULAR REEDUCATION: CPT | Performed by: PHYSICAL THERAPIST

## 2021-02-15 PROCEDURE — 97140 MANUAL THERAPY 1/> REGIONS: CPT | Performed by: PHYSICAL THERAPIST

## 2021-02-15 PROCEDURE — 97110 THERAPEUTIC EXERCISES: CPT | Performed by: PHYSICAL THERAPIST

## 2021-02-18 ENCOUNTER — PATIENT MESSAGE (OUTPATIENT)
Dept: SPORTS MEDICINE | Facility: CLINIC | Age: 71
End: 2021-02-18

## 2021-02-18 DIAGNOSIS — Z96.611 S/P SHOULDER REPLACEMENT, RIGHT: ICD-10-CM

## 2021-02-18 DIAGNOSIS — M25.511 RIGHT SHOULDER PAIN, UNSPECIFIED CHRONICITY: ICD-10-CM

## 2021-02-18 DIAGNOSIS — M19.111 POST-TRAUMATIC OSTEOARTHRITIS OF RIGHT SHOULDER: ICD-10-CM

## 2021-02-18 DIAGNOSIS — M19.019 ARTHRITIS OF SHOULDER: ICD-10-CM

## 2021-02-18 DIAGNOSIS — M19.011 PRIMARY OSTEOARTHRITIS OF RIGHT SHOULDER: ICD-10-CM

## 2021-02-18 DIAGNOSIS — M25.511 ACUTE PAIN OF RIGHT SHOULDER: ICD-10-CM

## 2021-02-18 DIAGNOSIS — M75.21 BICEPS TENDINITIS OF RIGHT SHOULDER: ICD-10-CM

## 2021-02-18 RX ORDER — CELECOXIB 200 MG/1
200 CAPSULE ORAL 2 TIMES DAILY
Qty: 60 CAPSULE | Refills: 6 | Status: SHIPPED | OUTPATIENT
Start: 2021-02-18 | End: 2021-03-08 | Stop reason: SDUPTHER

## 2021-02-18 RX ORDER — OXYCODONE AND ACETAMINOPHEN 10; 325 MG/1; MG/1
1 TABLET ORAL
Qty: 14 TABLET | Refills: 0 | Status: SHIPPED | OUTPATIENT
Start: 2021-02-18 | End: 2021-10-13

## 2021-02-18 RX ORDER — TRAMADOL HYDROCHLORIDE 50 MG/1
50 TABLET ORAL
Qty: 14 TABLET | Refills: 0 | Status: SHIPPED | OUTPATIENT
Start: 2021-02-18 | End: 2021-10-13

## 2021-02-19 ENCOUNTER — CLINICAL SUPPORT (OUTPATIENT)
Dept: REHABILITATION | Facility: HOSPITAL | Age: 71
End: 2021-02-19
Payer: COMMERCIAL

## 2021-02-19 DIAGNOSIS — M25.511 ACUTE PAIN OF RIGHT SHOULDER: ICD-10-CM

## 2021-02-19 PROCEDURE — 97112 NEUROMUSCULAR REEDUCATION: CPT | Performed by: PHYSICAL THERAPIST

## 2021-02-19 PROCEDURE — 97110 THERAPEUTIC EXERCISES: CPT | Performed by: PHYSICAL THERAPIST

## 2021-02-19 PROCEDURE — 97140 MANUAL THERAPY 1/> REGIONS: CPT | Performed by: PHYSICAL THERAPIST

## 2021-02-20 ENCOUNTER — PATIENT MESSAGE (OUTPATIENT)
Dept: SPORTS MEDICINE | Facility: CLINIC | Age: 71
End: 2021-02-20

## 2021-02-22 ENCOUNTER — CLINICAL SUPPORT (OUTPATIENT)
Dept: REHABILITATION | Facility: HOSPITAL | Age: 71
End: 2021-02-22
Payer: COMMERCIAL

## 2021-02-22 DIAGNOSIS — M25.511 ACUTE PAIN OF RIGHT SHOULDER: ICD-10-CM

## 2021-02-22 PROCEDURE — 97110 THERAPEUTIC EXERCISES: CPT | Performed by: PHYSICAL THERAPIST

## 2021-02-22 PROCEDURE — 97140 MANUAL THERAPY 1/> REGIONS: CPT | Performed by: PHYSICAL THERAPIST

## 2021-02-22 PROCEDURE — 97112 NEUROMUSCULAR REEDUCATION: CPT | Performed by: PHYSICAL THERAPIST

## 2021-02-24 ENCOUNTER — TELEPHONE (OUTPATIENT)
Dept: SPORTS MEDICINE | Facility: CLINIC | Age: 71
End: 2021-02-24

## 2021-02-26 ENCOUNTER — CLINICAL SUPPORT (OUTPATIENT)
Dept: REHABILITATION | Facility: HOSPITAL | Age: 71
End: 2021-02-26
Payer: COMMERCIAL

## 2021-02-26 DIAGNOSIS — M25.511 ACUTE PAIN OF RIGHT SHOULDER: ICD-10-CM

## 2021-02-26 PROCEDURE — 97110 THERAPEUTIC EXERCISES: CPT

## 2021-02-26 PROCEDURE — 97140 MANUAL THERAPY 1/> REGIONS: CPT

## 2021-03-01 ENCOUNTER — CLINICAL SUPPORT (OUTPATIENT)
Dept: REHABILITATION | Facility: HOSPITAL | Age: 71
End: 2021-03-01
Payer: COMMERCIAL

## 2021-03-01 DIAGNOSIS — M25.511 ACUTE PAIN OF RIGHT SHOULDER: ICD-10-CM

## 2021-03-01 PROCEDURE — 97112 NEUROMUSCULAR REEDUCATION: CPT | Performed by: PHYSICAL THERAPIST

## 2021-03-01 PROCEDURE — 97140 MANUAL THERAPY 1/> REGIONS: CPT | Performed by: PHYSICAL THERAPIST

## 2021-03-01 PROCEDURE — 97110 THERAPEUTIC EXERCISES: CPT | Performed by: PHYSICAL THERAPIST

## 2021-03-05 ENCOUNTER — CLINICAL SUPPORT (OUTPATIENT)
Dept: REHABILITATION | Facility: HOSPITAL | Age: 71
End: 2021-03-05
Payer: COMMERCIAL

## 2021-03-05 DIAGNOSIS — M25.511 ACUTE PAIN OF RIGHT SHOULDER: ICD-10-CM

## 2021-03-05 PROCEDURE — 97110 THERAPEUTIC EXERCISES: CPT

## 2021-03-05 PROCEDURE — 97112 NEUROMUSCULAR REEDUCATION: CPT

## 2021-03-08 ENCOUNTER — HOSPITAL ENCOUNTER (OUTPATIENT)
Dept: RADIOLOGY | Facility: HOSPITAL | Age: 71
Discharge: HOME | End: 2021-03-08
Attending: ORTHOPAEDIC SURGERY
Payer: COMMERCIAL

## 2021-03-08 ENCOUNTER — OFFICE VISIT (OUTPATIENT)
Dept: SPORTS MEDICINE | Facility: CLINIC | Age: 71
End: 2021-03-08
Payer: COMMERCIAL

## 2021-03-08 VITALS
HEIGHT: 72 IN | SYSTOLIC BLOOD PRESSURE: 119 MMHG | DIASTOLIC BLOOD PRESSURE: 83 MMHG | HEART RATE: 76 BPM | WEIGHT: 185 LBS | RESPIRATION RATE: 18 BRPM | BODY MASS INDEX: 25.06 KG/M2

## 2021-03-08 DIAGNOSIS — M19.012 PRIMARY OSTEOARTHRITIS OF LEFT SHOULDER: ICD-10-CM

## 2021-03-08 DIAGNOSIS — M25.512 CHRONIC LEFT SHOULDER PAIN: ICD-10-CM

## 2021-03-08 DIAGNOSIS — M19.011 PRIMARY OSTEOARTHRITIS OF RIGHT SHOULDER: ICD-10-CM

## 2021-03-08 DIAGNOSIS — Z96.611 S/P SHOULDER REPLACEMENT, RIGHT: ICD-10-CM

## 2021-03-08 DIAGNOSIS — M25.511 ACUTE PAIN OF RIGHT SHOULDER: ICD-10-CM

## 2021-03-08 DIAGNOSIS — M75.21 BICEPS TENDINITIS OF RIGHT SHOULDER: ICD-10-CM

## 2021-03-08 DIAGNOSIS — G89.29 CHRONIC LEFT SHOULDER PAIN: ICD-10-CM

## 2021-03-08 DIAGNOSIS — M19.019 ARTHRITIS OF SHOULDER: ICD-10-CM

## 2021-03-08 DIAGNOSIS — M19.111 POST-TRAUMATIC OSTEOARTHRITIS OF RIGHT SHOULDER: ICD-10-CM

## 2021-03-08 DIAGNOSIS — M25.511 RIGHT SHOULDER PAIN, UNSPECIFIED CHRONICITY: Primary | ICD-10-CM

## 2021-03-08 DIAGNOSIS — M25.511 RIGHT SHOULDER PAIN, UNSPECIFIED CHRONICITY: ICD-10-CM

## 2021-03-08 PROCEDURE — 73030 XR SHOULDER COMPLETE 2 OR MORE VIEWS RIGHT: ICD-10-PCS | Mod: 26,RT,, | Performed by: RADIOLOGY

## 2021-03-08 PROCEDURE — 73030 X-RAY EXAM OF SHOULDER: CPT | Mod: 26,RT,, | Performed by: RADIOLOGY

## 2021-03-08 PROCEDURE — 3008F PR BODY MASS INDEX (BMI) DOCUMENTED: ICD-10-PCS | Mod: CPTII,S$GLB,, | Performed by: ORTHOPAEDIC SURGERY

## 2021-03-08 PROCEDURE — 73030 X-RAY EXAM OF SHOULDER: CPT | Mod: TC,RT

## 2021-03-08 PROCEDURE — 1126F PR PAIN SEVERITY QUANTIFIED, NO PAIN PRESENT: ICD-10-PCS | Mod: S$GLB,,, | Performed by: ORTHOPAEDIC SURGERY

## 2021-03-08 PROCEDURE — 99024 PR POST-OP FOLLOW-UP VISIT: ICD-10-PCS | Mod: S$GLB,,, | Performed by: ORTHOPAEDIC SURGERY

## 2021-03-08 PROCEDURE — 97110 THERAPEUTIC EXERCISES: CPT | Mod: S$GLB,,, | Performed by: ORTHOPAEDIC SURGERY

## 2021-03-08 PROCEDURE — 97110 PR THERAPEUTIC EXERCISES: ICD-10-PCS | Mod: S$GLB,,, | Performed by: ORTHOPAEDIC SURGERY

## 2021-03-08 PROCEDURE — 99999 PR PBB SHADOW E&M-EST. PATIENT-LVL IV: ICD-10-PCS | Mod: PBBFAC,,, | Performed by: ORTHOPAEDIC SURGERY

## 2021-03-08 PROCEDURE — 1126F AMNT PAIN NOTED NONE PRSNT: CPT | Mod: S$GLB,,, | Performed by: ORTHOPAEDIC SURGERY

## 2021-03-08 PROCEDURE — 3008F BODY MASS INDEX DOCD: CPT | Mod: CPTII,S$GLB,, | Performed by: ORTHOPAEDIC SURGERY

## 2021-03-08 PROCEDURE — 99024 POSTOP FOLLOW-UP VISIT: CPT | Mod: S$GLB,,, | Performed by: ORTHOPAEDIC SURGERY

## 2021-03-08 PROCEDURE — 99999 PR PBB SHADOW E&M-EST. PATIENT-LVL IV: CPT | Mod: PBBFAC,,, | Performed by: ORTHOPAEDIC SURGERY

## 2021-03-08 RX ORDER — CELECOXIB 200 MG/1
200 CAPSULE ORAL 2 TIMES DAILY
Qty: 60 CAPSULE | Refills: 6 | Status: SHIPPED | OUTPATIENT
Start: 2021-03-08 | End: 2021-04-07

## 2021-03-10 ENCOUNTER — CLINICAL SUPPORT (OUTPATIENT)
Dept: REHABILITATION | Facility: HOSPITAL | Age: 71
End: 2021-03-10
Attending: ORTHOPAEDIC SURGERY
Payer: COMMERCIAL

## 2021-03-10 DIAGNOSIS — G89.29 CHRONIC RIGHT SHOULDER PAIN: ICD-10-CM

## 2021-03-10 DIAGNOSIS — M25.511 CHRONIC RIGHT SHOULDER PAIN: ICD-10-CM

## 2021-03-10 PROCEDURE — 97110 THERAPEUTIC EXERCISES: CPT

## 2021-03-10 PROCEDURE — 97140 MANUAL THERAPY 1/> REGIONS: CPT

## 2021-03-15 ENCOUNTER — HOSPITAL ENCOUNTER (OUTPATIENT)
Dept: RADIOLOGY | Facility: HOSPITAL | Age: 71
Discharge: HOME | End: 2021-03-15
Attending: ORTHOPAEDIC SURGERY
Payer: COMMERCIAL

## 2021-03-15 DIAGNOSIS — M25.512 CHRONIC LEFT SHOULDER PAIN: ICD-10-CM

## 2021-03-15 DIAGNOSIS — M19.012 PRIMARY OSTEOARTHRITIS OF LEFT SHOULDER: ICD-10-CM

## 2021-03-15 DIAGNOSIS — G89.29 CHRONIC LEFT SHOULDER PAIN: ICD-10-CM

## 2021-03-15 PROCEDURE — 73221 MRI SHOULDER WITHOUT CONTRAST LEFT: ICD-10-PCS | Mod: 26,LT,, | Performed by: RADIOLOGY

## 2021-03-15 PROCEDURE — 73221 MRI JOINT UPR EXTREM W/O DYE: CPT | Mod: 26,LT,, | Performed by: RADIOLOGY

## 2021-03-15 PROCEDURE — 73221 MRI JOINT UPR EXTREM W/O DYE: CPT | Mod: TC,LT

## 2021-03-16 ENCOUNTER — PATIENT MESSAGE (OUTPATIENT)
Dept: SPORTS MEDICINE | Facility: CLINIC | Age: 71
End: 2021-03-16

## 2021-03-17 ENCOUNTER — CLINICAL SUPPORT (OUTPATIENT)
Dept: REHABILITATION | Facility: HOSPITAL | Age: 71
End: 2021-03-17
Payer: COMMERCIAL

## 2021-03-17 DIAGNOSIS — M25.511 CHRONIC RIGHT SHOULDER PAIN: ICD-10-CM

## 2021-03-17 DIAGNOSIS — G89.29 CHRONIC RIGHT SHOULDER PAIN: ICD-10-CM

## 2021-03-17 PROCEDURE — 97112 NEUROMUSCULAR REEDUCATION: CPT

## 2021-03-17 PROCEDURE — 97110 THERAPEUTIC EXERCISES: CPT

## 2021-03-17 PROCEDURE — 97140 MANUAL THERAPY 1/> REGIONS: CPT

## 2021-03-24 ENCOUNTER — CLINICAL SUPPORT (OUTPATIENT)
Dept: REHABILITATION | Facility: HOSPITAL | Age: 71
End: 2021-03-24
Payer: COMMERCIAL

## 2021-03-24 DIAGNOSIS — G89.29 CHRONIC RIGHT SHOULDER PAIN: ICD-10-CM

## 2021-03-24 DIAGNOSIS — M25.511 CHRONIC RIGHT SHOULDER PAIN: ICD-10-CM

## 2021-03-24 PROCEDURE — 97112 NEUROMUSCULAR REEDUCATION: CPT

## 2021-03-24 PROCEDURE — 97110 THERAPEUTIC EXERCISES: CPT

## 2021-03-24 PROCEDURE — 97140 MANUAL THERAPY 1/> REGIONS: CPT

## 2021-03-26 ENCOUNTER — CLINICAL SUPPORT (OUTPATIENT)
Dept: REHABILITATION | Facility: HOSPITAL | Age: 71
End: 2021-03-26
Payer: COMMERCIAL

## 2021-03-26 DIAGNOSIS — M25.511 CHRONIC RIGHT SHOULDER PAIN: ICD-10-CM

## 2021-03-26 DIAGNOSIS — G89.29 CHRONIC RIGHT SHOULDER PAIN: ICD-10-CM

## 2021-03-26 PROCEDURE — 97112 NEUROMUSCULAR REEDUCATION: CPT | Performed by: PHYSICAL THERAPIST

## 2021-03-26 PROCEDURE — 97140 MANUAL THERAPY 1/> REGIONS: CPT | Performed by: PHYSICAL THERAPIST

## 2021-03-26 PROCEDURE — 97110 THERAPEUTIC EXERCISES: CPT | Performed by: PHYSICAL THERAPIST

## 2021-03-29 ENCOUNTER — PATIENT MESSAGE (OUTPATIENT)
Dept: SPORTS MEDICINE | Facility: CLINIC | Age: 71
End: 2021-03-29

## 2021-03-29 ENCOUNTER — OFFICE VISIT (OUTPATIENT)
Dept: SPORTS MEDICINE | Facility: CLINIC | Age: 71
End: 2021-03-29
Payer: COMMERCIAL

## 2021-03-29 ENCOUNTER — CLINICAL SUPPORT (OUTPATIENT)
Dept: REHABILITATION | Facility: HOSPITAL | Age: 71
End: 2021-03-29
Payer: COMMERCIAL

## 2021-03-29 DIAGNOSIS — M19.019 ARTHRITIS OF SHOULDER: ICD-10-CM

## 2021-03-29 DIAGNOSIS — M19.011 PRIMARY OSTEOARTHRITIS OF RIGHT SHOULDER: ICD-10-CM

## 2021-03-29 DIAGNOSIS — M25.511 CHRONIC RIGHT SHOULDER PAIN: ICD-10-CM

## 2021-03-29 DIAGNOSIS — G89.29 CHRONIC RIGHT SHOULDER PAIN: ICD-10-CM

## 2021-03-29 DIAGNOSIS — M19.111 POST-TRAUMATIC OSTEOARTHRITIS OF RIGHT SHOULDER: Primary | ICD-10-CM

## 2021-03-29 DIAGNOSIS — M19.012 PRIMARY OSTEOARTHRITIS OF LEFT SHOULDER: ICD-10-CM

## 2021-03-29 PROBLEM — M75.111 PARTIAL NONTRAUMATIC RUPTURE OF RIGHT ROTATOR CUFF: Status: RESOLVED | Noted: 2020-09-25 | Resolved: 2021-03-29

## 2021-03-29 PROCEDURE — 97112 NEUROMUSCULAR REEDUCATION: CPT | Performed by: PHYSICAL THERAPIST

## 2021-03-29 PROCEDURE — 99214 PR OFFICE/OUTPT VISIT, EST, LEVL IV, 30-39 MIN: ICD-10-PCS | Mod: 95,,, | Performed by: ORTHOPAEDIC SURGERY

## 2021-03-29 PROCEDURE — 1159F PR MEDICATION LIST DOCUMENTED IN MEDICAL RECORD: ICD-10-PCS | Mod: ,,, | Performed by: ORTHOPAEDIC SURGERY

## 2021-03-29 PROCEDURE — 97140 MANUAL THERAPY 1/> REGIONS: CPT | Performed by: PHYSICAL THERAPIST

## 2021-03-29 PROCEDURE — 1159F MED LIST DOCD IN RCRD: CPT | Mod: ,,, | Performed by: ORTHOPAEDIC SURGERY

## 2021-03-29 PROCEDURE — 97110 THERAPEUTIC EXERCISES: CPT | Performed by: PHYSICAL THERAPIST

## 2021-03-29 PROCEDURE — 99214 OFFICE O/P EST MOD 30 MIN: CPT | Mod: 95,,, | Performed by: ORTHOPAEDIC SURGERY

## 2021-04-19 ENCOUNTER — CLINICAL SUPPORT (OUTPATIENT)
Dept: REHABILITATION | Facility: HOSPITAL | Age: 71
End: 2021-04-19
Payer: COMMERCIAL

## 2021-04-19 DIAGNOSIS — G89.29 CHRONIC RIGHT SHOULDER PAIN: ICD-10-CM

## 2021-04-19 DIAGNOSIS — M25.511 CHRONIC RIGHT SHOULDER PAIN: ICD-10-CM

## 2021-04-19 PROCEDURE — 97110 THERAPEUTIC EXERCISES: CPT | Performed by: PHYSICAL THERAPIST

## 2021-04-19 PROCEDURE — 97140 MANUAL THERAPY 1/> REGIONS: CPT | Performed by: PHYSICAL THERAPIST

## 2021-04-26 ENCOUNTER — OFFICE VISIT (OUTPATIENT)
Dept: SPORTS MEDICINE | Facility: CLINIC | Age: 71
End: 2021-04-26
Payer: COMMERCIAL

## 2021-04-26 VITALS
DIASTOLIC BLOOD PRESSURE: 66 MMHG | HEART RATE: 95 BPM | SYSTOLIC BLOOD PRESSURE: 111 MMHG | BODY MASS INDEX: 25.06 KG/M2 | HEIGHT: 72 IN | WEIGHT: 185 LBS

## 2021-04-26 DIAGNOSIS — M19.012 PRIMARY OSTEOARTHRITIS OF LEFT SHOULDER: Primary | ICD-10-CM

## 2021-04-26 PROCEDURE — 1126F AMNT PAIN NOTED NONE PRSNT: CPT | Mod: S$GLB,,, | Performed by: ORTHOPAEDIC SURGERY

## 2021-04-26 PROCEDURE — 3008F BODY MASS INDEX DOCD: CPT | Mod: CPTII,S$GLB,, | Performed by: ORTHOPAEDIC SURGERY

## 2021-04-26 PROCEDURE — 99999 PR PBB SHADOW E&M-EST. PATIENT-LVL III: CPT | Mod: PBBFAC,,, | Performed by: ORTHOPAEDIC SURGERY

## 2021-04-26 PROCEDURE — 99499 UNLISTED E&M SERVICE: CPT | Mod: S$GLB,,, | Performed by: ORTHOPAEDIC SURGERY

## 2021-04-26 PROCEDURE — 99999 PR PBB SHADOW E&M-EST. PATIENT-LVL III: ICD-10-PCS | Mod: PBBFAC,,, | Performed by: ORTHOPAEDIC SURGERY

## 2021-04-26 PROCEDURE — 17999 UNLISTD PX SKN MUC MEMB SUBQ: CPT | Mod: ,,, | Performed by: ORTHOPAEDIC SURGERY

## 2021-04-26 PROCEDURE — 17999 PR APPL AMNIOTIC TISSUE UMB CORD GRAFT: ICD-10-PCS | Mod: ,,, | Performed by: ORTHOPAEDIC SURGERY

## 2021-04-26 PROCEDURE — 3008F PR BODY MASS INDEX (BMI) DOCUMENTED: ICD-10-PCS | Mod: CPTII,S$GLB,, | Performed by: ORTHOPAEDIC SURGERY

## 2021-04-26 PROCEDURE — 99499 NO LOS: ICD-10-PCS | Mod: S$GLB,,, | Performed by: ORTHOPAEDIC SURGERY

## 2021-04-26 PROCEDURE — 1126F PR PAIN SEVERITY QUANTIFIED, NO PAIN PRESENT: ICD-10-PCS | Mod: S$GLB,,, | Performed by: ORTHOPAEDIC SURGERY

## 2021-05-03 ENCOUNTER — CLINICAL SUPPORT (OUTPATIENT)
Dept: REHABILITATION | Facility: HOSPITAL | Age: 71
End: 2021-05-03
Payer: COMMERCIAL

## 2021-05-03 DIAGNOSIS — G89.29 CHRONIC RIGHT SHOULDER PAIN: ICD-10-CM

## 2021-05-03 DIAGNOSIS — M25.511 CHRONIC RIGHT SHOULDER PAIN: ICD-10-CM

## 2021-05-03 PROCEDURE — 97140 MANUAL THERAPY 1/> REGIONS: CPT | Performed by: PHYSICAL THERAPIST

## 2021-05-03 PROCEDURE — 97110 THERAPEUTIC EXERCISES: CPT | Performed by: PHYSICAL THERAPIST

## 2021-05-04 ENCOUNTER — PATIENT MESSAGE (OUTPATIENT)
Dept: SPORTS MEDICINE | Facility: CLINIC | Age: 71
End: 2021-05-04

## 2021-05-07 ENCOUNTER — CLINICAL SUPPORT (OUTPATIENT)
Dept: REHABILITATION | Facility: HOSPITAL | Age: 71
End: 2021-05-07
Payer: COMMERCIAL

## 2021-05-07 DIAGNOSIS — M25.511 CHRONIC RIGHT SHOULDER PAIN: ICD-10-CM

## 2021-05-07 DIAGNOSIS — G89.29 CHRONIC RIGHT SHOULDER PAIN: ICD-10-CM

## 2021-05-07 PROCEDURE — 97140 MANUAL THERAPY 1/> REGIONS: CPT | Performed by: PHYSICAL THERAPIST

## 2021-05-07 PROCEDURE — 97110 THERAPEUTIC EXERCISES: CPT | Performed by: PHYSICAL THERAPIST

## 2021-05-22 ENCOUNTER — PATIENT MESSAGE (OUTPATIENT)
Dept: INTERNAL MEDICINE | Facility: CLINIC | Age: 71
End: 2021-05-22

## 2021-05-22 DIAGNOSIS — H43.399 VITREOUS FLOATERS, UNSPECIFIED LATERALITY: Primary | ICD-10-CM

## 2021-05-24 ENCOUNTER — PATIENT MESSAGE (OUTPATIENT)
Dept: INTERNAL MEDICINE | Facility: CLINIC | Age: 71
End: 2021-05-24

## 2021-06-17 ENCOUNTER — PATIENT MESSAGE (OUTPATIENT)
Dept: CARDIOTHORACIC SURGERY | Facility: CLINIC | Age: 71
End: 2021-06-17

## 2021-06-17 DIAGNOSIS — I35.8 OTHER NONRHEUMATIC AORTIC VALVE DISORDERS: ICD-10-CM

## 2021-06-18 ENCOUNTER — PATIENT MESSAGE (OUTPATIENT)
Dept: SPORTS MEDICINE | Facility: CLINIC | Age: 71
End: 2021-06-18

## 2021-07-16 DIAGNOSIS — I49.8 OTHER SPECIFIED CARDIAC ARRHYTHMIAS: Primary | ICD-10-CM

## 2021-09-09 ENCOUNTER — TELEPHONE (OUTPATIENT)
Dept: CARDIOTHORACIC SURGERY | Facility: CLINIC | Age: 71
End: 2021-09-09

## 2021-10-11 ENCOUNTER — TELEPHONE (OUTPATIENT)
Dept: RESEARCH | Facility: HOSPITAL | Age: 71
End: 2021-10-11

## 2021-10-12 ENCOUNTER — PATIENT OUTREACH (OUTPATIENT)
Dept: ADMINISTRATIVE | Facility: OTHER | Age: 71
End: 2021-10-12

## 2021-10-12 ENCOUNTER — HOSPITAL ENCOUNTER (OUTPATIENT)
Dept: CARDIOLOGY | Facility: HOSPITAL | Age: 71
Discharge: HOME | End: 2021-10-12
Attending: THORACIC SURGERY (CARDIOTHORACIC VASCULAR SURGERY)
Payer: COMMERCIAL

## 2021-10-12 ENCOUNTER — OFFICE VISIT (OUTPATIENT)
Dept: CARDIOTHORACIC SURGERY | Facility: CLINIC | Age: 71
End: 2021-10-12
Attending: THORACIC SURGERY (CARDIOTHORACIC VASCULAR SURGERY)
Payer: COMMERCIAL

## 2021-10-12 ENCOUNTER — HOSPITAL ENCOUNTER (OUTPATIENT)
Dept: RADIOLOGY | Facility: HOSPITAL | Age: 71
Discharge: HOME | End: 2021-10-12
Attending: THORACIC SURGERY (CARDIOTHORACIC VASCULAR SURGERY)
Payer: COMMERCIAL

## 2021-10-12 VITALS
SYSTOLIC BLOOD PRESSURE: 111 MMHG | BODY MASS INDEX: 25.06 KG/M2 | SYSTOLIC BLOOD PRESSURE: 120 MMHG | HEIGHT: 72 IN | WEIGHT: 185 LBS | BODY MASS INDEX: 25.06 KG/M2 | RESPIRATION RATE: 18 BRPM | DIASTOLIC BLOOD PRESSURE: 77 MMHG | OXYGEN SATURATION: 98 % | WEIGHT: 185.06 LBS | DIASTOLIC BLOOD PRESSURE: 70 MMHG | HEART RATE: 71 BPM | HEART RATE: 80 BPM | HEIGHT: 72 IN

## 2021-10-12 DIAGNOSIS — I35.8 OTHER NONRHEUMATIC AORTIC VALVE DISORDERS: ICD-10-CM

## 2021-10-12 DIAGNOSIS — I77.89 AORTIC ROOT ENLARGEMENT: ICD-10-CM

## 2021-10-12 DIAGNOSIS — Q23.1 BICUSPID AORTIC VALVE: Primary | ICD-10-CM

## 2021-10-12 LAB
ASCENDING AORTA: 4.55 CM
AV INDEX (PROSTH): 0.25
AV MEAN GRADIENT: 12 MMHG
AV PEAK GRADIENT: 18 MMHG
AV VALVE AREA: 1.07 CM2
AV VELOCITY RATIO: 0.25
BSA FOR ECHO PROCEDURE: 2.06 M2
CV ECHO LV RWT: 0.35 CM
DOP CALC AO PEAK VEL: 2.14 M/S
DOP CALC AO VTI: 48.82 CM
DOP CALC LVOT AREA: 4.3 CM2
DOP CALC LVOT DIAMETER: 2.35 CM
DOP CALC LVOT PEAK VEL: 0.54 M/S
DOP CALC LVOT STROKE VOLUME: 52.02 CM3
DOP CALCLVOT PEAK VEL VTI: 12 CM
E/E' RATIO: 5.75 M/S
ECHO LV POSTERIOR WALL: 0.86 CM (ref 0.6–1.1)
EJECTION FRACTION: 55 %
FRACTIONAL SHORTENING: 25 % (ref 28–44)
INTERVENTRICULAR SEPTUM: 0.82 CM (ref 0.6–1.1)
IVRT: 71.36 MSEC
LA MAJOR: 7.04 CM
LA MINOR: 7.15 CM
LA WIDTH: 5 CM
LEFT ATRIUM SIZE: 4.5 CM
LEFT ATRIUM VOLUME INDEX MOD: 73.1 ML/M2
LEFT ATRIUM VOLUME INDEX: 65.9 ML/M2
LEFT ATRIUM VOLUME MOD: 150.62 CM3
LEFT ATRIUM VOLUME: 135.68 CM3
LEFT INTERNAL DIMENSION IN SYSTOLE: 3.65 CM (ref 2.1–4)
LEFT VENTRICLE DIASTOLIC VOLUME INDEX: 54.34 ML/M2
LEFT VENTRICLE DIASTOLIC VOLUME: 111.95 ML
LEFT VENTRICLE MASS INDEX: 67 G/M2
LEFT VENTRICLE SYSTOLIC VOLUME INDEX: 27.2 ML/M2
LEFT VENTRICLE SYSTOLIC VOLUME: 56.13 ML
LEFT VENTRICULAR INTERNAL DIMENSION IN DIASTOLE: 4.88 CM (ref 3.5–6)
LEFT VENTRICULAR MASS: 138.78 G
LV LATERAL E/E' RATIO: 5.75 M/S
LV SEPTAL E/E' RATIO: 5.75 M/S
MV PEAK E VEL: 0.69 M/S
PISA TR MAX VEL: 2.07 M/S
RA MAJOR: 6.43 CM
RA PRESSURE: 8 MMHG
RA WIDTH: 3.96 CM
RIGHT VENTRICULAR END-DIASTOLIC DIMENSION: 4.1 CM
RV TISSUE DOPPLER FREE WALL SYSTOLIC VELOCITY 1 (APICAL 4 CHAMBER VIEW): 10.15 CM/S
SINUS: 4.31 CM
STJ: 4.05 CM
TDI LATERAL: 0.12 M/S
TDI SEPTAL: 0.12 M/S
TDI: 0.12 M/S
TR MAX PG: 17 MMHG
TRICUSPID ANNULAR PLANE SYSTOLIC EXCURSION: 2.36 CM
TV REST PULMONARY ARTERY PRESSURE: 25 MMHG

## 2021-10-12 PROCEDURE — 71250 CT CHEST WITHOUT CONTRAST: ICD-10-PCS | Mod: 26,,, | Performed by: INTERNAL MEDICINE

## 2021-10-12 PROCEDURE — 3078F DIAST BP <80 MM HG: CPT | Mod: CPTII,S$GLB,, | Performed by: THORACIC SURGERY (CARDIOTHORACIC VASCULAR SURGERY)

## 2021-10-12 PROCEDURE — 1126F AMNT PAIN NOTED NONE PRSNT: CPT | Mod: CPTII,S$GLB,, | Performed by: THORACIC SURGERY (CARDIOTHORACIC VASCULAR SURGERY)

## 2021-10-12 PROCEDURE — 71250 CT THORAX DX C-: CPT | Mod: 26,,, | Performed by: INTERNAL MEDICINE

## 2021-10-12 PROCEDURE — 93306 TTE W/DOPPLER COMPLETE: CPT

## 2021-10-12 PROCEDURE — 99215 OFFICE O/P EST HI 40 MIN: CPT | Mod: S$GLB,,, | Performed by: THORACIC SURGERY (CARDIOTHORACIC VASCULAR SURGERY)

## 2021-10-12 PROCEDURE — 3074F SYST BP LT 130 MM HG: CPT | Mod: CPTII,S$GLB,, | Performed by: THORACIC SURGERY (CARDIOTHORACIC VASCULAR SURGERY)

## 2021-10-12 PROCEDURE — 93306 ECHO (CUPID ONLY): ICD-10-PCS | Mod: 26,,, | Performed by: INTERNAL MEDICINE

## 2021-10-12 PROCEDURE — 99215 PR OFFICE/OUTPT VISIT, EST, LEVL V, 40-54 MIN: ICD-10-PCS | Mod: S$GLB,,, | Performed by: THORACIC SURGERY (CARDIOTHORACIC VASCULAR SURGERY)

## 2021-10-12 PROCEDURE — 1126F PR PAIN SEVERITY QUANTIFIED, NO PAIN PRESENT: ICD-10-PCS | Mod: CPTII,S$GLB,, | Performed by: THORACIC SURGERY (CARDIOTHORACIC VASCULAR SURGERY)

## 2021-10-12 PROCEDURE — 1159F PR MEDICATION LIST DOCUMENTED IN MEDICAL RECORD: ICD-10-PCS | Mod: CPTII,S$GLB,, | Performed by: THORACIC SURGERY (CARDIOTHORACIC VASCULAR SURGERY)

## 2021-10-12 PROCEDURE — 3008F PR BODY MASS INDEX (BMI) DOCUMENTED: ICD-10-PCS | Mod: CPTII,S$GLB,, | Performed by: THORACIC SURGERY (CARDIOTHORACIC VASCULAR SURGERY)

## 2021-10-12 PROCEDURE — 1160F PR REVIEW ALL MEDS BY PRESCRIBER/CLIN PHARMACIST DOCUMENTED: ICD-10-PCS | Mod: CPTII,S$GLB,, | Performed by: THORACIC SURGERY (CARDIOTHORACIC VASCULAR SURGERY)

## 2021-10-12 PROCEDURE — 99999 PR PBB SHADOW E&M-EST. PATIENT-LVL III: ICD-10-PCS | Mod: PBBFAC,,, | Performed by: THORACIC SURGERY (CARDIOTHORACIC VASCULAR SURGERY)

## 2021-10-12 PROCEDURE — 3074F PR MOST RECENT SYSTOLIC BLOOD PRESSURE < 130 MM HG: ICD-10-PCS | Mod: CPTII,S$GLB,, | Performed by: THORACIC SURGERY (CARDIOTHORACIC VASCULAR SURGERY)

## 2021-10-12 PROCEDURE — 99999 PR PBB SHADOW E&M-EST. PATIENT-LVL III: CPT | Mod: PBBFAC,,, | Performed by: THORACIC SURGERY (CARDIOTHORACIC VASCULAR SURGERY)

## 2021-10-12 PROCEDURE — 93306 TTE W/DOPPLER COMPLETE: CPT | Mod: 26,,, | Performed by: INTERNAL MEDICINE

## 2021-10-12 PROCEDURE — 71250 CT THORAX DX C-: CPT | Mod: TC

## 2021-10-12 PROCEDURE — 3078F PR MOST RECENT DIASTOLIC BLOOD PRESSURE < 80 MM HG: ICD-10-PCS | Mod: CPTII,S$GLB,, | Performed by: THORACIC SURGERY (CARDIOTHORACIC VASCULAR SURGERY)

## 2021-10-12 PROCEDURE — 1159F MED LIST DOCD IN RCRD: CPT | Mod: CPTII,S$GLB,, | Performed by: THORACIC SURGERY (CARDIOTHORACIC VASCULAR SURGERY)

## 2021-10-12 PROCEDURE — 3008F BODY MASS INDEX DOCD: CPT | Mod: CPTII,S$GLB,, | Performed by: THORACIC SURGERY (CARDIOTHORACIC VASCULAR SURGERY)

## 2021-10-12 PROCEDURE — 1160F RVW MEDS BY RX/DR IN RCRD: CPT | Mod: CPTII,S$GLB,, | Performed by: THORACIC SURGERY (CARDIOTHORACIC VASCULAR SURGERY)

## 2021-10-12 RX ORDER — CELECOXIB 200 MG/1
CAPSULE ORAL
COMMUNITY
Start: 2021-10-04 | End: 2022-02-08

## 2021-10-12 RX ORDER — DORZOLAMIDE HYDROCHLORIDE AND TIMOLOL MALEATE 20; 5 MG/ML; MG/ML
1 SOLUTION/ DROPS OPHTHALMIC 2 TIMES DAILY
COMMUNITY
Start: 2021-10-04

## 2021-10-13 ENCOUNTER — LAB VISIT (OUTPATIENT)
Dept: LAB | Facility: HOSPITAL | Age: 71
End: 2021-10-13
Attending: INTERNAL MEDICINE
Payer: COMMERCIAL

## 2021-10-13 ENCOUNTER — OFFICE VISIT (OUTPATIENT)
Dept: INTERNAL MEDICINE | Facility: CLINIC | Age: 71
End: 2021-10-13
Payer: COMMERCIAL

## 2021-10-13 ENCOUNTER — RESEARCH ENCOUNTER (OUTPATIENT)
Dept: RESEARCH | Facility: HOSPITAL | Age: 71
End: 2021-10-13

## 2021-10-13 VITALS
DIASTOLIC BLOOD PRESSURE: 82 MMHG | HEIGHT: 73 IN | SYSTOLIC BLOOD PRESSURE: 122 MMHG | WEIGHT: 187.38 LBS | HEART RATE: 70 BPM | BODY MASS INDEX: 24.83 KG/M2 | OXYGEN SATURATION: 97 %

## 2021-10-13 DIAGNOSIS — I77.810 AORTIC ROOT DILATION: ICD-10-CM

## 2021-10-13 DIAGNOSIS — M19.011 PRIMARY OSTEOARTHRITIS OF RIGHT SHOULDER: ICD-10-CM

## 2021-10-13 DIAGNOSIS — Z12.5 SCREENING FOR PROSTATE CANCER: ICD-10-CM

## 2021-10-13 DIAGNOSIS — N52.9 ERECTILE DYSFUNCTION, UNSPECIFIED ERECTILE DYSFUNCTION TYPE: ICD-10-CM

## 2021-10-13 DIAGNOSIS — Q23.1 BICUSPID AORTIC VALVE: ICD-10-CM

## 2021-10-13 DIAGNOSIS — D64.9 ANEMIA, UNSPECIFIED TYPE: ICD-10-CM

## 2021-10-13 DIAGNOSIS — R60.9 EDEMA, UNSPECIFIED TYPE: ICD-10-CM

## 2021-10-13 DIAGNOSIS — G47.00 INSOMNIA, UNSPECIFIED TYPE: ICD-10-CM

## 2021-10-13 DIAGNOSIS — I48.91 ATRIAL FIBRILLATION, UNSPECIFIED TYPE: ICD-10-CM

## 2021-10-13 DIAGNOSIS — Z00.00 ROUTINE PHYSICAL EXAMINATION: ICD-10-CM

## 2021-10-13 DIAGNOSIS — Z00.00 ROUTINE PHYSICAL EXAMINATION: Primary | ICD-10-CM

## 2021-10-13 LAB
ALBUMIN SERPL BCP-MCNC: 4.5 G/DL (ref 3.5–5.2)
ALP SERPL-CCNC: 67 U/L (ref 55–135)
ALT SERPL W/O P-5'-P-CCNC: 23 U/L (ref 10–44)
ANION GAP SERPL CALC-SCNC: 9 MMOL/L (ref 8–16)
AST SERPL-CCNC: 33 U/L (ref 10–40)
BASOPHILS # BLD AUTO: 0.03 K/UL (ref 0–0.2)
BASOPHILS NFR BLD: 0.7 % (ref 0–1.9)
BILIRUB SERPL-MCNC: 1.4 MG/DL (ref 0.1–1)
BUN SERPL-MCNC: 28 MG/DL (ref 8–23)
CALCIUM SERPL-MCNC: 10.1 MG/DL (ref 8.7–10.5)
CHLORIDE SERPL-SCNC: 107 MMOL/L (ref 95–110)
CHOLEST SERPL-MCNC: 191 MG/DL (ref 120–199)
CHOLEST/HDLC SERPL: 3.7 {RATIO} (ref 2–5)
CO2 SERPL-SCNC: 25 MMOL/L (ref 23–29)
COMPLEXED PSA SERPL-MCNC: 2.6 NG/ML (ref 0–4)
CREAT SERPL-MCNC: 1 MG/DL (ref 0.5–1.4)
DIFFERENTIAL METHOD: ABNORMAL
EOSINOPHIL # BLD AUTO: 0.1 K/UL (ref 0–0.5)
EOSINOPHIL NFR BLD: 2.6 % (ref 0–8)
ERYTHROCYTE [DISTWIDTH] IN BLOOD BY AUTOMATED COUNT: 11.9 % (ref 11.5–14.5)
EST. GFR  (AFRICAN AMERICAN): >60 ML/MIN/1.73 M^2
EST. GFR  (NON AFRICAN AMERICAN): >60 ML/MIN/1.73 M^2
ESTIMATED AVG GLUCOSE: 88 MG/DL (ref 68–131)
GLUCOSE SERPL-MCNC: 90 MG/DL (ref 70–110)
HBA1C MFR BLD: 4.7 % (ref 4–5.6)
HCT VFR BLD AUTO: 41.8 % (ref 40–54)
HDLC SERPL-MCNC: 52 MG/DL (ref 40–75)
HDLC SERPL: 27.2 % (ref 20–50)
HGB BLD-MCNC: 13.8 G/DL (ref 14–18)
IMM GRANULOCYTES # BLD AUTO: 0.02 K/UL (ref 0–0.04)
IMM GRANULOCYTES NFR BLD AUTO: 0.5 % (ref 0–0.5)
INR PPP: 3.5 (ref 0.8–1.2)
LDLC SERPL CALC-MCNC: 120.8 MG/DL (ref 63–159)
LYMPHOCYTES # BLD AUTO: 1.3 K/UL (ref 1–4.8)
LYMPHOCYTES NFR BLD: 30.3 % (ref 18–48)
MCH RBC QN AUTO: 33.2 PG (ref 27–31)
MCHC RBC AUTO-ENTMCNC: 33 G/DL (ref 32–36)
MCV RBC AUTO: 101 FL (ref 82–98)
MONOCYTES # BLD AUTO: 0.5 K/UL (ref 0.3–1)
MONOCYTES NFR BLD: 11.3 % (ref 4–15)
NEUTROPHILS # BLD AUTO: 2.3 K/UL (ref 1.8–7.7)
NEUTROPHILS NFR BLD: 54.6 % (ref 38–73)
NONHDLC SERPL-MCNC: 139 MG/DL
NRBC BLD-RTO: 0 /100 WBC
PLATELET # BLD AUTO: 219 K/UL (ref 150–450)
PMV BLD AUTO: 11.9 FL (ref 9.2–12.9)
POTASSIUM SERPL-SCNC: 4.1 MMOL/L (ref 3.5–5.1)
PROT SERPL-MCNC: 7.3 G/DL (ref 6–8.4)
PROTHROMBIN TIME: 35.3 SEC (ref 9–12.5)
RBC # BLD AUTO: 4.16 M/UL (ref 4.6–6.2)
SODIUM SERPL-SCNC: 141 MMOL/L (ref 136–145)
TRIGL SERPL-MCNC: 91 MG/DL (ref 30–150)
TSH SERPL DL<=0.005 MIU/L-ACNC: 0.93 UIU/ML (ref 0.4–4)
WBC # BLD AUTO: 4.23 K/UL (ref 3.9–12.7)

## 2021-10-13 PROCEDURE — 1160F RVW MEDS BY RX/DR IN RCRD: CPT | Mod: CPTII,S$GLB,, | Performed by: INTERNAL MEDICINE

## 2021-10-13 PROCEDURE — 99999 PR PBB SHADOW E&M-EST. PATIENT-LVL III: ICD-10-PCS | Mod: PBBFAC,,, | Performed by: INTERNAL MEDICINE

## 2021-10-13 PROCEDURE — 3079F DIAST BP 80-89 MM HG: CPT | Mod: CPTII,S$GLB,, | Performed by: INTERNAL MEDICINE

## 2021-10-13 PROCEDURE — 1159F MED LIST DOCD IN RCRD: CPT | Mod: CPTII,S$GLB,, | Performed by: INTERNAL MEDICINE

## 2021-10-13 PROCEDURE — 36415 COLL VENOUS BLD VENIPUNCTURE: CPT | Performed by: INTERNAL MEDICINE

## 2021-10-13 PROCEDURE — 99397 PER PM REEVAL EST PAT 65+ YR: CPT | Mod: S$GLB,,, | Performed by: INTERNAL MEDICINE

## 2021-10-13 PROCEDURE — 1159F PR MEDICATION LIST DOCUMENTED IN MEDICAL RECORD: ICD-10-PCS | Mod: CPTII,S$GLB,, | Performed by: INTERNAL MEDICINE

## 2021-10-13 PROCEDURE — 99999 PR PBB SHADOW E&M-EST. PATIENT-LVL III: CPT | Mod: PBBFAC,,, | Performed by: INTERNAL MEDICINE

## 2021-10-13 PROCEDURE — 85610 PROTHROMBIN TIME: CPT | Performed by: INTERNAL MEDICINE

## 2021-10-13 PROCEDURE — 3288F PR FALLS RISK ASSESSMENT DOCUMENTED: ICD-10-PCS | Mod: CPTII,S$GLB,, | Performed by: INTERNAL MEDICINE

## 2021-10-13 PROCEDURE — 1101F PR PT FALLS ASSESS DOC 0-1 FALLS W/OUT INJ PAST YR: ICD-10-PCS | Mod: CPTII,S$GLB,, | Performed by: INTERNAL MEDICINE

## 2021-10-13 PROCEDURE — 3008F PR BODY MASS INDEX (BMI) DOCUMENTED: ICD-10-PCS | Mod: CPTII,S$GLB,, | Performed by: INTERNAL MEDICINE

## 2021-10-13 PROCEDURE — 3288F FALL RISK ASSESSMENT DOCD: CPT | Mod: CPTII,S$GLB,, | Performed by: INTERNAL MEDICINE

## 2021-10-13 PROCEDURE — 3079F PR MOST RECENT DIASTOLIC BLOOD PRESSURE 80-89 MM HG: ICD-10-PCS | Mod: CPTII,S$GLB,, | Performed by: INTERNAL MEDICINE

## 2021-10-13 PROCEDURE — 99397 PR PREVENTIVE VISIT,EST,65 & OVER: ICD-10-PCS | Mod: S$GLB,,, | Performed by: INTERNAL MEDICINE

## 2021-10-13 PROCEDURE — 80053 COMPREHEN METABOLIC PANEL: CPT | Performed by: INTERNAL MEDICINE

## 2021-10-13 PROCEDURE — 85025 COMPLETE CBC W/AUTO DIFF WBC: CPT | Performed by: INTERNAL MEDICINE

## 2021-10-13 PROCEDURE — 3008F BODY MASS INDEX DOCD: CPT | Mod: CPTII,S$GLB,, | Performed by: INTERNAL MEDICINE

## 2021-10-13 PROCEDURE — 3074F SYST BP LT 130 MM HG: CPT | Mod: CPTII,S$GLB,, | Performed by: INTERNAL MEDICINE

## 2021-10-13 PROCEDURE — 80061 LIPID PANEL: CPT | Performed by: INTERNAL MEDICINE

## 2021-10-13 PROCEDURE — 1101F PT FALLS ASSESS-DOCD LE1/YR: CPT | Mod: CPTII,S$GLB,, | Performed by: INTERNAL MEDICINE

## 2021-10-13 PROCEDURE — 1126F AMNT PAIN NOTED NONE PRSNT: CPT | Mod: CPTII,S$GLB,, | Performed by: INTERNAL MEDICINE

## 2021-10-13 PROCEDURE — 84153 ASSAY OF PSA TOTAL: CPT | Performed by: INTERNAL MEDICINE

## 2021-10-13 PROCEDURE — 3074F PR MOST RECENT SYSTOLIC BLOOD PRESSURE < 130 MM HG: ICD-10-PCS | Mod: CPTII,S$GLB,, | Performed by: INTERNAL MEDICINE

## 2021-10-13 PROCEDURE — 1160F PR REVIEW ALL MEDS BY PRESCRIBER/CLIN PHARMACIST DOCUMENTED: ICD-10-PCS | Mod: CPTII,S$GLB,, | Performed by: INTERNAL MEDICINE

## 2021-10-13 PROCEDURE — 83036 HEMOGLOBIN GLYCOSYLATED A1C: CPT | Performed by: INTERNAL MEDICINE

## 2021-10-13 PROCEDURE — 1126F PR PAIN SEVERITY QUANTIFIED, NO PAIN PRESENT: ICD-10-PCS | Mod: CPTII,S$GLB,, | Performed by: INTERNAL MEDICINE

## 2021-10-13 PROCEDURE — 84443 ASSAY THYROID STIM HORMONE: CPT | Performed by: INTERNAL MEDICINE

## 2021-10-13 RX ORDER — ZOLPIDEM TARTRATE 10 MG/1
10 TABLET ORAL NIGHTLY PRN
Qty: 30 TABLET | Refills: 2 | Status: SHIPPED | OUTPATIENT
Start: 2021-10-13 | End: 2023-01-26 | Stop reason: SDUPTHER

## 2021-10-15 ENCOUNTER — PATIENT MESSAGE (OUTPATIENT)
Dept: CARDIOTHORACIC SURGERY | Facility: CLINIC | Age: 71
End: 2021-10-15
Payer: COMMERCIAL

## 2021-10-15 ENCOUNTER — PATIENT MESSAGE (OUTPATIENT)
Dept: INTERNAL MEDICINE | Facility: CLINIC | Age: 71
End: 2021-10-15
Payer: COMMERCIAL

## 2021-10-20 ENCOUNTER — OFFICE VISIT (OUTPATIENT)
Dept: ELECTROPHYSIOLOGY | Facility: CLINIC | Age: 71
End: 2021-10-20
Payer: COMMERCIAL

## 2021-10-20 VITALS
HEART RATE: 68 BPM | BODY MASS INDEX: 26.01 KG/M2 | SYSTOLIC BLOOD PRESSURE: 114 MMHG | HEIGHT: 72 IN | WEIGHT: 192 LBS | DIASTOLIC BLOOD PRESSURE: 78 MMHG

## 2021-10-20 DIAGNOSIS — I48.21 PERMANENT ATRIAL FIBRILLATION: Primary | ICD-10-CM

## 2021-10-20 DIAGNOSIS — Q23.1 BICUSPID AORTIC VALVE: ICD-10-CM

## 2021-10-20 DIAGNOSIS — I77.89 AORTIC ROOT ENLARGEMENT: ICD-10-CM

## 2021-10-20 PROCEDURE — 99214 PR OFFICE/OUTPT VISIT, EST, LEVL IV, 30-39 MIN: ICD-10-PCS | Mod: S$GLB,,, | Performed by: INTERNAL MEDICINE

## 2021-10-20 PROCEDURE — 3074F SYST BP LT 130 MM HG: CPT | Mod: CPTII,S$GLB,, | Performed by: INTERNAL MEDICINE

## 2021-10-20 PROCEDURE — 99999 PR PBB SHADOW E&M-EST. PATIENT-LVL III: ICD-10-PCS | Mod: PBBFAC,,, | Performed by: INTERNAL MEDICINE

## 2021-10-20 PROCEDURE — 3008F PR BODY MASS INDEX (BMI) DOCUMENTED: ICD-10-PCS | Mod: CPTII,S$GLB,, | Performed by: INTERNAL MEDICINE

## 2021-10-20 PROCEDURE — 1160F PR REVIEW ALL MEDS BY PRESCRIBER/CLIN PHARMACIST DOCUMENTED: ICD-10-PCS | Mod: CPTII,S$GLB,, | Performed by: INTERNAL MEDICINE

## 2021-10-20 PROCEDURE — 3074F PR MOST RECENT SYSTOLIC BLOOD PRESSURE < 130 MM HG: ICD-10-PCS | Mod: CPTII,S$GLB,, | Performed by: INTERNAL MEDICINE

## 2021-10-20 PROCEDURE — 1101F PR PT FALLS ASSESS DOC 0-1 FALLS W/OUT INJ PAST YR: ICD-10-PCS | Mod: CPTII,S$GLB,, | Performed by: INTERNAL MEDICINE

## 2021-10-20 PROCEDURE — 1160F RVW MEDS BY RX/DR IN RCRD: CPT | Mod: CPTII,S$GLB,, | Performed by: INTERNAL MEDICINE

## 2021-10-20 PROCEDURE — 1126F PR PAIN SEVERITY QUANTIFIED, NO PAIN PRESENT: ICD-10-PCS | Mod: CPTII,S$GLB,, | Performed by: INTERNAL MEDICINE

## 2021-10-20 PROCEDURE — 1101F PT FALLS ASSESS-DOCD LE1/YR: CPT | Mod: CPTII,S$GLB,, | Performed by: INTERNAL MEDICINE

## 2021-10-20 PROCEDURE — 99214 OFFICE O/P EST MOD 30 MIN: CPT | Mod: S$GLB,,, | Performed by: INTERNAL MEDICINE

## 2021-10-20 PROCEDURE — 1159F MED LIST DOCD IN RCRD: CPT | Mod: CPTII,S$GLB,, | Performed by: INTERNAL MEDICINE

## 2021-10-20 PROCEDURE — 3078F DIAST BP <80 MM HG: CPT | Mod: CPTII,S$GLB,, | Performed by: INTERNAL MEDICINE

## 2021-10-20 PROCEDURE — 3008F BODY MASS INDEX DOCD: CPT | Mod: CPTII,S$GLB,, | Performed by: INTERNAL MEDICINE

## 2021-10-20 PROCEDURE — 3044F HG A1C LEVEL LT 7.0%: CPT | Mod: CPTII,S$GLB,, | Performed by: INTERNAL MEDICINE

## 2021-10-20 PROCEDURE — 3288F FALL RISK ASSESSMENT DOCD: CPT | Mod: CPTII,S$GLB,, | Performed by: INTERNAL MEDICINE

## 2021-10-20 PROCEDURE — 1126F AMNT PAIN NOTED NONE PRSNT: CPT | Mod: CPTII,S$GLB,, | Performed by: INTERNAL MEDICINE

## 2021-10-20 PROCEDURE — 3044F PR MOST RECENT HEMOGLOBIN A1C LEVEL <7.0%: ICD-10-PCS | Mod: CPTII,S$GLB,, | Performed by: INTERNAL MEDICINE

## 2021-10-20 PROCEDURE — 3288F PR FALLS RISK ASSESSMENT DOCUMENTED: ICD-10-PCS | Mod: CPTII,S$GLB,, | Performed by: INTERNAL MEDICINE

## 2021-10-20 PROCEDURE — 3078F PR MOST RECENT DIASTOLIC BLOOD PRESSURE < 80 MM HG: ICD-10-PCS | Mod: CPTII,S$GLB,, | Performed by: INTERNAL MEDICINE

## 2021-10-20 PROCEDURE — 1159F PR MEDICATION LIST DOCUMENTED IN MEDICAL RECORD: ICD-10-PCS | Mod: CPTII,S$GLB,, | Performed by: INTERNAL MEDICINE

## 2021-10-20 PROCEDURE — 99999 PR PBB SHADOW E&M-EST. PATIENT-LVL III: CPT | Mod: PBBFAC,,, | Performed by: INTERNAL MEDICINE

## 2021-10-25 ENCOUNTER — PATIENT MESSAGE (OUTPATIENT)
Dept: INTERNAL MEDICINE | Facility: CLINIC | Age: 71
End: 2021-10-25
Payer: COMMERCIAL

## 2021-11-05 ENCOUNTER — PATIENT MESSAGE (OUTPATIENT)
Dept: INTERNAL MEDICINE | Facility: CLINIC | Age: 71
End: 2021-11-05
Payer: COMMERCIAL

## 2021-11-16 ENCOUNTER — OFFICE VISIT (OUTPATIENT)
Dept: INTERNAL MEDICINE | Facility: CLINIC | Age: 71
End: 2021-11-16
Payer: COMMERCIAL

## 2021-11-16 ENCOUNTER — OFFICE VISIT (OUTPATIENT)
Dept: SURGERY | Facility: CLINIC | Age: 71
End: 2021-11-16
Payer: COMMERCIAL

## 2021-11-16 VITALS
SYSTOLIC BLOOD PRESSURE: 135 MMHG | HEART RATE: 75 BPM | HEIGHT: 72 IN | DIASTOLIC BLOOD PRESSURE: 71 MMHG | BODY MASS INDEX: 25.77 KG/M2 | WEIGHT: 190.25 LBS

## 2021-11-16 VITALS
DIASTOLIC BLOOD PRESSURE: 68 MMHG | SYSTOLIC BLOOD PRESSURE: 104 MMHG | HEART RATE: 76 BPM | HEIGHT: 72 IN | WEIGHT: 190.25 LBS | OXYGEN SATURATION: 98 % | BODY MASS INDEX: 25.77 KG/M2

## 2021-11-16 DIAGNOSIS — K40.20 NON-RECURRENT BILATERAL INGUINAL HERNIA WITHOUT OBSTRUCTION OR GANGRENE: ICD-10-CM

## 2021-11-16 DIAGNOSIS — Q23.1 BICUSPID AORTIC VALVE: ICD-10-CM

## 2021-11-16 DIAGNOSIS — I48.21 PERMANENT ATRIAL FIBRILLATION: ICD-10-CM

## 2021-11-16 DIAGNOSIS — R19.09 BULGE IN GROIN AREA: Primary | ICD-10-CM

## 2021-11-16 DIAGNOSIS — K40.90 LEFT INGUINAL HERNIA: ICD-10-CM

## 2021-11-16 PROCEDURE — 3288F FALL RISK ASSESSMENT DOCD: CPT | Mod: CPTII,S$GLB,, | Performed by: INTERNAL MEDICINE

## 2021-11-16 PROCEDURE — 99999 PR PBB SHADOW E&M-EST. PATIENT-LVL III: CPT | Mod: PBBFAC,,, | Performed by: SURGERY

## 2021-11-16 PROCEDURE — 99999 PR PBB SHADOW E&M-EST. PATIENT-LVL IV: CPT | Mod: PBBFAC,,, | Performed by: INTERNAL MEDICINE

## 2021-11-16 PROCEDURE — 1101F PT FALLS ASSESS-DOCD LE1/YR: CPT | Mod: CPTII,S$GLB,, | Performed by: SURGERY

## 2021-11-16 PROCEDURE — 3074F SYST BP LT 130 MM HG: CPT | Mod: CPTII,S$GLB,, | Performed by: INTERNAL MEDICINE

## 2021-11-16 PROCEDURE — 3288F PR FALLS RISK ASSESSMENT DOCUMENTED: ICD-10-PCS | Mod: CPTII,S$GLB,, | Performed by: SURGERY

## 2021-11-16 PROCEDURE — 1126F PR PAIN SEVERITY QUANTIFIED, NO PAIN PRESENT: ICD-10-PCS | Mod: CPTII,S$GLB,, | Performed by: INTERNAL MEDICINE

## 2021-11-16 PROCEDURE — 1126F PR PAIN SEVERITY QUANTIFIED, NO PAIN PRESENT: ICD-10-PCS | Mod: CPTII,S$GLB,, | Performed by: SURGERY

## 2021-11-16 PROCEDURE — 1159F PR MEDICATION LIST DOCUMENTED IN MEDICAL RECORD: ICD-10-PCS | Mod: CPTII,S$GLB,, | Performed by: SURGERY

## 2021-11-16 PROCEDURE — 1160F PR REVIEW ALL MEDS BY PRESCRIBER/CLIN PHARMACIST DOCUMENTED: ICD-10-PCS | Mod: CPTII,S$GLB,, | Performed by: INTERNAL MEDICINE

## 2021-11-16 PROCEDURE — 3008F BODY MASS INDEX DOCD: CPT | Mod: CPTII,S$GLB,, | Performed by: INTERNAL MEDICINE

## 2021-11-16 PROCEDURE — 3044F HG A1C LEVEL LT 7.0%: CPT | Mod: CPTII,S$GLB,, | Performed by: SURGERY

## 2021-11-16 PROCEDURE — 1101F PT FALLS ASSESS-DOCD LE1/YR: CPT | Mod: CPTII,S$GLB,, | Performed by: INTERNAL MEDICINE

## 2021-11-16 PROCEDURE — 1101F PR PT FALLS ASSESS DOC 0-1 FALLS W/OUT INJ PAST YR: ICD-10-PCS | Mod: CPTII,S$GLB,, | Performed by: SURGERY

## 2021-11-16 PROCEDURE — 3078F PR MOST RECENT DIASTOLIC BLOOD PRESSURE < 80 MM HG: ICD-10-PCS | Mod: CPTII,S$GLB,, | Performed by: SURGERY

## 2021-11-16 PROCEDURE — 3008F PR BODY MASS INDEX (BMI) DOCUMENTED: ICD-10-PCS | Mod: CPTII,S$GLB,, | Performed by: SURGERY

## 2021-11-16 PROCEDURE — 99214 PR OFFICE/OUTPT VISIT, EST, LEVL IV, 30-39 MIN: ICD-10-PCS | Mod: S$GLB,,, | Performed by: INTERNAL MEDICINE

## 2021-11-16 PROCEDURE — 99203 OFFICE O/P NEW LOW 30 MIN: CPT | Mod: S$GLB,,, | Performed by: SURGERY

## 2021-11-16 PROCEDURE — 99203 PR OFFICE/OUTPT VISIT, NEW, LEVL III, 30-44 MIN: ICD-10-PCS | Mod: S$GLB,,, | Performed by: SURGERY

## 2021-11-16 PROCEDURE — 1160F RVW MEDS BY RX/DR IN RCRD: CPT | Mod: CPTII,S$GLB,, | Performed by: INTERNAL MEDICINE

## 2021-11-16 PROCEDURE — 3075F SYST BP GE 130 - 139MM HG: CPT | Mod: CPTII,S$GLB,, | Performed by: SURGERY

## 2021-11-16 PROCEDURE — 1101F PR PT FALLS ASSESS DOC 0-1 FALLS W/OUT INJ PAST YR: ICD-10-PCS | Mod: CPTII,S$GLB,, | Performed by: INTERNAL MEDICINE

## 2021-11-16 PROCEDURE — 3288F PR FALLS RISK ASSESSMENT DOCUMENTED: ICD-10-PCS | Mod: CPTII,S$GLB,, | Performed by: INTERNAL MEDICINE

## 2021-11-16 PROCEDURE — 1159F MED LIST DOCD IN RCRD: CPT | Mod: CPTII,S$GLB,, | Performed by: INTERNAL MEDICINE

## 2021-11-16 PROCEDURE — 3008F BODY MASS INDEX DOCD: CPT | Mod: CPTII,S$GLB,, | Performed by: SURGERY

## 2021-11-16 PROCEDURE — 3078F DIAST BP <80 MM HG: CPT | Mod: CPTII,S$GLB,, | Performed by: SURGERY

## 2021-11-16 PROCEDURE — 1159F PR MEDICATION LIST DOCUMENTED IN MEDICAL RECORD: ICD-10-PCS | Mod: CPTII,S$GLB,, | Performed by: INTERNAL MEDICINE

## 2021-11-16 PROCEDURE — 3078F DIAST BP <80 MM HG: CPT | Mod: CPTII,S$GLB,, | Performed by: INTERNAL MEDICINE

## 2021-11-16 PROCEDURE — 99999 PR PBB SHADOW E&M-EST. PATIENT-LVL IV: ICD-10-PCS | Mod: PBBFAC,,, | Performed by: INTERNAL MEDICINE

## 2021-11-16 PROCEDURE — 3008F PR BODY MASS INDEX (BMI) DOCUMENTED: ICD-10-PCS | Mod: CPTII,S$GLB,, | Performed by: INTERNAL MEDICINE

## 2021-11-16 PROCEDURE — 1159F MED LIST DOCD IN RCRD: CPT | Mod: CPTII,S$GLB,, | Performed by: SURGERY

## 2021-11-16 PROCEDURE — 99214 OFFICE O/P EST MOD 30 MIN: CPT | Mod: S$GLB,,, | Performed by: INTERNAL MEDICINE

## 2021-11-16 PROCEDURE — 3074F PR MOST RECENT SYSTOLIC BLOOD PRESSURE < 130 MM HG: ICD-10-PCS | Mod: CPTII,S$GLB,, | Performed by: INTERNAL MEDICINE

## 2021-11-16 PROCEDURE — 3044F HG A1C LEVEL LT 7.0%: CPT | Mod: CPTII,S$GLB,, | Performed by: INTERNAL MEDICINE

## 2021-11-16 PROCEDURE — 3288F FALL RISK ASSESSMENT DOCD: CPT | Mod: CPTII,S$GLB,, | Performed by: SURGERY

## 2021-11-16 PROCEDURE — 3044F PR MOST RECENT HEMOGLOBIN A1C LEVEL <7.0%: ICD-10-PCS | Mod: CPTII,S$GLB,, | Performed by: INTERNAL MEDICINE

## 2021-11-16 PROCEDURE — 3075F PR MOST RECENT SYSTOLIC BLOOD PRESS GE 130-139MM HG: ICD-10-PCS | Mod: CPTII,S$GLB,, | Performed by: SURGERY

## 2021-11-16 PROCEDURE — 1126F AMNT PAIN NOTED NONE PRSNT: CPT | Mod: CPTII,S$GLB,, | Performed by: INTERNAL MEDICINE

## 2021-11-16 PROCEDURE — 1126F AMNT PAIN NOTED NONE PRSNT: CPT | Mod: CPTII,S$GLB,, | Performed by: SURGERY

## 2021-11-16 PROCEDURE — 3078F PR MOST RECENT DIASTOLIC BLOOD PRESSURE < 80 MM HG: ICD-10-PCS | Mod: CPTII,S$GLB,, | Performed by: INTERNAL MEDICINE

## 2021-11-16 PROCEDURE — 3044F PR MOST RECENT HEMOGLOBIN A1C LEVEL <7.0%: ICD-10-PCS | Mod: CPTII,S$GLB,, | Performed by: SURGERY

## 2021-11-16 PROCEDURE — 1160F PR REVIEW ALL MEDS BY PRESCRIBER/CLIN PHARMACIST DOCUMENTED: ICD-10-PCS | Mod: CPTII,S$GLB,, | Performed by: SURGERY

## 2021-11-16 PROCEDURE — 1160F RVW MEDS BY RX/DR IN RCRD: CPT | Mod: CPTII,S$GLB,, | Performed by: SURGERY

## 2021-11-16 PROCEDURE — 99999 PR PBB SHADOW E&M-EST. PATIENT-LVL III: ICD-10-PCS | Mod: PBBFAC,,, | Performed by: SURGERY

## 2021-11-23 ENCOUNTER — TELEPHONE (OUTPATIENT)
Dept: INTERNAL MEDICINE | Facility: CLINIC | Age: 71
End: 2021-11-23
Payer: COMMERCIAL

## 2021-11-23 ENCOUNTER — PATIENT MESSAGE (OUTPATIENT)
Dept: INTERNAL MEDICINE | Facility: CLINIC | Age: 71
End: 2021-11-23
Payer: COMMERCIAL

## 2021-11-23 DIAGNOSIS — K40.20 BILATERAL INGUINAL HERNIA WITHOUT OBSTRUCTION OR GANGRENE, RECURRENCE NOT SPECIFIED: Primary | ICD-10-CM

## 2021-11-23 DIAGNOSIS — R10.9 ABDOMINAL PAIN, UNSPECIFIED ABDOMINAL LOCATION: ICD-10-CM

## 2021-11-24 ENCOUNTER — TELEPHONE (OUTPATIENT)
Dept: INTERNAL MEDICINE | Facility: CLINIC | Age: 71
End: 2021-11-24
Payer: COMMERCIAL

## 2021-11-29 ENCOUNTER — PATIENT MESSAGE (OUTPATIENT)
Dept: INTERNAL MEDICINE | Facility: CLINIC | Age: 71
End: 2021-11-29
Payer: COMMERCIAL

## 2021-12-14 ENCOUNTER — HOSPITAL ENCOUNTER (OUTPATIENT)
Dept: RADIOLOGY | Facility: HOSPITAL | Age: 71
Discharge: HOME | End: 2021-12-14
Attending: INTERNAL MEDICINE
Payer: COMMERCIAL

## 2021-12-14 DIAGNOSIS — K40.20 BILATERAL INGUINAL HERNIA WITHOUT OBSTRUCTION OR GANGRENE, RECURRENCE NOT SPECIFIED: ICD-10-CM

## 2021-12-14 DIAGNOSIS — R10.9 ABDOMINAL PAIN, UNSPECIFIED ABDOMINAL LOCATION: ICD-10-CM

## 2021-12-14 LAB
CREAT SERPL-MCNC: 0.9 MG/DL (ref 0.5–1.4)
SAMPLE: NORMAL

## 2021-12-14 PROCEDURE — 25500020 PHARM REV CODE 255: Performed by: INTERNAL MEDICINE

## 2021-12-14 PROCEDURE — 74177 CT ABD & PELVIS W/CONTRAST: CPT | Mod: TC

## 2021-12-14 PROCEDURE — 74177 CT ABDOMEN PELVIS WITH CONTRAST: ICD-10-PCS | Mod: 26,,, | Performed by: STUDENT IN AN ORGANIZED HEALTH CARE EDUCATION/TRAINING PROGRAM

## 2021-12-14 PROCEDURE — 74177 CT ABD & PELVIS W/CONTRAST: CPT | Mod: 26,,, | Performed by: STUDENT IN AN ORGANIZED HEALTH CARE EDUCATION/TRAINING PROGRAM

## 2021-12-14 RX ADMIN — IOHEXOL 15 ML: 350 INJECTION, SOLUTION INTRAVENOUS at 05:12

## 2021-12-14 RX ADMIN — IOHEXOL 100 ML: 350 INJECTION, SOLUTION INTRAVENOUS at 05:12

## 2021-12-15 ENCOUNTER — PATIENT MESSAGE (OUTPATIENT)
Dept: INTERNAL MEDICINE | Facility: CLINIC | Age: 71
End: 2021-12-15
Payer: COMMERCIAL

## 2022-01-19 ENCOUNTER — PATIENT MESSAGE (OUTPATIENT)
Dept: CARDIOTHORACIC SURGERY | Facility: CLINIC | Age: 72
End: 2022-01-19
Payer: COMMERCIAL

## 2022-02-19 ENCOUNTER — PATIENT MESSAGE (OUTPATIENT)
Dept: INTERNAL MEDICINE | Facility: CLINIC | Age: 72
End: 2022-02-19
Payer: COMMERCIAL

## 2022-04-28 ENCOUNTER — PATIENT MESSAGE (OUTPATIENT)
Dept: INTERNAL MEDICINE | Facility: CLINIC | Age: 72
End: 2022-04-28
Payer: COMMERCIAL

## 2022-08-02 ENCOUNTER — PATIENT MESSAGE (OUTPATIENT)
Dept: INTERNAL MEDICINE | Facility: CLINIC | Age: 72
End: 2022-08-02
Payer: MEDICARE

## 2022-09-19 ENCOUNTER — PATIENT MESSAGE (OUTPATIENT)
Dept: ELECTROPHYSIOLOGY | Facility: CLINIC | Age: 72
End: 2022-09-19
Payer: MEDICARE

## 2022-10-03 ENCOUNTER — PATIENT MESSAGE (OUTPATIENT)
Dept: CARDIOTHORACIC SURGERY | Facility: CLINIC | Age: 72
End: 2022-10-03
Payer: MEDICARE

## 2022-10-03 ENCOUNTER — PATIENT MESSAGE (OUTPATIENT)
Dept: INTERNAL MEDICINE | Facility: CLINIC | Age: 72
End: 2022-10-03
Payer: MEDICARE

## 2022-10-03 DIAGNOSIS — Q23.1 BICUSPID AORTIC VALVE: Primary | ICD-10-CM

## 2022-10-03 DIAGNOSIS — Z12.5 SCREENING FOR PROSTATE CANCER: Primary | ICD-10-CM

## 2022-10-03 DIAGNOSIS — R10.9 ABDOMINAL PAIN, UNSPECIFIED ABDOMINAL LOCATION: ICD-10-CM

## 2022-10-03 DIAGNOSIS — Z00.00 ROUTINE PHYSICAL EXAMINATION: ICD-10-CM

## 2022-10-03 DIAGNOSIS — I77.89 AORTIC ROOT ENLARGEMENT: ICD-10-CM

## 2022-10-03 DIAGNOSIS — R73.09 ELEVATED GLUCOSE LEVEL: ICD-10-CM

## 2022-10-03 DIAGNOSIS — D64.9 ANEMIA, UNSPECIFIED TYPE: ICD-10-CM

## 2022-10-04 ENCOUNTER — PATIENT MESSAGE (OUTPATIENT)
Dept: INTERNAL MEDICINE | Facility: CLINIC | Age: 72
End: 2022-10-04
Payer: MEDICARE

## 2022-10-05 ENCOUNTER — PATIENT MESSAGE (OUTPATIENT)
Dept: INTERNAL MEDICINE | Facility: CLINIC | Age: 72
End: 2022-10-05
Payer: MEDICARE

## 2022-12-27 NOTE — PROGRESS NOTES
Patient has not been enrolled with Coumadin Clinic since 12/2020. He will need to get prescription from Dr. Sosa who he is followed by.

## 2022-12-27 NOTE — PROGRESS NOTES
12/27/22  Patient called and left message on voice mail requesting a warfarin prescription, I returned Patient's call to verify if any other Dr has been monitoring his warfarin therapy since we have not had an INR for Coumadin Clinic since 12/18/20, Patient reports he has moved out of the area (now in [place]) and has not been established with a new Dr., [facility] still sees Dr Sosa for yearly physical, last INR was drawn 10/13/21 and was 3.5, verified warfarin 7mg daily except 5mg -Tuesday and Saturday--states on this dose for several years, next clinic appointment with Dr Sosa is set for 1/25/23 with labs 1/26/23, requesting a prescription for Warfarin -5mg and 2mg tablets called in to [facility] in [place] ph # [phone]--states has 2 weeks left

## 2023-01-03 ENCOUNTER — TELEPHONE (OUTPATIENT)
Dept: ELECTROPHYSIOLOGY | Facility: CLINIC | Age: 73
End: 2023-01-03
Payer: MEDICARE

## 2023-01-03 NOTE — TELEPHONE ENCOUNTER
DANIEL Padilla, RN  Caller: Unspecified (4 days ago,  3:51 PM)  The pt says no one is following his coumadin.           Previous Messages     ----- Message -----   From: Joana Russell RN   Sent: 12/30/2022   4:31 PM CST   To: Vikas Kahn MA     Can you call and find out who is following his coumadin? That's who needs to fill it.   Thanks   ----- Message -----   From: Vikas Kahn MA   Sent: 12/30/2022   4:24 PM CST   To: Joana Russell RN     Good afternoon Deb   See below please advise.   Thanks     ----- Message -----   From: Debbie Kirby PharmD   Sent: 12/30/2022   4:18 PM CST   To: Vikas Kahn MA     Good afternoon!     This patient is not followed in our Coumadin Clinic, therefore we will not be able to provide a prescription. Dr. Sosa will need to send in a prescription if he is okay doing so.     Thank you!     Debbie Kirby PharmD, [facility]   Coumadin Clinic   ----- Message -----   From: Vikas Kahn MA   Sent: 12/30/2022   4:15 PM CST           Spoke with pt, reports no one has been monitoring his coumadin or INR levels, has moved to [place] and has not re-estabished care, erx was sent pt reports he did receive the coumadin, has virtual appt with Dr Rodriguez scheduled for jan 25th, will notify clinic manager

## 2023-01-11 ENCOUNTER — PATIENT MESSAGE (OUTPATIENT)
Dept: ELECTROPHYSIOLOGY | Facility: CLINIC | Age: 73
End: 2023-01-11
Payer: MEDICARE

## 2023-01-22 ENCOUNTER — PATIENT MESSAGE (OUTPATIENT)
Dept: ELECTROPHYSIOLOGY | Facility: CLINIC | Age: 73
End: 2023-01-22
Payer: MEDICARE

## 2023-01-26 ENCOUNTER — HOSPITAL ENCOUNTER (OUTPATIENT)
Dept: CARDIOLOGY | Facility: HOSPITAL | Age: 73
Discharge: HOME | End: 2023-01-26
Attending: THORACIC SURGERY (CARDIOTHORACIC VASCULAR SURGERY)
Payer: MEDICARE

## 2023-01-26 ENCOUNTER — HOSPITAL ENCOUNTER (OUTPATIENT)
Dept: RADIOLOGY | Facility: HOSPITAL | Age: 73
Discharge: HOME | End: 2023-01-26
Attending: THORACIC SURGERY (CARDIOTHORACIC VASCULAR SURGERY)
Payer: MEDICARE

## 2023-01-26 ENCOUNTER — OFFICE VISIT (OUTPATIENT)
Dept: INTERNAL MEDICINE | Facility: CLINIC | Age: 73
End: 2023-01-26
Payer: MEDICARE

## 2023-01-26 ENCOUNTER — OFFICE VISIT (OUTPATIENT)
Dept: CARDIOTHORACIC SURGERY | Facility: CLINIC | Age: 73
End: 2023-01-26
Payer: MEDICARE

## 2023-01-26 VITALS
SYSTOLIC BLOOD PRESSURE: 122 MMHG | WEIGHT: 190 LBS | HEART RATE: 70 BPM | HEART RATE: 82 BPM | WEIGHT: 190.06 LBS | DIASTOLIC BLOOD PRESSURE: 79 MMHG | BODY MASS INDEX: 25.73 KG/M2 | OXYGEN SATURATION: 99 % | SYSTOLIC BLOOD PRESSURE: 112 MMHG | RESPIRATION RATE: 18 BRPM | HEIGHT: 72 IN | HEIGHT: 72 IN | DIASTOLIC BLOOD PRESSURE: 68 MMHG | BODY MASS INDEX: 25.74 KG/M2

## 2023-01-26 VITALS
OXYGEN SATURATION: 98 % | SYSTOLIC BLOOD PRESSURE: 122 MMHG | HEIGHT: 72 IN | BODY MASS INDEX: 25.59 KG/M2 | DIASTOLIC BLOOD PRESSURE: 68 MMHG | WEIGHT: 188.94 LBS | HEART RATE: 90 BPM

## 2023-01-26 DIAGNOSIS — I77.89 AORTIC ROOT ENLARGEMENT: ICD-10-CM

## 2023-01-26 DIAGNOSIS — Q23.1 BICUSPID AORTIC VALVE: ICD-10-CM

## 2023-01-26 DIAGNOSIS — I48.21 PERMANENT ATRIAL FIBRILLATION: ICD-10-CM

## 2023-01-26 DIAGNOSIS — I77.89 AORTIC ROOT ENLARGEMENT: Primary | ICD-10-CM

## 2023-01-26 DIAGNOSIS — Q23.1 BICUSPID AORTIC VALVE: Primary | ICD-10-CM

## 2023-01-26 DIAGNOSIS — Z86.16 HISTORY OF COVID-19: ICD-10-CM

## 2023-01-26 DIAGNOSIS — M19.011 PRIMARY OSTEOARTHRITIS OF RIGHT SHOULDER: ICD-10-CM

## 2023-01-26 DIAGNOSIS — E78.5 HYPERLIPIDEMIA, UNSPECIFIED HYPERLIPIDEMIA TYPE: ICD-10-CM

## 2023-01-26 LAB
ASCENDING AORTA: 4.5 CM
AV INDEX (PROSTH): 0.33
AV MEAN GRADIENT: 13 MMHG
AV PEAK GRADIENT: 26 MMHG
AV VALVE AREA: 1.45 CM2
AV VELOCITY RATIO: 0.24
BSA FOR ECHO PROCEDURE: 2.09 M2
CV ECHO LV RWT: 0.34 CM
DOP CALC AO PEAK VEL: 2.56 M/S
DOP CALC AO VTI: 48 CM
DOP CALC LVOT AREA: 4.3 CM2
DOP CALC LVOT DIAMETER: 2.35 CM
DOP CALC LVOT PEAK VEL: 0.62 M/S
DOP CALC LVOT STROKE VOLUME: 69.36 CM3
DOP CALCLVOT PEAK VEL VTI: 16 CM
E/E' RATIO: 7.09 M/S
ECHO LV POSTERIOR WALL: 0.78 CM (ref 0.6–1.1)
EJECTION FRACTION: 60 %
FRACTIONAL SHORTENING: 34 % (ref 28–44)
INTERVENTRICULAR SEPTUM: 0.87 CM (ref 0.6–1.1)
IVRT: 57.09 MSEC
LA MAJOR: 7.41 CM
LA MINOR: 7.41 CM
LA WIDTH: 5.26 CM
LEFT ATRIUM SIZE: 4.62 CM
LEFT ATRIUM VOLUME INDEX MOD: 72 ML/M2
LEFT ATRIUM VOLUME INDEX: 73.6 ML/M2
LEFT ATRIUM VOLUME MOD: 149.79 CM3
LEFT ATRIUM VOLUME: 153.06 CM3
LEFT INTERNAL DIMENSION IN SYSTOLE: 3.04 CM (ref 2.1–4)
LEFT VENTRICLE DIASTOLIC VOLUME INDEX: 47.29 ML/M2
LEFT VENTRICLE DIASTOLIC VOLUME: 98.37 ML
LEFT VENTRICLE MASS INDEX: 59 G/M2
LEFT VENTRICLE SYSTOLIC VOLUME INDEX: 17.4 ML/M2
LEFT VENTRICLE SYSTOLIC VOLUME: 36.17 ML
LEFT VENTRICULAR INTERNAL DIMENSION IN DIASTOLE: 4.62 CM (ref 3.5–6)
LEFT VENTRICULAR MASS: 123.64 G
LV LATERAL E/E' RATIO: 7.09 M/S
LV SEPTAL E/E' RATIO: 7.09 M/S
MV PEAK E VEL: 0.78 M/S
PISA TR MAX VEL: 2.14 M/S
RA MAJOR: 7.4 CM
RA PRESSURE: 8 MMHG
RA WIDTH: 5.2 CM
RIGHT VENTRICULAR END-DIASTOLIC DIMENSION: 3.9 CM
RV TISSUE DOPPLER FREE WALL SYSTOLIC VELOCITY 1 (APICAL 4 CHAMBER VIEW): 12.39 CM/S
SINUS: 4 CM
STJ: 3.83 CM
TDI LATERAL: 0.11 M/S
TDI SEPTAL: 0.11 M/S
TDI: 0.11 M/S
TR MAX PG: 18 MMHG
TRICUSPID ANNULAR PLANE SYSTOLIC EXCURSION: 2.6 CM
TV REST PULMONARY ARTERY PRESSURE: 26 MMHG

## 2023-01-26 PROCEDURE — 71250 CT CHEST WITHOUT CONTRAST: ICD-10-PCS | Mod: 26,,, | Performed by: RADIOLOGY

## 2023-01-26 PROCEDURE — 99214 PR OFFICE/OUTPT VISIT, EST, LEVL IV, 30-39 MIN: ICD-10-PCS | Mod: S$PBB,,, | Performed by: INTERNAL MEDICINE

## 2023-01-26 PROCEDURE — 93306 TTE W/DOPPLER COMPLETE: CPT | Mod: 26,,, | Performed by: INTERNAL MEDICINE

## 2023-01-26 PROCEDURE — 99999 PR PBB SHADOW E&M-EST. PATIENT-LVL III: CPT | Mod: PBBFAC,,, | Performed by: THORACIC SURGERY (CARDIOTHORACIC VASCULAR SURGERY)

## 2023-01-26 PROCEDURE — 99214 OFFICE O/P EST MOD 30 MIN: CPT | Mod: PBBFAC,25 | Performed by: INTERNAL MEDICINE

## 2023-01-26 PROCEDURE — 93306 TTE W/DOPPLER COMPLETE: CPT

## 2023-01-26 PROCEDURE — 99215 OFFICE O/P EST HI 40 MIN: CPT | Mod: S$PBB,,, | Performed by: THORACIC SURGERY (CARDIOTHORACIC VASCULAR SURGERY)

## 2023-01-26 PROCEDURE — 99215 PR OFFICE/OUTPT VISIT, EST, LEVL V, 40-54 MIN: ICD-10-PCS | Mod: S$PBB,,, | Performed by: THORACIC SURGERY (CARDIOTHORACIC VASCULAR SURGERY)

## 2023-01-26 PROCEDURE — 99999 PR PBB SHADOW E&M-EST. PATIENT-LVL III: ICD-10-PCS | Mod: PBBFAC,,, | Performed by: THORACIC SURGERY (CARDIOTHORACIC VASCULAR SURGERY)

## 2023-01-26 PROCEDURE — 99214 OFFICE O/P EST MOD 30 MIN: CPT | Mod: S$PBB,,, | Performed by: INTERNAL MEDICINE

## 2023-01-26 PROCEDURE — 71250 CT THORAX DX C-: CPT | Mod: TC

## 2023-01-26 PROCEDURE — 99999 PR PBB SHADOW E&M-EST. PATIENT-LVL IV: CPT | Mod: PBBFAC,,, | Performed by: INTERNAL MEDICINE

## 2023-01-26 PROCEDURE — 99213 OFFICE O/P EST LOW 20 MIN: CPT | Mod: PBBFAC,25,27 | Performed by: THORACIC SURGERY (CARDIOTHORACIC VASCULAR SURGERY)

## 2023-01-26 PROCEDURE — 93306 ECHO (CUPID ONLY): ICD-10-PCS | Mod: 26,,, | Performed by: INTERNAL MEDICINE

## 2023-01-26 PROCEDURE — 99999 PR PBB SHADOW E&M-EST. PATIENT-LVL IV: ICD-10-PCS | Mod: PBBFAC,,, | Performed by: INTERNAL MEDICINE

## 2023-01-26 PROCEDURE — 71250 CT THORAX DX C-: CPT | Mod: 26,,, | Performed by: RADIOLOGY

## 2023-01-26 RX ORDER — ZOLPIDEM TARTRATE 10 MG/1
10 TABLET ORAL NIGHTLY PRN
Qty: 30 TABLET | Refills: 4 | Status: SHIPPED | OUTPATIENT
Start: 2023-01-26 | End: 2023-06-03

## 2023-01-26 NOTE — LETTER
January 26, 2023        Spencer Beth MD  [address]  [facility] [address]             [facility] 2nd Fl  [address]  [facility] [address]  Phone: [phone]   Patient: Navjot Fine   MR Number: [id number]   YOB: 1950   Date of Visit: 1/26/2023       Dear Dr. Beth:    Thank you for referring Navjot Fine to me for evaluation. Below are the relevant portions of my assessment and plan of care.            If you have questions, please do not hesitate to call me. I look forward to following Navjot along with you.    Sincerely,      Immanuel Tadeo MD           CC  No Recipients

## 2023-01-26 NOTE — PROGRESS NOTES
Subjective:      Patient ID: Navjot Fine is a 72 y.o. male.    Chief Complaint: No chief complaint on file.      HPI:  Navjot Fine is a 72 y.o. male who presents for follow up for TAA. Pt has atrial fibrillation (on Coumadin), aortic stenosis (bicuspid) and TAA. Pt has no complaints no new events.  Last seen in clinic October 2021, TAA ~4.4- 4.5cm. Patient just past a kidney stone last week, otherwise doing well. Denies CP or back pain. Denies tobacco use. Bp is controlled. He started to go to the gym.     Current Outpatient Medications on File Prior to Visit   Medication Sig Dispense Refill    atenoloL (TENORMIN) 50 MG tablet TAKE 1 TABLET(50 MG) BY MOUTH EVERY DAY 30 tablet 11    b complex vitamins capsule Take 1 capsule by mouth once daily.      celecoxib (CELEBREX) 200 MG capsule TAKE 1 CAPSULE(200 MG) BY MOUTH TWICE DAILY 60 capsule 3    diltiaZEM (CARDIZEM CD) 240 MG 24 hr capsule TAKE 1 CAPSULE(240 MG) BY MOUTH EVERY DAY 90 capsule 3    warfarin (COUMADIN) 2 MG tablet TAKE 1 TABLET BY MOUTH DAILY. EXCEPT TUESDAYS AND SATURDAYS OR AS DIRECTED BY COUMADIN CLINIC 90 tablet 2    warfarin (COUMADIN) 5 MG tablet 5mg PO every Tue, Thu, Sat and 7mg PO all other days -- OR AS DIRECTED BY COUMADIN CLINIC (patient uses 5mg and 2mg strength tabs) 30 tablet 5    zolpidem (AMBIEN) 10 mg Tab Take 1 tablet (10 mg total) by mouth nightly as needed (insomnia). 30 tablet 4    dorzolamide-timolol 2-0.5% (COSOPT) 22.3-6.8 mg/mL ophthalmic solution Place 1 drop into the left eye 2 (two) times daily.      fluconazole (DIFLUCAN) 150 MG Tab 150 mg once a week.       warfarin (COUMADIN) 2 MG tablet TAKE 1 TABLET BY MOUTH DAILY EXCEPT TUESDAYS AND SATURDAYS OR AS DIRECTED BY COUMADIN CLINIC 90 tablet 2    [DISCONTINUED] zolpidem (AMBIEN) 10 mg Tab Take 1 tablet (10 mg total) by mouth nightly as needed (insomnia). 30 tablet 2     No current facility-administered medications on file prior to visit.       Current medications  Reviewed    Review of Systems   Constitutional:  Negative for activity change, appetite change, fatigue and fever.   HENT:  Negative for nosebleeds.    Respiratory:  Negative for cough and shortness of breath.    Cardiovascular:  Negative for chest pain, palpitations and leg swelling.   Gastrointestinal:  Negative for abdominal distention, abdominal pain and nausea.   Genitourinary:  Negative for frequency.   Musculoskeletal:  Negative for arthralgias and myalgias.   Skin:  Negative for rash.   Neurological:  Negative for dizziness, seizures and numbness.   Hematological:  Does not bruise/bleed easily.   Objective:   Physical Exam  Vitals and nursing note reviewed.   Constitutional:       Appearance: Normal appearance.   HENT:      Head: Normocephalic and atraumatic.   Eyes:      Pupils: Pupils are equal, round, and reactive to light.   Cardiovascular:      Rate and Rhythm: Normal rate.   Pulmonary:      Effort: Pulmonary effort is normal.   Abdominal:      General: Abdomen is flat.      Palpations: Abdomen is soft.   Musculoskeletal:         General: Normal range of motion.      Cervical back: Normal range of motion.   Skin:     General: Skin is warm and dry.      Capillary Refill: Capillary refill takes less than 2 seconds.      Coloration: Skin is not pale.   Neurological:      General: No focal deficit present.      Mental Status: He is alert.       Diagnotic Results: reviewed  CT chest 1/26/2023 ~ 4.5 cm measured by Dr. Tadeo   1. Stable fusiform dilatation of the ascending aorta dating back to 07/20/2018 CTA given differences in measurement technique.  Measurements above.  2. Moderate to severe biatrial enlargement.  3. Moderate to severe aortic valve calcifications.  4. Stable pulmonary nodules measuring no larger than 4 mm.  Given long-term stability, favor benign.  5. Other findings as above.    ECHO 1/26/2023 pending     CT chest 10/2021 ~4.4-4.5cm measured by Dr. Tadeo    ECHO 10/2021  The left  ventricle is normal in size with normal systolic function.The estimated ejection fraction is 55%.  Mild right ventricular enlargement with normal right ventricular systolic function.  Severe biatrial enlargement.  The aortic valve is bileaflet ( type 1; fusion of left and right coronary cusps).  Aortic valve area is 1.07 cm2; peak velocity is 2.14 m/s; mean gradient is 12 mmHg.  There is low flow, low gradient moderate aortic valve stenosis.  Mild-to-moderate mitral regurgitation.  Mild tricuspid regurgitation.  The estimated PA systolic pressure is 25 mmHg.  Intermediate central venous pressure (8 mmHg).  The sinuses of Valsalva is moderately dilated.  The aortic root is moderately dilated.  The ascending aorta is moderately dilated ( 4.5cm).  Atrial fibrillation observed.  Assessment:   1. TAA with Bicuspid Aortic Valve  Plan:     CTS Attending Note:    I have personally taken the history and examined this patient and agree with the SUNNI's note as stated above.  Very pleasant 72-year-old gentleman whom I have been following since 2018 for bicuspid aortic valve and ascending aortic aneurysm.  His ascending aorta remains stable in size at roughly 4.4 cm in maximal diameter.  His bicuspid aortic valve is without significant stenosis.  The mean gradient is around 12 or 13 mmHg.  The preliminary reading of his echo reported what appeared to be significant mitral regurgitation.  However, he walks 10 miles a day and is asymptomatic.  I will plan to see him back in 1 year with a repeat echo and CT scan.

## 2023-01-26 NOTE — PROGRESS NOTES
Subjective:       Patient ID: Navjot Fine is a 72 y.o. male.    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 so comes in for annual follow-up.  He is seeing his heart specialist with an echo in CT today for follow-up bicuspid valve.  He has stable AFib.   He has a dermatologist.  He is considering seeing Urology for symptomatic BPH.  PSA was stable and we will do his exam  He tells me about a story from about a week and half ago where he had a severe right-sided abdominal pain that led to an ER visit.  Was finishing his do meeting and went to stand up and felt a severe mid right lateral abdominal pain.  He watched it for a while and it did not change.  He could get comfortable.  So he decided to go to the ER.  He could feel a cramping type pain then became very nauseated and believes he passed out in the car on the way to the ER.  His wife was driving.  When he came to and proceeded to go in he realized that the pain had alleviated and it has not reoccurred.  He went on with the evaluation.  He had a CT scan of the abdomen that was unremarkable, no evidence of stones.    Labs were unremarkable.  I did not see a urine in the lab work that he brought but he knows they collected 1 and he can not remember the details.  He said the emergency room doctor evaluated him and said they suspect he passed a kidney stone.  Of note is that a CT scan done here 13 months prior did show a right-sided 3 mm nonobstructing intrarenal lower pole stone that did not show up on the CT from a week ago so I think that does fit with the working theory of a kidney stone.    Review of Systems   Constitutional:  Negative for chills, fatigue and fever.   HENT:  Positive for hearing loss. Negative for nosebleeds and trouble swallowing.    Eyes:  Negative for pain and visual disturbance.   Respiratory:  Negative for cough, shortness of breath and wheezing.     Cardiovascular:  Negative for chest pain and palpitations.   Gastrointestinal:  Negative for abdominal pain, constipation, diarrhea, nausea and vomiting.   Genitourinary:  Positive for frequency (nocturia). Negative for difficulty urinating and hematuria.   Musculoskeletal:  Negative for arthralgias, back pain and neck pain.   Integumentary:  Negative for rash.   Neurological:  Negative for dizziness and headaches.   Hematological:  Does not bruise/bleed easily.   Psychiatric/Behavioral:  Negative for dysphoric mood and sleep disturbance.          Past Medical History:   Diagnosis Date    Atrial fibrillation     Cholelithiasis      Past Surgical History:   Procedure Laterality Date    ARTHROSCOPIC DEBRIDEMENT OF SHOULDER Right 12/18/2020    Procedure: DEBRIDEMENT, SHOULDER, ARTHROSCOPIC;  Surgeon: Elias Saeed MD;  Location: [address];  Service: Orthopedics;  Laterality: Right;  LABRAL      PARTIAL ARTHROPLASTY OF SHOULDER Right 12/18/2020    Procedure: HEMIARTHROPLASTY, SHOULDER;  Surgeon: Elias Saeed MD;  Location: [address];  Service: Orthopedics;  Laterality: Right;  Glenoid resurfacing    TOTAL HIP ARTHROPLASTY Left 2009    Copious blood loss    TOTAL HIP ARTHROPLASTY Right 2014    Intra-op transfusion prepared      Patient Active Problem List   Diagnosis    Long term (current) use of anticoagulants    Atrial fibrillation    Bicuspid aortic valve    Aortic root enlargement    Cholelithiasis    Right shoulder pain    Arthritis of shoulder    Primary osteoarthritis of right shoulder    Biceps tendinitis of right shoulder    Osteoarthritis of right shoulder    Aortic root dilation    S/P shoulder replacement, right    Primary osteoarthritis of left shoulder        Objective:      Physical Exam  Constitutional:       General: He is not in acute distress.     Appearance: He is well-developed.   HENT:      Head: Normocephalic and atraumatic.      Right Ear: Tympanic membrane, ear canal and  external ear normal.      Left Ear: Tympanic membrane, ear canal and external ear normal.      Nose: Nose normal. No rhinorrhea.      Mouth/Throat:      Pharynx: No oropharyngeal exudate or posterior oropharyngeal erythema.   Eyes:      General: No scleral icterus.     Conjunctiva/sclera: Conjunctivae normal.      Pupils: Pupils are equal, round, and reactive to light.   Neck:      Thyroid: No thyromegaly.      Vascular: No JVD.      Comments: No supraclavicular nodes palpated  Cardiovascular:      Rate and Rhythm: Normal rate. Rhythm irregular.      Pulses: Normal pulses.      Heart sounds: Normal heart sounds. No murmur heard.  Pulmonary:      Effort: Pulmonary effort is normal.      Breath sounds: Normal breath sounds. No wheezing or rales.   Abdominal:      General: Bowel sounds are normal. There is no distension.      Palpations: Abdomen is soft. There is no mass.      Tenderness: There is no abdominal tenderness.      Hernia: A hernia is present.   Genitourinary:     Prostate: Enlarged. Not tender and no nodules present.      Rectum: Normal. Guaiac result negative. No tenderness.   Musculoskeletal:         General: No tenderness. Normal range of motion.      Cervical back: Normal range of motion and neck supple.      Right lower leg: No edema.      Left lower leg: No edema.   Lymphadenopathy:      Cervical: No cervical adenopathy.      Upper Body:      Right upper body: No supraclavicular adenopathy.      Left upper body: No supraclavicular adenopathy.   Skin:     General: Skin is warm and dry.      Coloration: Skin is not jaundiced or pale.      Findings: No rash.   Neurological:      General: No focal deficit present.      Mental Status: He is alert and oriented to person, place, and time.      Cranial Nerves: No cranial nerve deficit.      Coordination: Coordination normal.      Deep Tendon Reflexes: Reflexes are normal and symmetric.      Reflex Scores:       Bicep reflexes are 2+ on the right side and 2+  "on the left side.       Patellar reflexes are 2+ on the right side and 2+ on the left side.  Psychiatric:         Mood and Affect: Mood normal. Mood is not depressed.         Behavior: Behavior normal.         Thought Content: Thought content normal.       Assessment:       Problem List Items Addressed This Visit          Cardiac/Vascular    Atrial fibrillation    Bicuspid aortic valve - Primary    Aortic root enlargement       Orthopedic    Primary osteoarthritis of right shoulder     Other Visit Diagnoses       Hyperlipidemia, unspecified hyperlipidemia type        History of COVID-19                Plan:         Navjot was seen today for annual exam.    Diagnoses and all orders for this visit:    Bicuspid aortic valve    Permanent atrial fibrillation  Comments:  Monitor through Cardiology, continue medication-clinically stable    Aortic root enlargement  Comments:  Continue to monitor through cardiology-clinically stable    Primary osteoarthritis of right shoulder    Hyperlipidemia, unspecified hyperlipidemia type    History of COVID-19    Other orders  -     zolpidem (AMBIEN) 10 mg Tab; Take 1 tablet (10 mg total) by mouth nightly as needed (insomnia).           Continue to see specialist.  He is considering Urology for did BPH with symptoms   Keep hydrated to prevent recurrent kidney stones  Encouraged to establish doctors in [place] since he is spending a good bit of his time there.          Portions of this note may have been created with voice recognition software. Occasional "wrong-word" or "sound-a-like" substitutions may have occurred due to the inherent limitations of voice recognition software. Please, read the note carefully and recognize, using context, where substitutions have occurred.  "

## 2023-01-27 ENCOUNTER — PATIENT MESSAGE (OUTPATIENT)
Dept: INTERNAL MEDICINE | Facility: CLINIC | Age: 73
End: 2023-01-27
Payer: MEDICARE

## 2023-05-15 ENCOUNTER — PATIENT MESSAGE (OUTPATIENT)
Dept: INTERNAL MEDICINE | Facility: CLINIC | Age: 73
End: 2023-05-15
Payer: MEDICARE

## 2023-05-15 RX ORDER — TAMSULOSIN HYDROCHLORIDE 0.4 MG/1
0.4 CAPSULE ORAL DAILY
Qty: 90 CAPSULE | Refills: 11 | Status: SHIPPED | OUTPATIENT
Start: 2023-05-15

## 2023-06-02 NOTE — TELEPHONE ENCOUNTER
No care due was identified.  [facility] Embedded Care Due Messages. Reference number: [id number].   6/02/2023 11:20:42 AM CDT

## 2023-06-03 ENCOUNTER — PATIENT MESSAGE (OUTPATIENT)
Dept: INTERNAL MEDICINE | Facility: CLINIC | Age: 73
End: 2023-06-03
Payer: MEDICARE

## 2023-06-03 ENCOUNTER — PATIENT MESSAGE (OUTPATIENT)
Dept: ELECTROPHYSIOLOGY | Facility: CLINIC | Age: 73
End: 2023-06-03
Payer: MEDICARE

## 2023-06-03 RX ORDER — ZOLPIDEM TARTRATE 10 MG/1
TABLET ORAL
Qty: 30 TABLET | Refills: 0 | Status: SHIPPED | OUTPATIENT
Start: 2023-06-03 | End: 2024-02-01 | Stop reason: SDUPTHER

## 2023-07-12 ENCOUNTER — PATIENT MESSAGE (OUTPATIENT)
Dept: INTERNAL MEDICINE | Facility: CLINIC | Age: 73
End: 2023-07-12
Payer: MEDICARE

## 2023-07-12 DIAGNOSIS — H91.93 BILATERAL HEARING LOSS, UNSPECIFIED HEARING LOSS TYPE: Primary | ICD-10-CM

## 2023-10-18 ENCOUNTER — PATIENT MESSAGE (OUTPATIENT)
Dept: INTERNAL MEDICINE | Facility: CLINIC | Age: 73
End: 2023-10-18
Payer: MEDICARE

## 2023-10-18 ENCOUNTER — PATIENT MESSAGE (OUTPATIENT)
Dept: CARDIOTHORACIC SURGERY | Facility: CLINIC | Age: 73
End: 2023-10-18
Payer: MEDICARE

## 2023-10-18 DIAGNOSIS — Q23.1 BICUSPID AORTIC VALVE: ICD-10-CM

## 2023-10-18 DIAGNOSIS — Z12.5 SCREENING FOR PROSTATE CANCER: Primary | ICD-10-CM

## 2023-10-18 DIAGNOSIS — R73.09 ELEVATED GLUCOSE LEVEL: ICD-10-CM

## 2023-10-18 DIAGNOSIS — I48.21 PERMANENT ATRIAL FIBRILLATION: ICD-10-CM

## 2023-10-18 DIAGNOSIS — I77.89 AORTIC ROOT ENLARGEMENT: Primary | ICD-10-CM

## 2023-10-18 DIAGNOSIS — D64.9 ANEMIA, UNSPECIFIED TYPE: ICD-10-CM

## 2023-10-18 DIAGNOSIS — E78.5 HYPERLIPIDEMIA, UNSPECIFIED HYPERLIPIDEMIA TYPE: ICD-10-CM

## 2023-10-30 ENCOUNTER — PATIENT MESSAGE (OUTPATIENT)
Dept: INTERNAL MEDICINE | Facility: CLINIC | Age: 73
End: 2023-10-30
Payer: MEDICARE

## 2024-01-04 NOTE — PROGRESS NOTES
Ambulatory Care Coordination Note    ACM attempted to reach patient for follow up call regarding post-op healing/questions, nutrition Q's?, med review, graduate from  if doing well. HIPAA compliant message left requesting a return phone call at patient convenience.     Judi Jones LPN- Care Coordinator  [facility]  [address], [place]  [address]  Phone: [phone]  [email or web address]       Care Everywhere:   Immunization: updated  Health Maintenance: updated  Media Review:   Legacy Review:   Order placed:   Upcoming appts

## 2024-01-25 LAB
ALBUMIN SERPL-MCNC: 4.4 G/DL (ref 3.8–4.8)
ALBUMIN/GLOB SERPL: 2.2 {RATIO} (ref 1.2–2.2)
ALP SERPL-CCNC: 70 IU/L (ref 44–121)
ALT SERPL-CCNC: 12 IU/L (ref 0–44)
AST SERPL-CCNC: 27 IU/L (ref 0–40)
BASOPHILS # BLD AUTO: 0 X10E3/UL (ref 0–0.2)
BASOPHILS NFR BLD AUTO: 1 %
BILIRUB SERPL-MCNC: 1.3 MG/DL (ref 0–1.2)
BUN SERPL-MCNC: 19 MG/DL (ref 8–27)
BUN/CREAT SERPL: 20 (ref 10–24)
CALCIUM SERPL-MCNC: 9.6 MG/DL (ref 8.6–10.2)
CHLORIDE SERPL-SCNC: 104 MMOL/L (ref 96–106)
CHOLEST SERPL-MCNC: 197 MG/DL (ref 100–199)
CO2 SERPL-SCNC: 23 MMOL/L (ref 20–29)
CREAT SERPL-MCNC: 0.96 MG/DL (ref 0.76–1.27)
EOSINOPHIL # BLD AUTO: 0.1 X10E3/UL (ref 0–0.4)
EOSINOPHIL NFR BLD AUTO: 2 %
ERYTHROCYTE [DISTWIDTH] IN BLOOD BY AUTOMATED COUNT: 10.9 % (ref 11.6–15.4)
EST. GFR  (NO RACE VARIABLE): 85 ML/MIN/1.73
GLOBULIN SER CALC-MCNC: 2 G/DL (ref 1.5–4.5)
GLUCOSE SERPL-MCNC: 90 MG/DL (ref 70–99)
HBA1C MFR BLD: 5 % (ref 4.8–5.6)
HCT VFR BLD AUTO: 39.1 % (ref 37.5–51)
HDLC SERPL-MCNC: 55 MG/DL
HGB BLD-MCNC: 13.1 G/DL (ref 13–17.7)
IMM GRANULOCYTES # BLD AUTO: 0 X10E3/UL (ref 0–0.1)
IMM GRANULOCYTES NFR BLD AUTO: 0 %
LDLC SERPL CALC-MCNC: 129 MG/DL (ref 0–99)
LYMPHOCYTES # BLD AUTO: 1.1 X10E3/UL (ref 0.7–3.1)
LYMPHOCYTES NFR BLD AUTO: 24 %
MCH RBC QN AUTO: 32 PG (ref 26.6–33)
MCHC RBC AUTO-ENTMCNC: 33.5 G/DL (ref 31.5–35.7)
MCV RBC AUTO: 96 FL (ref 79–97)
MONOCYTES # BLD AUTO: 0.4 X10E3/UL (ref 0.1–0.9)
MONOCYTES NFR BLD AUTO: 10 %
NEUTROPHILS # BLD AUTO: 2.8 X10E3/UL (ref 1.4–7)
NEUTROPHILS NFR BLD AUTO: 63 %
PLATELET # BLD AUTO: 217 X10E3/UL (ref 150–450)
POTASSIUM SERPL-SCNC: 4.9 MMOL/L (ref 3.5–5.2)
PROT SERPL-MCNC: 6.4 G/DL (ref 6–8.5)
RBC # BLD AUTO: 4.09 X10E6/UL (ref 4.14–5.8)
SODIUM SERPL-SCNC: 141 MMOL/L (ref 134–144)
TRIGL SERPL-MCNC: 71 MG/DL (ref 0–149)
TSH SERPL DL<=0.005 MIU/L-ACNC: 1.29 UIU/ML (ref 0.45–4.5)
VLDLC SERPL CALC-MCNC: 13 MG/DL (ref 5–40)
WBC # BLD AUTO: 4.4 X10E3/UL (ref 3.4–10.8)

## 2024-01-27 ENCOUNTER — PATIENT MESSAGE (OUTPATIENT)
Dept: INTERNAL MEDICINE | Facility: CLINIC | Age: 74
End: 2024-01-27
Payer: MEDICARE

## 2024-02-01 ENCOUNTER — OFFICE VISIT (OUTPATIENT)
Dept: CARDIOTHORACIC SURGERY | Facility: CLINIC | Age: 74
End: 2024-02-01
Payer: MEDICARE

## 2024-02-01 ENCOUNTER — HOSPITAL ENCOUNTER (OUTPATIENT)
Dept: CARDIOLOGY | Facility: HOSPITAL | Age: 74
Discharge: HOME | End: 2024-02-01
Attending: THORACIC SURGERY (CARDIOTHORACIC VASCULAR SURGERY)
Payer: MEDICARE

## 2024-02-01 ENCOUNTER — OFFICE VISIT (OUTPATIENT)
Dept: INTERNAL MEDICINE | Facility: CLINIC | Age: 74
End: 2024-02-01
Payer: MEDICARE

## 2024-02-01 ENCOUNTER — HOSPITAL ENCOUNTER (OUTPATIENT)
Dept: RADIOLOGY | Facility: HOSPITAL | Age: 74
Discharge: HOME | End: 2024-02-01
Attending: THORACIC SURGERY (CARDIOTHORACIC VASCULAR SURGERY)
Payer: MEDICARE

## 2024-02-01 VITALS
BODY MASS INDEX: 26.41 KG/M2 | HEART RATE: 60 BPM | DIASTOLIC BLOOD PRESSURE: 76 MMHG | SYSTOLIC BLOOD PRESSURE: 128 MMHG | HEIGHT: 72 IN | WEIGHT: 195 LBS | OXYGEN SATURATION: 98 %

## 2024-02-01 VITALS
DIASTOLIC BLOOD PRESSURE: 68 MMHG | OXYGEN SATURATION: 98 % | WEIGHT: 191.81 LBS | BODY MASS INDEX: 25.98 KG/M2 | HEIGHT: 72 IN | HEART RATE: 74 BPM | SYSTOLIC BLOOD PRESSURE: 116 MMHG

## 2024-02-01 VITALS
BODY MASS INDEX: 25.87 KG/M2 | SYSTOLIC BLOOD PRESSURE: 118 MMHG | DIASTOLIC BLOOD PRESSURE: 70 MMHG | WEIGHT: 191 LBS | HEART RATE: 62 BPM | HEIGHT: 72 IN

## 2024-02-01 DIAGNOSIS — I77.89 AORTIC ROOT ENLARGEMENT: ICD-10-CM

## 2024-02-01 DIAGNOSIS — Q23.1 BICUSPID AORTIC VALVE: ICD-10-CM

## 2024-02-01 DIAGNOSIS — G47.00 INSOMNIA, UNSPECIFIED TYPE: ICD-10-CM

## 2024-02-01 DIAGNOSIS — R49.8 WEAKNESS OF VOICE: ICD-10-CM

## 2024-02-01 DIAGNOSIS — B35.1 ONYCHOMYCOSIS: ICD-10-CM

## 2024-02-01 DIAGNOSIS — M19.011 PRIMARY OSTEOARTHRITIS OF RIGHT SHOULDER: ICD-10-CM

## 2024-02-01 DIAGNOSIS — I77.89 AORTIC ROOT ENLARGEMENT: Primary | ICD-10-CM

## 2024-02-01 DIAGNOSIS — I77.810 AORTIC ROOT DILATION: ICD-10-CM

## 2024-02-01 DIAGNOSIS — I48.21 PERMANENT ATRIAL FIBRILLATION: Primary | ICD-10-CM

## 2024-02-01 LAB
ASCENDING AORTA: 4.42 CM
AV INDEX (PROSTH): 0.24
AV MEAN GRADIENT: 16 MMHG
AV PEAK GRADIENT: 24 MMHG
AV VALVE AREA BY VELOCITY RATIO: 1.32 CM²
AV VALVE AREA: 1.34 CM²
AV VELOCITY RATIO: 0.24
BSA FOR ECHO PROCEDURE: 2.1 M2
CV ECHO LV RWT: 0.34 CM
DOP CALC AO PEAK VEL: 2.46 M/S
DOP CALC AO VTI: 56.88 CM
DOP CALC LVOT AREA: 5.6 CM2
DOP CALC LVOT DIAMETER: 2.67 CM
DOP CALC LVOT PEAK VEL: 0.58 M/S
DOP CALC LVOT STROKE VOLUME: 76.11 CM3
DOP CALCLVOT PEAK VEL VTI: 13.6 CM
E/E' RATIO: 6.59 M/S
ECHO LV POSTERIOR WALL: 0.75 CM (ref 0.6–1.1)
EJECTION FRACTION: 58 %
FRACTIONAL SHORTENING: 25 % (ref 28–44)
INTERVENTRICULAR SEPTUM: 0.71 CM (ref 0.6–1.1)
LA MAJOR: 6.54 CM
LA MINOR: 7.02 CM
LA WIDTH: 5.22 CM
LEFT ATRIUM SIZE: 4.91 CM
LEFT ATRIUM VOLUME INDEX MOD: 55.4 ML/M2
LEFT ATRIUM VOLUME INDEX: 70.6 ML/M2
LEFT ATRIUM VOLUME MOD: 115.85 CM3
LEFT ATRIUM VOLUME: 147.52 CM3
LEFT INTERNAL DIMENSION IN SYSTOLE: 3.28 CM (ref 2.1–4)
LEFT VENTRICLE DIASTOLIC VOLUME INDEX: 41.93 ML/M2
LEFT VENTRICLE DIASTOLIC VOLUME: 87.63 ML
LEFT VENTRICLE MASS INDEX: 46 G/M2
LEFT VENTRICLE SYSTOLIC VOLUME INDEX: 20.8 ML/M2
LEFT VENTRICLE SYSTOLIC VOLUME: 43.38 ML
LEFT VENTRICULAR INTERNAL DIMENSION IN DIASTOLE: 4.4 CM (ref 3.5–6)
LEFT VENTRICULAR MASS: 97.15 G
LV LATERAL E/E' RATIO: 6.85 M/S
LV SEPTAL E/E' RATIO: 6.36 M/S
MV PEAK E VEL: 0.89 M/S
PISA TR MAX VEL: 2.46 M/S
RA MAJOR: 6.43 CM
RA PRESSURE ESTIMATED: 15 MMHG
RA WIDTH: 4.89 CM
RIGHT VENTRICULAR END-DIASTOLIC DIMENSION: 4.52 CM
RV TB RVSP: 17 MMHG
SINUS: 4.17 CM
STJ: 3.57 CM
TDI LATERAL: 0.13 M/S
TDI SEPTAL: 0.14 M/S
TDI: 0.14 M/S
TR MAX PG: 24 MMHG
TRICUSPID ANNULAR PLANE SYSTOLIC EXCURSION: 1.68 CM
TV REST PULMONARY ARTERY PRESSURE: 39 MMHG
Z-SCORE OF LEFT VENTRICULAR DIMENSION IN END DIASTOLE: -3.82
Z-SCORE OF LEFT VENTRICULAR DIMENSION IN END SYSTOLE: -1.45

## 2024-02-01 PROCEDURE — 99214 OFFICE O/P EST MOD 30 MIN: CPT | Mod: S$PBB,,, | Performed by: INTERNAL MEDICINE

## 2024-02-01 PROCEDURE — 99999 PR PBB SHADOW E&M-EST. PATIENT-LVL III: CPT | Mod: PBBFAC,,, | Performed by: THORACIC SURGERY (CARDIOTHORACIC VASCULAR SURGERY)

## 2024-02-01 PROCEDURE — 93306 TTE W/DOPPLER COMPLETE: CPT | Mod: 26,,, | Performed by: INTERNAL MEDICINE

## 2024-02-01 PROCEDURE — 93306 TTE W/DOPPLER COMPLETE: CPT

## 2024-02-01 PROCEDURE — 99214 OFFICE O/P EST MOD 30 MIN: CPT | Mod: S$PBB,,, | Performed by: THORACIC SURGERY (CARDIOTHORACIC VASCULAR SURGERY)

## 2024-02-01 PROCEDURE — 99213 OFFICE O/P EST LOW 20 MIN: CPT | Mod: PBBFAC,25,27 | Performed by: THORACIC SURGERY (CARDIOTHORACIC VASCULAR SURGERY)

## 2024-02-01 PROCEDURE — 71250 CT THORAX DX C-: CPT | Mod: TC

## 2024-02-01 PROCEDURE — 99214 OFFICE O/P EST MOD 30 MIN: CPT | Mod: PBBFAC,25 | Performed by: INTERNAL MEDICINE

## 2024-02-01 PROCEDURE — 99999 PR PBB SHADOW E&M-EST. PATIENT-LVL IV: CPT | Mod: PBBFAC,,, | Performed by: INTERNAL MEDICINE

## 2024-02-01 PROCEDURE — 71250 CT THORAX DX C-: CPT | Mod: 26,,, | Performed by: RADIOLOGY

## 2024-02-01 RX ORDER — ZOLPIDEM TARTRATE 10 MG/1
TABLET ORAL
Qty: 30 TABLET | Refills: 2 | Status: SHIPPED | OUTPATIENT
Start: 2024-02-01

## 2024-02-01 NOTE — LETTER
February 1, 2024        Spencer Beth MD  [address]  [facility] [address]             [facility] - Cardiovasc Surg 2nd Fl  [address]  [facility] [address]  Phone: [phone]   Patient: Navjot Fine   MR Number: [id number]   YOB: 1950   Date of Visit: 2/1/2024       Dear Dr. Beth:    Thank you for referring Navjot Fine to me for evaluation. Below are the relevant portions of my assessment and plan of care.            If you have questions, please do not hesitate to call me. I look forward to following Navjot along with you.    Sincerely,      Immanuel Tadeo MD           CC  No Recipients

## 2024-02-01 NOTE — PROGRESS NOTES
Subjective:      Patient ID: Navjot Fine is a 73 y.o. male.    Chief Complaint: No chief complaint on file.      HPI:  Navjot Fine is a 73 y.o. male who presents to annual follow up for TAA with bicuspid Ao valve. Medical conditions include atrial fibrillation (on Coumadin), aortic stenosis (bicuspid) and TAA. Pt has no complaints no new events. Survallience CT shows stable TAA of 4.4-4.5 since 2018.  TAA ~4.4- 4.5cm. Last visit in 2023, Echo  showed bicuspid Ao valve without significant stenosis and significant mitral regurgitation.  However, he walked 10 miles a day and  was asymptomatic.       Today, he endorses dyspnea when speaking, feels like he gets more winded. was Denies CP or back pain. Denies tobacco use. Bp is controlled. He remains active. Echo significant for normal biventricular function. moderate bicuspid valve stenosis. Aortic valve area by VTI is 1.34 cm². Aortic valve peak velocity is 2.46 m/s. Mean gradient is 16 mmHg.  Mild- moderate mitral regurgitation. CVP 15. Surveillance CT, TAA 4.7 cm       Current medications Reviewed  Current Outpatient Medications on File Prior to Visit   Medication Sig Dispense Refill    atenoloL (TENORMIN) 50 MG tablet TAKE 1 TABLET(50 MG) BY MOUTH EVERY DAY 30 tablet 11    b complex vitamins capsule Take 1 capsule by mouth once daily.      celecoxib (CELEBREX) 200 MG capsule TAKE 1 CAPSULE(200 MG) BY MOUTH TWICE DAILY 60 capsule 3    diltiaZEM (CARDIZEM CD) 240 MG 24 hr capsule TAKE 1 CAPSULE(240 MG) BY MOUTH EVERY DAY 90 capsule 3    dorzolamide-timolol 2-0.5% (COSOPT) 22.3-6.8 mg/mL ophthalmic solution Place 1 drop into the left eye 2 (two) times daily.      fluconazole (DIFLUCAN) 150 MG Tab 150 mg once a week.       tamsulosin (FLOMAX) 0.4 mg Cap Take 1 capsule (0.4 mg total) by mouth once daily. 90 capsule 11    warfarin (COUMADIN) 2 MG tablet TAKE 1 TABLET BY MOUTH DAILY. EXCEPT TUESDAYS AND SATURDAYS OR AS DIRECTED BY COUMADIN CLINIC 90 tablet 2     warfarin (COUMADIN) 2 MG tablet TAKE 1 TABLET BY MOUTH DAILY EXCEPT TUESDAYS AND SATURDAYS OR AS DIRECTED BY COUMADIN CLINIC 90 tablet 2    warfarin (COUMADIN) 5 MG tablet 5mg PO every Tue, Thu, Sat and 7mg PO all other days -- OR AS DIRECTED BY COUMADIN CLINIC (patient uses 5mg and 2mg strength tabs) 30 tablet 5    zolpidem (AMBIEN) 10 mg Tab TAKE 1 TABLET(10 MG) BY MOUTH EVERY NIGHT AS NEEDED FOR INSOMNIA 30 tablet 0     No current facility-administered medications on file prior to visit.       Review of Systems   Constitutional:  Negative for activity change, appetite change, fatigue and fever.   HENT:  Negative for nosebleeds.    Respiratory:  Negative for cough and shortness of breath.    Cardiovascular:  Negative for chest pain, palpitations and leg swelling.   Gastrointestinal:  Negative for abdominal distention, abdominal pain and nausea.   Genitourinary:  Negative for frequency.   Musculoskeletal:  Negative for arthralgias and myalgias.   Skin:  Negative for rash.   Neurological:  Negative for dizziness and numbness.   Hematological:  Does not bruise/bleed easily.     Objective:   Physical Exam  Constitutional:       Appearance: Normal appearance.   HENT:      Head: Normocephalic and atraumatic.   Eyes:      Extraocular Movements: Extraocular movements intact.   Cardiovascular:      Rate and Rhythm: Normal rate.      Heart sounds: Murmur heard.   Pulmonary:      Effort: Pulmonary effort is normal.      Breath sounds: Normal breath sounds.   Abdominal:      General: Abdomen is flat.      Palpations: Abdomen is soft.   Musculoskeletal:         General: Normal range of motion.      Cervical back: Normal range of motion.   Skin:     General: Skin is warm and dry.      Capillary Refill: Capillary refill takes less than 2 seconds.      Coloration: Skin is not pale.   Neurological:      General: No focal deficit present.      Mental Status: He is alert.         Diagnotic Results:  Echo 2/1/24    Left Ventricle:  The left ventricle is normal in size. Normal wall thickness. Normal wall motion. There is normal systolic function with a visually estimated ejection fraction of 55 - 60%. Ejection fraction by visual approximation is 58%. There is normal diastolic function.    Right Ventricle: Normal right ventricular cavity size. Wall thickness is normal. Right ventricle wall motion  is normal. Systolic function is normal.    Left Atrium: Left atrium is severely dilated.    Right Atrium: Right atrium is moderately dilated.    Aortic Valve: There is severe aortic valve sclerosis. Severely restricted motion. There is moderate stenosis. Aortic valve area by VTI is 1.34 cm². Aortic valve peak velocity is 2.46 m/s. Mean gradient is 16 mmHg. The dimensionless index is 0.24.    Mitral Valve: There is severe bileaflet sclerosis. There is mild mitral annular calcification present. There is no stenosis. There is mild to mild- moderate regurgitation.    Tricuspid Valve: There is mild to moderate regurgitation.    Pulmonary Artery: The estimated pulmonary artery systolic pressure is 39 mmHg.    IVC/SVC: Elevated venous pressure at 15 mmHg.    CT chest 2/1/24- TAA 4.7 cm , measured by Dr. Tadeo.         CT chest 1/26/2023 ~ 4.5 cm measured by Dr. Tadeo   1. Stable fusiform dilatation of the ascending aorta dating back to 07/20/2018 CTA given differences in measurement technique.  Measurements above.  2. Moderate to severe biatrial enlargement.  3. Moderate to severe aortic valve calcifications.  4. Stable pulmonary nodules measuring no larger than 4 mm.  Given long-term stability, favor benign.  5. Other findings as above.     ECHO 1/26/2023 pending      CT chest 10/2021 ~4.4-4.5cm measured by Dr. Tadeo     ECHO 10/2021  The left ventricle is normal in size with normal systolic function.The estimated ejection fraction is 55%.  Mild right ventricular enlargement with normal right ventricular systolic function.  Severe biatrial  enlargement.  The aortic valve is bileaflet ( type 1; fusion of left and right coronary cusps).  Aortic valve area is 1.07 cm2; peak velocity is 2.14 m/s; mean gradient is 12 mmHg.  There is low flow, low gradient moderate aortic valve stenosis.  Mild-to-moderate mitral regurgitation.  Mild tricuspid regurgitation.  The estimated PA systolic pressure is 25 mmHg.  Intermediate central venous pressure (8 mmHg).  The sinuses of Valsalva is moderately dilated.  The aortic root is moderately dilated.  The ascending aorta is moderately dilated ( 4.5cm).  Atrial fibrillation observed.  Assessment:   TAA with bicuspid Ao valve   Plan:   CTS Attending Note:    I have personally taken the history and examined this patient and agree with the SUNNI's note as stated above.  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  His degree of aortic stenosis remains mild to moderate with a mean gradient of 16 mmHg.  The peak velocity is rough were described as ly 2.5 m/sec.  CT scan demonstrated the ascending aorta is roughly 4.7 cm at the level of the right pulmonary artery.  This represents a gradual increase in size over time.  He reports dyspnea with speaking, which is new for him.  His mitral valve had demonstrated severe mitral regurgitation on a previous echo but on today's echo it is reported as mild to moderate.  We will plan for a VALARIE to better evaluate both valves.  Both valves were described as sclerotic with restricted motion.  A VALARIE may better discern and assess for stenosis of the mitral.

## 2024-02-01 NOTE — PATIENT INSTRUCTIONS
Suggest seeing ENT to let them eval weakness or loss of breath while singing or doing extensive talking. May benefit from vocal cord eval.

## 2024-02-01 NOTE — PROGRESS NOTES
Subjective:       Patient ID: Navjot Fine is a 73 y.o. male.    Chief Complaint: Annual Exam    HPI:  Patient in for annual follow-up of medical problems.  Overall he seems to be doing well.  He had labs done that were stable.  PSA will be done today.  Is echo for follow-up bicuspid valve will be done today as well.  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.  He does go to doctors with [facility].  He wanted me to give thoughts on an issue he has been having for a few months where he feels like if he is talking for a long.  Are seeing he loses his breath, almost feels like he is short of breath but his voice gets weak.  He does not notice it at rest or if walking for exercise.  Saw Pulmonology with [facility] and was told lungs were ok and they did not think lungs related to his symptoms of weak voice or losing breath while speaking/singing.  I discussed potentially seeing an ENT for evaluation of vocal cord.  He has 1 that he saw for hearing aids and he will reach out.    He denies any tremor, trouble swallowing food, other weakness in the arms or legs.  No recent illness.  He is traveling a lot this year.  AFib has been rate controlled.  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     Review of Systems   Constitutional:  Negative for activity change and unexpected weight change.   HENT:  Positive for hearing loss and voice change. Negative for rhinorrhea and trouble swallowing.    Eyes:  Negative for discharge and visual disturbance.   Respiratory:  Positive for shortness of breath. Negative for chest tightness and wheezing.    Cardiovascular:  Negative for chest pain and palpitations.   Gastrointestinal:  Negative for blood in stool, constipation, diarrhea and vomiting.   Endocrine: Negative for polydipsia and polyuria.   Genitourinary:  Negative for difficulty urinating, hematuria  and urgency.   Musculoskeletal:  Negative for arthralgias, joint swelling and neck pain.   Neurological:  Negative for weakness and headaches.   Psychiatric/Behavioral:  Negative for confusion and dysphoric mood.            Past Medical History:   Diagnosis Date    Atrial fibrillation     Cholelithiasis      Past Surgical History:   Procedure Laterality Date    ARTHROSCOPIC DEBRIDEMENT OF SHOULDER Right 12/18/2020    Procedure: DEBRIDEMENT, SHOULDER, ARTHROSCOPIC;  Surgeon: Elias Saeed MD;  Location: [address];  Service: Orthopedics;  Laterality: Right;  LABRAL      PARTIAL ARTHROPLASTY OF SHOULDER Right 12/18/2020    Procedure: HEMIARTHROPLASTY, SHOULDER;  Surgeon: Elias Saeed MD;  Location: [facility] OR [address] [place] [address];  Service: Orthopedics;  Laterality: Right;  Glenoid resurfacing    TOTAL HIP ARTHROPLASTY Left 2009    Copious blood loss    TOTAL HIP ARTHROPLASTY Right 2014    Intra-op transfusion prepared      Patient Active Problem List   Diagnosis    Long term (current) use of anticoagulants    Atrial fibrillation    Bicuspid aortic valve    Aortic root enlargement    Cholelithiasis    Right shoulder pain    Arthritis of shoulder    Primary osteoarthritis of right shoulder    Biceps tendinitis of right shoulder    Osteoarthritis of right shoulder    Aortic root dilation    S/P shoulder replacement, right    Primary osteoarthritis of left shoulder        Objective:      Physical Exam  Constitutional:       General: He is not in acute distress.     Appearance: He is well-developed.   HENT:      Head: Normocephalic and atraumatic.      Right Ear: Tympanic membrane, ear canal and external ear normal.      Left Ear: Tympanic membrane, ear canal and external ear normal.      Mouth/Throat:      Pharynx: No oropharyngeal exudate or posterior oropharyngeal erythema.   Eyes:      General: No scleral icterus.     Conjunctiva/sclera: Conjunctivae normal.      Pupils: Pupils are equal, round, and reactive to light.    Neck:      Thyroid: No thyromegaly.      Comments: No supraclavicular nodes palpated  Cardiovascular:      Rate and Rhythm: Normal rate and regular rhythm.      Pulses: Normal pulses.      Heart sounds: Normal heart sounds. No murmur heard.  Pulmonary:      Effort: Pulmonary effort is normal.      Breath sounds: Normal breath sounds. No wheezing.   Abdominal:      General: Bowel sounds are normal.      Palpations: Abdomen is soft. There is no mass.      Tenderness: There is no abdominal tenderness.   Musculoskeletal:         General: No tenderness.      Cervical back: Normal range of motion and neck supple. No tenderness.      Right lower leg: No edema.      Left lower leg: No edema.   Lymphadenopathy:      Cervical: No cervical adenopathy.   Skin:     Coloration: Skin is not jaundiced or pale.   Neurological:      General: No focal deficit present.      Mental Status: He is alert and oriented to person, place, and time.   Psychiatric:         Mood and Affect: Mood normal.         Behavior: Behavior normal.         Assessment:       Problem List Items Addressed This Visit          Cardiac/Vascular    Atrial fibrillation - Primary    Bicuspid aortic valve    Aortic root dilation       Orthopedic    Primary osteoarthritis of right shoulder     Other Visit Diagnoses       Weakness of voice        Onychomycosis        Insomnia, unspecified type        Relevant Medications    zolpidem (AMBIEN) 10 mg Tab            Plan:         Navjot was seen today for annual exam.    Diagnoses and all orders for this visit:    Permanent atrial fibrillation  Comments:  Clinically stable.    Weakness of voice    Bicuspid aortic valve    Aortic root dilation    Primary osteoarthritis of right shoulder    Onychomycosis    Insomnia, unspecified type  -     zolpidem (AMBIEN) 10 mg Tab; TAKE 1 TABLET(10 MG) BY MOUTH EVERY NIGHT AS NEEDED FOR INSOMNIA           Update the PSA that could be not done because of insurance 365 day  "requirement.   reviewed, Ambien refilled for travel   Continue to see specialty doctors.  Patient will see ENT in [place].  He also has seen pulmonology and Podiatry.    Happy to keep seeing patient annually but understand if he permanently relocates to [place] that he will see doctors there      Portions of this note may have been created with voice recognition software. Occasional "wrong-word" or "sound-a-like" substitutions may have occurred due to the inherent limitations of voice recognition software. Please, read the note carefully and recognize, using context, where substitutions have occurred.  "

## 2024-02-04 ENCOUNTER — PATIENT MESSAGE (OUTPATIENT)
Dept: INTERNAL MEDICINE | Facility: CLINIC | Age: 74
End: 2024-02-04
Payer: MEDICARE

## 2024-02-04 DIAGNOSIS — R49.8 WEAKNESS OF VOICE: Primary | ICD-10-CM

## 2024-02-04 DIAGNOSIS — R49.0 HOARSENESS: ICD-10-CM

## 2024-02-05 NOTE — TELEPHONE ENCOUNTER
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  See his portal message.  He is trying to see ENT for a visit I believe maybe next week.  I am not sure if we can accommodate that with Trey Stovall but certainly see what we can do.

## 2024-02-07 ENCOUNTER — PATIENT MESSAGE (OUTPATIENT)
Dept: CARDIOTHORACIC SURGERY | Facility: CLINIC | Age: 74
End: 2024-02-07
Payer: MEDICARE

## 2024-02-07 DIAGNOSIS — Q23.1 BICUSPID AORTIC VALVE: ICD-10-CM

## 2024-02-07 DIAGNOSIS — I34.0 MITRAL VALVE INSUFFICIENCY, UNSPECIFIED ETIOLOGY: ICD-10-CM

## 2024-02-07 DIAGNOSIS — I35.0 AORTIC VALVE STENOSIS, ETIOLOGY OF CARDIAC VALVE DISEASE UNSPECIFIED: Primary | ICD-10-CM

## 2024-02-22 ENCOUNTER — PATIENT MESSAGE (OUTPATIENT)
Dept: CARDIOTHORACIC SURGERY | Facility: CLINIC | Age: 74
End: 2024-02-22
Payer: MEDICARE

## 2024-04-17 ENCOUNTER — PATIENT MESSAGE (OUTPATIENT)
Dept: INTERNAL MEDICINE | Facility: CLINIC | Age: 74
End: 2024-04-17
Payer: MEDICARE

## 2024-05-16 RX ORDER — TAMSULOSIN HYDROCHLORIDE 0.4 MG/1
0.4 CAPSULE ORAL
Qty: 90 CAPSULE | Refills: 2 | Status: SHIPPED | OUTPATIENT
Start: 2024-05-16 | End: 2024-06-06

## 2024-05-16 NOTE — TELEPHONE ENCOUNTER
Refill Decision Note   Navjot Fine  is requesting a refill authorization.  Brief Assessment and Rationale for Refill:  Approve     Medication Therapy Plan:         Comments:     Note composed:10:38 AM 05/16/2024

## 2024-05-16 NOTE — TELEPHONE ENCOUNTER
No care due was identified.  [facility] Embedded Care Due Messages. Reference number: [id number].   5/16/2024 3:01:39 AM CDT

## 2024-05-20 ENCOUNTER — PATIENT MESSAGE (OUTPATIENT)
Dept: INTERNAL MEDICINE | Facility: CLINIC | Age: 74
End: 2024-05-20
Payer: MEDICARE

## 2024-06-06 RX ORDER — TAMSULOSIN HYDROCHLORIDE 0.4 MG/1
0.4 CAPSULE ORAL
Qty: 90 CAPSULE | Refills: 2 | Status: SHIPPED | OUTPATIENT
Start: 2024-06-06

## 2024-06-06 NOTE — TELEPHONE ENCOUNTER
No care due was identified.  [facility] Embedded Care Due Messages. Reference number: [id number].   6/06/2024 4:11:57 PM CDT

## 2024-06-06 NOTE — TELEPHONE ENCOUNTER
Navjot Fine  is requesting a refill authorization.  Brief Assessment and Rationale for Refill:  Approve     Medication Therapy Plan:         Comments:     Note composed:4:39 PM 06/06/2024

## 2024-06-10 ENCOUNTER — PATIENT MESSAGE (OUTPATIENT)
Dept: INTERNAL MEDICINE | Facility: CLINIC | Age: 74
End: 2024-06-10
Payer: MEDICARE

## 2024-08-20 ENCOUNTER — PATIENT MESSAGE (OUTPATIENT)
Dept: INTERNAL MEDICINE | Facility: CLINIC | Age: 74
End: 2024-08-20
Payer: MEDICARE

## 2024-09-30 ENCOUNTER — PATIENT MESSAGE (OUTPATIENT)
Dept: CARDIOTHORACIC SURGERY | Facility: CLINIC | Age: 74
End: 2024-09-30
Payer: MEDICARE

## 2024-10-14 ENCOUNTER — PATIENT MESSAGE (OUTPATIENT)
Dept: CARDIOTHORACIC SURGERY | Facility: CLINIC | Age: 74
End: 2024-10-14
Payer: MEDICARE

## 2024-10-14 DIAGNOSIS — I35.0 AORTIC VALVE STENOSIS, ETIOLOGY OF CARDIAC VALVE DISEASE UNSPECIFIED: Primary | ICD-10-CM

## 2024-10-14 DIAGNOSIS — I77.89 AORTIC ROOT ENLARGEMENT: ICD-10-CM

## 2024-10-14 DIAGNOSIS — Q23.81 BICUSPID AORTIC VALVE: ICD-10-CM

## 2024-12-12 ENCOUNTER — PATIENT MESSAGE (OUTPATIENT)
Dept: CARDIOTHORACIC SURGERY | Facility: CLINIC | Age: 74
End: 2024-12-12
Payer: MEDICARE

## 2025-01-30 ENCOUNTER — PATIENT MESSAGE (OUTPATIENT)
Dept: INTERNAL MEDICINE | Facility: CLINIC | Age: 75
End: 2025-01-30
Payer: MEDICARE

## 2025-01-30 ENCOUNTER — PATIENT MESSAGE (OUTPATIENT)
Dept: CARDIOTHORACIC SURGERY | Facility: CLINIC | Age: 75
End: 2025-01-30
Payer: MEDICARE

## 2025-01-30 DIAGNOSIS — R73.09 ELEVATED GLUCOSE LEVEL: ICD-10-CM

## 2025-01-30 DIAGNOSIS — Z12.5 SCREENING FOR PROSTATE CANCER: Primary | ICD-10-CM

## 2025-01-30 DIAGNOSIS — E78.5 HYPERLIPIDEMIA, UNSPECIFIED HYPERLIPIDEMIA TYPE: ICD-10-CM

## 2025-01-30 DIAGNOSIS — D64.9 ANEMIA, UNSPECIFIED TYPE: ICD-10-CM

## 2025-02-05 ENCOUNTER — TELEPHONE (OUTPATIENT)
Dept: CARDIOTHORACIC SURGERY | Facility: CLINIC | Age: 75
End: 2025-02-05
Payer: MEDICARE

## 2025-02-05 NOTE — TELEPHONE ENCOUNTER
Spoke with pt and confirmed 2/6 saroj with Dr. Tadeo. Pt verbalized understanding of appt times and locations. Pt denies any questions at this time.

## 2025-02-06 ENCOUNTER — HOSPITAL ENCOUNTER (OUTPATIENT)
Dept: CARDIOLOGY | Facility: HOSPITAL | Age: 75
Discharge: HOME | End: 2025-02-06
Attending: THORACIC SURGERY (CARDIOTHORACIC VASCULAR SURGERY)
Payer: MEDICARE

## 2025-02-06 ENCOUNTER — HOSPITAL ENCOUNTER (OUTPATIENT)
Dept: RADIOLOGY | Facility: HOSPITAL | Age: 75
Discharge: HOME | End: 2025-02-06
Attending: THORACIC SURGERY (CARDIOTHORACIC VASCULAR SURGERY)
Payer: MEDICARE

## 2025-02-06 ENCOUNTER — OFFICE VISIT (OUTPATIENT)
Dept: CARDIOTHORACIC SURGERY | Facility: CLINIC | Age: 75
End: 2025-02-06
Payer: MEDICARE

## 2025-02-06 ENCOUNTER — OFFICE VISIT (OUTPATIENT)
Dept: INTERNAL MEDICINE | Facility: CLINIC | Age: 75
End: 2025-02-06
Payer: MEDICARE

## 2025-02-06 VITALS
HEIGHT: 72 IN | DIASTOLIC BLOOD PRESSURE: 70 MMHG | SYSTOLIC BLOOD PRESSURE: 110 MMHG | BODY MASS INDEX: 25.56 KG/M2 | OXYGEN SATURATION: 98 % | HEART RATE: 104 BPM | WEIGHT: 188.69 LBS

## 2025-02-06 VITALS
HEART RATE: 73 BPM | SYSTOLIC BLOOD PRESSURE: 125 MMHG | OXYGEN SATURATION: 97 % | DIASTOLIC BLOOD PRESSURE: 69 MMHG | BODY MASS INDEX: 25.67 KG/M2 | WEIGHT: 189.5 LBS | HEIGHT: 72 IN

## 2025-02-06 VITALS — BODY MASS INDEX: 25.68 KG/M2 | WEIGHT: 189.63 LBS | HEIGHT: 72 IN

## 2025-02-06 DIAGNOSIS — I35.0 AORTIC VALVE STENOSIS, ETIOLOGY OF CARDIAC VALVE DISEASE UNSPECIFIED: ICD-10-CM

## 2025-02-06 DIAGNOSIS — R73.09 ELEVATED GLUCOSE LEVEL: ICD-10-CM

## 2025-02-06 DIAGNOSIS — I77.89 AORTIC ROOT ENLARGEMENT: ICD-10-CM

## 2025-02-06 DIAGNOSIS — Q23.0 AORTIC STENOSIS DUE TO BICUSPID AORTIC VALVE: ICD-10-CM

## 2025-02-06 DIAGNOSIS — Q23.81 AORTIC STENOSIS DUE TO BICUSPID AORTIC VALVE: ICD-10-CM

## 2025-02-06 DIAGNOSIS — I77.810 AORTIC ROOT DILATION: ICD-10-CM

## 2025-02-06 DIAGNOSIS — Z12.5 SCREENING FOR PROSTATE CANCER: ICD-10-CM

## 2025-02-06 DIAGNOSIS — M19.011 PRIMARY OSTEOARTHRITIS OF RIGHT SHOULDER: ICD-10-CM

## 2025-02-06 DIAGNOSIS — M19.012 PRIMARY OSTEOARTHRITIS OF LEFT SHOULDER: ICD-10-CM

## 2025-02-06 DIAGNOSIS — Q23.81 BICUSPID AORTIC VALVE: ICD-10-CM

## 2025-02-06 DIAGNOSIS — G47.00 INSOMNIA, UNSPECIFIED TYPE: ICD-10-CM

## 2025-02-06 DIAGNOSIS — I48.21 PERMANENT ATRIAL FIBRILLATION: Primary | ICD-10-CM

## 2025-02-06 DIAGNOSIS — Q23.81 BICUSPID AORTIC VALVE: Primary | ICD-10-CM

## 2025-02-06 DIAGNOSIS — M25.562 ACUTE PAIN OF LEFT KNEE: ICD-10-CM

## 2025-02-06 DIAGNOSIS — K40.90 UNILATERAL INGUINAL HERNIA WITHOUT OBSTRUCTION OR GANGRENE, RECURRENCE NOT SPECIFIED: ICD-10-CM

## 2025-02-06 LAB
ASCENDING AORTA: 4.09 CM
AV AREA BY CONTINUOUS VTI: 0.6 CM2
AV INDEX (PROSTH): 0.13
AV LVOT MEAN GRADIENT: 1 MMHG
AV LVOT PEAK GRADIENT: 1 MMHG
AV MEAN GRADIENT: 35 MMHG
AV PEAK GRADIENT: 61 MMHG
AV VALVE AREA BY VELOCITY RATIO: 0.7 CM²
AV VALVE AREA: 0.6 CM²
AV VELOCITY RATIO: 0.15
BSA FOR ECHO PROCEDURE: 2.09 M2
CV ECHO LV RWT: 0.3 CM
DOP CALC AO PEAK VEL: 3.9 M/S
DOP CALC AO VTI: 90.6 CM
DOP CALC LVOT AREA: 4.5 CM2
DOP CALC LVOT DIAMETER: 2.4 CM
DOP CALC LVOT PEAK VEL: 0.6 M/S
DOP CALC LVOT STROKE VOLUME: 53.4 CM3
DOP CALCLVOT PEAK VEL VTI: 11.8 CM
E/E' RATIO: 9 M/S
ECHO EF ESTIMATED: 59 %
ECHO LV POSTERIOR WALL: 0.7 CM (ref 0.6–1.1)
EJECTION FRACTION: 63 %
FRACTIONAL SHORTENING: 30.4 % (ref 28–44)
INTERVENTRICULAR SEPTUM: 1.1 CM (ref 0.6–1.1)
IVC DIAMETER: 2.85 CM
LA MAJOR: 7.6 CM
LA MINOR: 7.6 CM
LA WIDTH: 5.6 CM
LEFT ATRIUM SIZE: 5 CM
LEFT ATRIUM VOLUME INDEX MOD: 72 ML/M2
LEFT ATRIUM VOLUME INDEX: 87 ML/M2
LEFT ATRIUM VOLUME MOD: 149 ML
LEFT ATRIUM VOLUME: 181 CM3
LEFT INTERNAL DIMENSION IN SYSTOLE: 3.2 CM (ref 2.1–4)
LEFT VENTRICLE DIASTOLIC VOLUME INDEX: 46.38 ML/M2
LEFT VENTRICLE DIASTOLIC VOLUME: 96.47 ML
LEFT VENTRICLE MASS INDEX: 66.2 G/M2
LEFT VENTRICLE SYSTOLIC VOLUME INDEX: 19.2 ML/M2
LEFT VENTRICLE SYSTOLIC VOLUME: 39.85 ML
LEFT VENTRICULAR INTERNAL DIMENSION IN DIASTOLE: 4.6 CM (ref 3.5–6)
LEFT VENTRICULAR MASS: 137.7 G
LV LATERAL E/E' RATIO: 9.5 M/S
LV SEPTAL E/E' RATIO: 9.5 M/S
MV PEAK E VEL: 1.04 M/S
OHS CV RV/LV RATIO: 1.04 CM
PISA TR MAX VEL: 2.8 M/S
RA MAJOR: 7.08 CM
RA PRESSURE ESTIMATED: 15 MMHG
RA WIDTH: 5.05 CM
RIGHT ATRIAL AREA: 29.9 CM2
RIGHT VENTRICLE DIASTOLIC BASEL DIMENSION: 4.8 CM
RV TB RVSP: 18 MMHG
RV TISSUE DOPPLER FREE WALL SYSTOLIC VELOCITY 1 (APICAL 4 CHAMBER VIEW): 14.87 CM/S
SINUS: 4.15 CM
STJ: 3.71 CM
TDI LATERAL: 0.11 M/S
TDI SEPTAL: 0.11 M/S
TDI: 0.11 M/S
TR MAX PG: 31 MMHG
TRICUSPID ANNULAR PLANE SYSTOLIC EXCURSION: 2.14 CM
TV PEAK GRADIENT: 32 MMHG
TV REST PULMONARY ARTERY PRESSURE: 46 MMHG
Z-SCORE OF LEFT VENTRICULAR DIMENSION IN END DIASTOLE: -3.25
Z-SCORE OF LEFT VENTRICULAR DIMENSION IN END SYSTOLE: -1.55

## 2025-02-06 PROCEDURE — 99213 OFFICE O/P EST LOW 20 MIN: CPT | Mod: PBBFAC,25 | Performed by: INTERNAL MEDICINE

## 2025-02-06 PROCEDURE — 99999 PR PBB SHADOW E&M-EST. PATIENT-LVL III: CPT | Mod: PBBFAC,,, | Performed by: INTERNAL MEDICINE

## 2025-02-06 PROCEDURE — 71250 CT THORAX DX C-: CPT | Mod: TC

## 2025-02-06 PROCEDURE — 99214 OFFICE O/P EST MOD 30 MIN: CPT | Mod: S$PBB,,, | Performed by: THORACIC SURGERY (CARDIOTHORACIC VASCULAR SURGERY)

## 2025-02-06 PROCEDURE — 71250 CT THORAX DX C-: CPT | Mod: 26,,, | Performed by: RADIOLOGY

## 2025-02-06 PROCEDURE — 99213 OFFICE O/P EST LOW 20 MIN: CPT | Mod: PBBFAC,25,27 | Performed by: THORACIC SURGERY (CARDIOTHORACIC VASCULAR SURGERY)

## 2025-02-06 PROCEDURE — 93306 TTE W/DOPPLER COMPLETE: CPT | Mod: 26,,, | Performed by: INTERNAL MEDICINE

## 2025-02-06 PROCEDURE — 93306 TTE W/DOPPLER COMPLETE: CPT

## 2025-02-06 PROCEDURE — 99214 OFFICE O/P EST MOD 30 MIN: CPT | Mod: S$PBB,,, | Performed by: INTERNAL MEDICINE

## 2025-02-06 PROCEDURE — 99999 PR PBB SHADOW E&M-EST. PATIENT-LVL III: CPT | Mod: PBBFAC,,, | Performed by: THORACIC SURGERY (CARDIOTHORACIC VASCULAR SURGERY)

## 2025-02-06 PROCEDURE — G2211 COMPLEX E/M VISIT ADD ON: HCPCS | Mod: S$PBB,,, | Performed by: INTERNAL MEDICINE

## 2025-02-06 RX ORDER — TAMSULOSIN HYDROCHLORIDE 0.4 MG/1
0.4 CAPSULE ORAL DAILY
Qty: 90 CAPSULE | Refills: 2 | Status: SHIPPED | OUTPATIENT
Start: 2025-02-06

## 2025-02-06 RX ORDER — ZOLPIDEM TARTRATE 10 MG/1
TABLET ORAL
Qty: 30 TABLET | Refills: 2 | Status: SHIPPED | OUTPATIENT
Start: 2025-02-06

## 2025-02-06 NOTE — PROGRESS NOTES
Subjective:      Patient ID: Navjot Fine is a 74 y.o. male.    Chief Complaint: No chief complaint on file.      HPI:  Navjot Fine is a 74 y.o. male who presents for follow up TAA with bicuspid aortic valve. Medical conditions include atrial fibrillation (on Coumadin).  This is his 7th visit with Dr. Tadeo. Survallience CT shows stable TAA of 4.4-4.5 since 2018.    At last visit in February 2023, he had some interval increase in size of TAA and endorsed dyspnea when speaking. There was one echo that showed severe MR and one that noted mild to moderate. Recommendation was for VALARIE to better delineate what the valve function truly is.      Patient states that his SOB has lessened since his last visit. 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 He denies CP but states he gets an expanding feeling in his heart while bending over to  a  but not while picking up the same weights in the gym. He denies syncope, dizziness, or seizures. He states that taking diltiazem and atenolol is the only way he can do thins since he is in constant A-fib.     Family and social history reviewed    Current Outpatient Medications   Medication Instructions    atenoloL (TENORMIN) 50 MG tablet TAKE 1 TABLET(50 MG) BY MOUTH EVERY DAY    diltiaZEM (CARDIZEM CD) 240 MG 24 hr capsule TAKE 1 CAPSULE(240 MG) BY MOUTH EVERY DAY    dorzolamide-timolol 2-0.5% (COSOPT) 22.3-6.8 mg/mL ophthalmic solution 1 drop, 2 times daily    tamsulosin (FLOMAX) 0.4 mg, Oral, Daily    warfarin (COUMADIN) 2 MG tablet TAKE 1 TABLET BY MOUTH DAILY. EXCEPT TUESDAYS AND SATURDAYS OR AS DIRECTED BY COUMADIN CLINIC    warfarin (COUMADIN) 5 MG tablet 5mg PO every Tue, Thu, Sat and 7mg PO all other days -- OR AS DIRECTED BY COUMADIN CLINIC (patient uses 5mg and 2mg strength tabs)    zolpidem (AMBIEN) 10 mg Tab TAKE 1 TABLET(10 MG) BY MOUTH EVERY NIGHT AS NEEDED FOR INSOMNIA         Review of patient's  allergies indicates:  No Known Allergies  Past Medical History:   Diagnosis Date    Atrial fibrillation     Cholelithiasis      Past Surgical History:   Procedure Laterality Date    ARTHROSCOPIC DEBRIDEMENT OF SHOULDER Right 12/18/2020    Procedure: DEBRIDEMENT, SHOULDER, ARTHROSCOPIC;  Surgeon: Elias Saeed MD;  Location: [address];  Service: Orthopedics;  Laterality: Right;  LABRAL      PARTIAL ARTHROPLASTY OF SHOULDER Right 12/18/2020    Procedure: HEMIARTHROPLASTY, SHOULDER;  Surgeon: Elias Saeed MD;  Location: [address];  Service: Orthopedics;  Laterality: Right;  Glenoid resurfacing    TOTAL HIP ARTHROPLASTY Left 2009    Copious blood loss    TOTAL HIP ARTHROPLASTY Right 2014    Intra-op transfusion prepared     Family History    None       Social History     Socioeconomic History    Marital status:    Tobacco Use    Smoking status: Never    Smokeless tobacco: Never   Substance and Sexual Activity    Alcohol use: Never     Comment: [facility].    Drug use: Never     Social Drivers of Health     Financial Resource Strain: Low Risk  (1/30/2025)    Overall Financial Resource Strain (CARDIA)     Difficulty of Paying Living Expenses: Not hard at all   Food Insecurity: No Food Insecurity (1/30/2025)    Hunger Vital Sign     Worried About Running Out of Food in the Last Year: Never true     Ran Out of Food in the Last Year: Never true   Transportation Needs: No Transportation Needs (12/18/2024)    [facility] from [facility] - [facility]     In the past 12 months, has lack of transportation kept you from medical appointments or from getting medications?: No     In the past 12 months, has lack of transportation kept you from meetings, work, or from getting things needed for daily living?: No   Physical Activity: Sufficiently Active (1/30/2025)    Exercise Vital Sign     Days of Exercise per Week: 7 days     Minutes of Exercise per Session: 70 min   Stress: No  Stress Concern Present (1/30/2025)    [employer] - Occupational Stress Questionnaire     Feeling of Stress : Only a little   Housing Stability: Unknown (1/30/2025)    Housing Stability Vital Sign     Unable to Pay for Housing in the Last Year: No       Current medications Reviewed    Review of Systems   Constitutional:  Negative for activity change.   Respiratory:  Positive for shortness of breath (while speaking, improved).    Cardiovascular:  Negative for chest pain.   Musculoskeletal:  Negative for gait problem.   Skin:  Negative for color change.   Neurological:  Negative for dizziness.   Hematological:  Does not bruise/bleed easily.   Psychiatric/Behavioral:  Negative for sleep disturbance.      Objective:   Physical Exam  Vitals reviewed.   Constitutional:       General: He is not in acute distress.     Appearance: He is well-developed. He is not diaphoretic.   HENT:      Head: Normocephalic and atraumatic.   Eyes:      Conjunctiva/sclera: Conjunctivae normal.   Neck:      Vascular: No JVD.   Cardiovascular:      Rate and Rhythm: Normal rate.   Pulmonary:      Effort: Pulmonary effort is normal. No respiratory distress.   Musculoskeletal:         General: Normal range of motion.      Cervical back: Normal range of motion.   Skin:     Coloration: Skin is not pale.   Neurological:      General: No focal deficit present.      Mental Status: He is alert.   Psychiatric:         Speech: Speech normal.         Behavior: Behavior normal.         Thought Content: Thought content normal.         Judgment: Judgment normal.         Diagnostic Results:   OSH TTE 12/2024  1. Left Ventricle: Normal left ventricular cavity size. Mild     concentric hypertrophy of the left ventricle. The left ventricular     endocardium is not well visualized which precludes accurate     assessment of regional wall motion and global systolic function.     EF estimated at 50-55%. Low-normal left ventricular  systolic     function. Diastolic function grade indeterminate due to rhythm     disturbance.     2. Right Ventricle: Right ventricle is mildly dilated.  Normal right     ventricular global systolic function.     3. Left Atrium: Severely dilated left atrium.     4. Right Atrium: Severely dilated right atrium.     5. Interatrial Septum: No patent foramen ovale present by color     Doppler.     6. Mitral Valve: Structurally normal mitral valve. No mitral stenosis.     Mild mitral regurgitation.     7. Aortic Valve: Diffuse thickening (sclerosis) of the aortic valve     cusps with reduced leaflet excursion. Possible bicuspid valve,     fused right and left cusp. Calculated stroke volume index is     reduced at 24 ml/m2. Peak aortic gradient is 37 mmHg, mean aortic     gradient is 25 mmHg, aortic valve area by continuity equation is     0.8 cm2. Severe aortic stenosis. No aortic regurgitation noted.     8. Tricuspid Valve: Normally structured tricuspid valve. No     tricuspid stenosis. Moderate tricuspid regurgitation.  RVSP (PA     systolic pressure from tricuspid regurgitation jet) is estimated at 37     mmHg. Right atrial pressure estimate of 15 mmHg. Top-normal     pulmonary artery systolic pressure. IVC enlargement without     inspiratory collapse suggests elevated RA pressure.     9. Pulmonic Valve: Structurally normal pulmonic valve. No     pulmonic stenosis. Trace pulmonary regurgitation.     10. Masses and Vegetations: No masses or vegetations noted.     11. Aorta: Aortic root is top-normal in size.  Ascending aorta is     dilated, 45 mm.     12. Pericardium: No pericardial effusion.      CT 2/1/24  Left-sided aortic arch. Stable fusiform dilation of the ascending aorta measuring 4.7 cm. Moderate atherosclerosis of the thoracic aorta .   Assessment:   TAA  Bicuspid aortic valve with AS   Plan:     CTS Attending Note:    I have personally taken the history and examined this patient and agree with the SUNNI's note  as stated above.  74-year-old male with known bicuspid aortic valve.  I have followed him for some time.  He has been followed both for aneurysmal dilatation and progression of his aortic stenosis.  I reviewed his most recent studies.  He now has severe aortic stenosis, although he maintains that he remains asymptomatic.  Furthermore, his ascending aorta is now 4.6 cm in maximal diameter at the level of the right pulmonary artery based on my measurement from today's scan.  He now has bicuspid aortic valve with severe stenosis, and the indications for surgery include ascending aortic diameter greater than 4.5 cm.  I recommended aortic valve and ascending aortic replacement (B[place]all) as well as Maze procedure in light of his persistent atrial fibrillation.  We will plan for preoperative diagnostic coronary angiography.  He splits his time between [place] and [place].  It may be easier for him to obtain angiography in [place].  He will let us know if he would like to do that or have us make arrangements to have it done here.

## 2025-02-06 NOTE — PROGRESS NOTES
"HPI  History of Present Illness    CHIEF COMPLAINT:  Navjot presents for an annual follow up and to discuss various health concerns, including a hernia, shortness of breath while speaking, a knee injury, and potential cardiac issues.    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  Navjot reports several health concerns. A previously asymptomatic hernia has recently become painful, and he plans to have it surgically addressed in the spring. He had shortness of breath while speaking, which he consulted an ENT about. The ENT performed a nasal exam and suggested age-related vocal cord changes as a potential cause. This symptom has since improved, with no impact on his teaching or  duties.    9-10 months ago, the patient injured his left knee while [place] an airport, with sudden, severe pain and difficulty moving. An orthopedic specialist in [place] diagnosed a "100% meniscus tear" without imaging and advised conservative management unless symptoms became unbearable. The knee has since improved to approximately 95% of normal function.    Navjot is under the care of a cardiologist in [place] for his aortic valve. There has been uncertainty regarding the severity of his aortic stenosis. Initially diagnosed with mild or moderate stenosis, a colleague of his cardiologist later suggested it might be severe. Navjot awaits clarification on this diagnosis. He reports no change in symptoms and maintains his ability to walk 20,000 steps a day.  He is having an echo and chest CT Cardiothoracic surgery follow-up this afternoon.    Navjot also reports mild ankle swelling, which he suspects might be related to his Diltiazem medication. He manages his urinary frequency by limiting fluid intake when bathroom access is limited during travel.    Navjot denies feeling any different despite the potential change in " his aortic stenosis diagnosis. He denies fatigue, decreased stamina, shortness of breath, or lightheadedness upon standing.    MEDICATIONS:  Navjot is on Diltiazem and Flomax (generic) for prostate/urinary symptoms. He also uses Zolpidem (Ambien) for sleep, particularly during travel.   reviewed    MEDICAL HISTORY:  Navjot has a history of mild to moderate aortic valve stenosis, enlarged prostate, and hernia.      ROS:  General: -fatigue  Respiratory: +shortness of breath  Genitourinary: +frequency  Musculoskeletal: +joint pain, +muscle pain  Psychiatric: +sleep difficulty             Review of Systems   Constitutional:  Negative for activity change and unexpected weight change.   HENT:  Positive for voice change. Negative for hearing loss, rhinorrhea and trouble swallowing.    Eyes:  Negative for discharge and visual disturbance.   Respiratory:  Negative for chest tightness and wheezing.    Cardiovascular:  Negative for chest pain and palpitations.   Gastrointestinal:  Negative for blood in stool, constipation, diarrhea and vomiting.   Endocrine: Negative for polydipsia and polyuria.   Genitourinary:  Negative for difficulty urinating, hematuria and urgency.   Musculoskeletal:  Negative for arthralgias, joint swelling and neck pain.   Neurological:  Negative for weakness and headaches.   Psychiatric/Behavioral:  Negative for confusion and dysphoric mood.        Past Medical History:   Diagnosis Date    Atrial fibrillation     Cholelithiasis      Past Surgical History:   Procedure Laterality Date    ARTHROSCOPIC DEBRIDEMENT OF SHOULDER Right 12/18/2020    Procedure: DEBRIDEMENT, SHOULDER, ARTHROSCOPIC;  Surgeon: Elias Saeed MD;  Location: [address];  Service: Orthopedics;  Laterality: Right;  LABRAL      PARTIAL ARTHROPLASTY OF SHOULDER Right 12/18/2020    Procedure: HEMIARTHROPLASTY, SHOULDER;  Surgeon: Elias Saeed MD;  Location: [facility] OR [address] [place] [address];  Service: Orthopedics;  Laterality: Right;   Glenoid resurfacing    TOTAL HIP ARTHROPLASTY Left 2009    Copious blood loss    TOTAL HIP ARTHROPLASTY Right 2014    Intra-op transfusion prepared      Patient Active Problem List   Diagnosis    Long term (current) use of anticoagulants    Atrial fibrillation    Bicuspid aortic valve    Aortic root enlargement    Cholelithiasis    Right shoulder pain    Arthritis of shoulder    Primary osteoarthritis of right shoulder    Biceps tendinitis of right shoulder    Osteoarthritis of right shoulder    Aortic root dilation    S/P shoulder replacement, right    Primary osteoarthritis of left shoulder          Objective:      Physical Exam    Ears: No cerumen in ears.  Cardiovascular: Presence of heart murmur.  Male Genitourinary: Prostate enlarged without nodules or lumps.       Physical Exam  Constitutional:       General: He is not in acute distress.     Appearance: He is well-developed.   HENT:      Head: Normocephalic and atraumatic.      Right Ear: Tympanic membrane, ear canal and external ear normal.      Left Ear: Tympanic membrane, ear canal and external ear normal.      Mouth/Throat:      Pharynx: No oropharyngeal exudate or posterior oropharyngeal erythema.   Eyes:      General: No scleral icterus.     Conjunctiva/sclera: Conjunctivae normal.      Pupils: Pupils are equal, round, and reactive to light.   Neck:      Thyroid: No thyromegaly.      Comments: No supraclavicular nodes palpated  Cardiovascular:      Rate and Rhythm: Normal rate and regular rhythm.      Pulses: Normal pulses.      Heart sounds: Murmur heard.   Pulmonary:      Effort: Pulmonary effort is normal.      Breath sounds: Normal breath sounds. No wheezing.   Abdominal:      General: Bowel sounds are normal.      Palpations: Abdomen is soft. There is no mass.      Tenderness: There is no abdominal tenderness.   Genitourinary:     Prostate: Enlarged. No nodules present.   Musculoskeletal:         General: No tenderness.      Cervical back: Normal  range of motion and neck supple.      Right lower leg: Edema present.      Left lower leg: Edema (2+ bilaterally) present.   Lymphadenopathy:      Cervical: No cervical adenopathy.   Skin:     Coloration: Skin is not jaundiced or pale.   Neurological:      General: No focal deficit present.      Mental Status: He is alert and oriented to person, place, and time.   Psychiatric:         Mood and Affect: Mood normal.         Behavior: Behavior normal.           Assessment:       Problem List Items Addressed This Visit          Cardiac/Vascular    Atrial fibrillation - Primary    Bicuspid aortic valve    Aortic root dilation       Orthopedic    Primary osteoarthritis of right shoulder    Primary osteoarthritis of left shoulder     Other Visit Diagnoses       Insomnia, unspecified type        Relevant Medications    zolpidem (AMBIEN) 10 mg Tab    Unilateral inguinal hernia without obstruction or gangrene, recurrence not specified        Screening for prostate cancer        Elevated glucose level        Acute pain of left knee        Ortho felt meniscus tear. Treating conservatively    Aortic valve stenosis, etiology of cardiac valve disease unspecified                Plan:       Navjot was seen today for annual exam.    Diagnoses and all orders for this visit:    Permanent atrial fibrillation  Comments:  Continue Warfarin and Diltiazem    Insomnia, unspecified type  -     zolpidem (AMBIEN) 10 mg Tab; TAKE 1 TABLET(10 MG) BY MOUTH EVERY NIGHT AS NEEDED FOR INSOMNIA    Bicuspid aortic valve    Primary osteoarthritis of right shoulder    Aortic root dilation    Primary osteoarthritis of left shoulder    Unilateral inguinal hernia without obstruction or gangrene, recurrence not specified    Screening for prostate cancer    Elevated glucose level    Acute pain of left knee  Comments:  Ortho felt meniscus tear. Treating conservatively    Aortic valve stenosis, etiology of cardiac valve disease unspecified    Other orders  -    "  tamsulosin (FLOMAX) 0.4 mg Cap; Take 1 capsule (0.4 mg total) by mouth once daily.             As this patient's PCP, I am actively managing and/or treating their chronic medical conditions including Aortic stenosis, BPH and have been for at least 1 year. This includes, but is not limited to, medication management, coordination of care, documentation review from their specialists and labs/imaging review where pertinent.      Portions of this note may have been created with [employer] voice recognition software. Occasional "wrong-word" or "sound-a-like" substitutions may have occurred due to the inherent limitations of voice recognition software. Please, read the note carefully and recognize, using context, where substitutions have occurred.  "

## 2025-02-09 ENCOUNTER — PATIENT MESSAGE (OUTPATIENT)
Dept: INTERNAL MEDICINE | Facility: CLINIC | Age: 75
End: 2025-02-09
Payer: MEDICARE

## 2025-04-02 ENCOUNTER — TELEPHONE (OUTPATIENT)
Dept: CARDIOTHORACIC SURGERY | Facility: CLINIC | Age: 75
End: 2025-04-02
Payer: MEDICARE

## 2025-04-02 NOTE — TELEPHONE ENCOUNTER
Returned call to Tahmina and apologized for the delay but I had not received any requests for images. Provided Tahmina with direct fax number for CTS. Will send request to IT to transfer images via [employer].       ----- Message from Sissy sent at 4/2/2025 11:37 AM CDT -----  Regarding: NOREEN CHANEY [[id number]]  Type : Patient Call  Who Called ; Tahmina with [facility]   Does the patient know what this is regarding?:Tahmina with [facility] called and stated that she has faxed multiple requests for images to be sent over but has not received anything back yet and would like to receive a call back regarding his as soon as possible, thanks!!  Would the patient rather a call back or a response via [facility]?call back     Best Call Back Number:[phone] Tahmina with [facility]   Additional Information:

## 2025-05-06 ENCOUNTER — PATIENT MESSAGE (OUTPATIENT)
Dept: INTERNAL MEDICINE | Facility: CLINIC | Age: 75
End: 2025-05-06
Payer: MEDICARE

## (undated) DEVICE — SUT VICRYL+ 1 CT1 18IN

## (undated) DEVICE — Device

## (undated) DEVICE — SEE MEDLINE ITEM 146292

## (undated) DEVICE — CLOSURE SKIN STERI STRIP 1/2X4

## (undated) DEVICE — PAD SHOULDER CARE POLAR

## (undated) DEVICE — REAMER PILOTED HEADED 8.5MM

## (undated) DEVICE — PUMP COLD THERAPY

## (undated) DEVICE — GAUZE SPONGE 4X4 12PLY

## (undated) DEVICE — SUT MCRYL PLUS 4-0 PS2 27IN

## (undated) DEVICE — SEE MEDLINE ITEM 157160

## (undated) DEVICE — HOOD T-5 TEAR AWAY STERILE

## (undated) DEVICE — SLING SHOT II LARGE

## (undated) DEVICE — DRESSING ADAPTIC N ADH 3X8IN

## (undated) DEVICE — SEE MEDLINE ITEM 157131

## (undated) DEVICE — STAPLER SKIN ROTATING HEAD

## (undated) DEVICE — APPLICATOR CHLORAPREP ORN 26ML

## (undated) DEVICE — NDL ARTHSCP MF SCORPION

## (undated) DEVICE — BOWL CEMENT

## (undated) DEVICE — PACK SET UP CONVERTORS

## (undated) DEVICE — CONTAINER SPECIMEN STRL 4OZ

## (undated) DEVICE — GOWN SMART IMP BREATHABLE XXLG

## (undated) DEVICE — SET CEMENT (SCULP)

## (undated) DEVICE — SOL IRR NACL .9% 3000ML

## (undated) DEVICE — KIT EVACUATOR 3-SPRING 1/8 DRN

## (undated) DEVICE — KIT IRR SUCTION HND PIECE

## (undated) DEVICE — SUT PDS VIL/BLU DUAL ORTHO

## (undated) DEVICE — DRESSING AQUACEL AG ADV 3.5X12

## (undated) DEVICE — SUT FIBERWIRE FIBER 2M

## (undated) DEVICE — ADHESIVE DERMABOND ADVANCED

## (undated) DEVICE — DRAPE PLASTIC U 60X72

## (undated) DEVICE — ELECTRODE REM PLYHSV RETURN 9

## (undated) DEVICE — BLADE SHAVER 4.5 6/BX

## (undated) DEVICE — BLADE SAGITTAL 18 X 1.27 X 90M

## (undated) DEVICE — NDL MAYO 2

## (undated) DEVICE — ELECTRODE 90 DEGREE ANGLE

## (undated) DEVICE — BIT DRILL 2.4MM GRYPHON

## (undated) DEVICE — DRAPE STERI INSTRUMENT 1018

## (undated) DEVICE — SUT VICRYL PLUS 2-0 CT1 18

## (undated) DEVICE — PAD ABD 8X10 STERILE

## (undated) DEVICE — TAPE MEDIPORE 4IN X 2YDS

## (undated) DEVICE — KIT TOTAL KNEE TKOFG

## (undated) DEVICE — DRESSING N ADH OIL EMUL 3X3